# Patient Record
Sex: MALE | Race: BLACK OR AFRICAN AMERICAN | Employment: FULL TIME | URBAN - METROPOLITAN AREA
[De-identification: names, ages, dates, MRNs, and addresses within clinical notes are randomized per-mention and may not be internally consistent; named-entity substitution may affect disease eponyms.]

---

## 2020-02-04 ENCOUNTER — APPOINTMENT (OUTPATIENT)
Dept: CT IMAGING | Age: 50
End: 2020-02-04
Payer: COMMERCIAL

## 2020-02-04 ENCOUNTER — HOSPITAL ENCOUNTER (EMERGENCY)
Age: 50
Discharge: LEFT BEFORE DISCHARGE | End: 2020-02-04
Attending: EMERGENCY MEDICINE
Payer: COMMERCIAL

## 2020-02-04 VITALS
TEMPERATURE: 98.1 F | HEIGHT: 67 IN | RESPIRATION RATE: 16 BRPM | SYSTOLIC BLOOD PRESSURE: 190 MMHG | DIASTOLIC BLOOD PRESSURE: 100 MMHG | WEIGHT: 165 LBS | HEART RATE: 64 BPM | OXYGEN SATURATION: 100 % | BODY MASS INDEX: 25.9 KG/M2

## 2020-02-04 LAB
ALBUMIN SERPL-MCNC: 4.2 G/DL (ref 3.5–5.2)
ALP BLD-CCNC: 70 U/L (ref 40–129)
ALT SERPL-CCNC: 10 U/L (ref 0–40)
ANION GAP SERPL CALCULATED.3IONS-SCNC: 9 MMOL/L (ref 7–16)
AST SERPL-CCNC: 21 U/L (ref 0–39)
BASOPHILS ABSOLUTE: 0.13 E9/L (ref 0–0.2)
BASOPHILS RELATIVE PERCENT: 1.6 % (ref 0–2)
BILIRUB SERPL-MCNC: 0.3 MG/DL (ref 0–1.2)
BUN BLDV-MCNC: 14 MG/DL (ref 6–20)
CALCIUM SERPL-MCNC: 8.9 MG/DL (ref 8.6–10.2)
CHLORIDE BLD-SCNC: 106 MMOL/L (ref 98–107)
CO2: 25 MMOL/L (ref 22–29)
CREAT SERPL-MCNC: 0.8 MG/DL (ref 0.7–1.2)
EOSINOPHILS ABSOLUTE: 0.81 E9/L (ref 0.05–0.5)
EOSINOPHILS RELATIVE PERCENT: 10.1 % (ref 0–6)
GFR AFRICAN AMERICAN: >60
GFR NON-AFRICAN AMERICAN: >60 ML/MIN/1.73
GLUCOSE BLD-MCNC: 98 MG/DL (ref 74–99)
HCT VFR BLD CALC: 44.1 % (ref 37–54)
HEMOGLOBIN: 14.3 G/DL (ref 12.5–16.5)
IMMATURE GRANULOCYTES #: 0.01 E9/L
IMMATURE GRANULOCYTES %: 0.1 % (ref 0–5)
LACTIC ACID: 1.2 MMOL/L (ref 0.5–2.2)
LYMPHOCYTES ABSOLUTE: 2.12 E9/L (ref 1.5–4)
LYMPHOCYTES RELATIVE PERCENT: 26.5 % (ref 20–42)
MCH RBC QN AUTO: 28 PG (ref 26–35)
MCHC RBC AUTO-ENTMCNC: 32.4 % (ref 32–34.5)
MCV RBC AUTO: 86.5 FL (ref 80–99.9)
MONOCYTES ABSOLUTE: 0.64 E9/L (ref 0.1–0.95)
MONOCYTES RELATIVE PERCENT: 8 % (ref 2–12)
NEUTROPHILS ABSOLUTE: 4.28 E9/L (ref 1.8–7.3)
NEUTROPHILS RELATIVE PERCENT: 53.7 % (ref 43–80)
PDW BLD-RTO: 15 FL (ref 11.5–15)
PLATELET # BLD: 185 E9/L (ref 130–450)
PMV BLD AUTO: 11.8 FL (ref 7–12)
POTASSIUM SERPL-SCNC: 5 MMOL/L (ref 3.5–5)
RBC # BLD: 5.1 E12/L (ref 3.8–5.8)
SODIUM BLD-SCNC: 140 MMOL/L (ref 132–146)
TOTAL PROTEIN: 7 G/DL (ref 6.4–8.3)
WBC # BLD: 8 E9/L (ref 4.5–11.5)

## 2020-02-04 PROCEDURE — 85025 COMPLETE CBC W/AUTO DIFF WBC: CPT

## 2020-02-04 PROCEDURE — 70491 CT SOFT TISSUE NECK W/DYE: CPT

## 2020-02-04 PROCEDURE — 70450 CT HEAD/BRAIN W/O DYE: CPT

## 2020-02-04 PROCEDURE — 99284 EMERGENCY DEPT VISIT MOD MDM: CPT

## 2020-02-04 PROCEDURE — 6370000000 HC RX 637 (ALT 250 FOR IP): Performed by: EMERGENCY MEDICINE

## 2020-02-04 PROCEDURE — 83605 ASSAY OF LACTIC ACID: CPT

## 2020-02-04 PROCEDURE — 2580000003 HC RX 258: Performed by: EMERGENCY MEDICINE

## 2020-02-04 PROCEDURE — 6360000002 HC RX W HCPCS: Performed by: EMERGENCY MEDICINE

## 2020-02-04 PROCEDURE — 6360000004 HC RX CONTRAST MEDICATION: Performed by: RADIOLOGY

## 2020-02-04 PROCEDURE — 80053 COMPREHEN METABOLIC PANEL: CPT

## 2020-02-04 PROCEDURE — 96374 THER/PROPH/DIAG INJ IV PUSH: CPT

## 2020-02-04 PROCEDURE — 36415 COLL VENOUS BLD VENIPUNCTURE: CPT

## 2020-02-04 RX ORDER — HYDRALAZINE HYDROCHLORIDE 20 MG/ML
10 INJECTION INTRAMUSCULAR; INTRAVENOUS ONCE
Status: COMPLETED | OUTPATIENT
Start: 2020-02-04 | End: 2020-02-04

## 2020-02-04 RX ORDER — IBUPROFEN 800 MG/1
800 TABLET ORAL EVERY 8 HOURS PRN
Qty: 12 TABLET | Refills: 0 | Status: SHIPPED | OUTPATIENT
Start: 2020-02-04

## 2020-02-04 RX ORDER — AMLODIPINE BESYLATE 5 MG/1
10 TABLET ORAL DAILY
Qty: 30 TABLET | Refills: 3 | Status: SHIPPED | OUTPATIENT
Start: 2020-02-04

## 2020-02-04 RX ORDER — AMOXICILLIN AND CLAVULANATE POTASSIUM 875; 125 MG/1; MG/1
1 TABLET, FILM COATED ORAL 2 TIMES DAILY
Qty: 20 TABLET | Refills: 0 | Status: SHIPPED | OUTPATIENT
Start: 2020-02-04 | End: 2020-02-14

## 2020-02-04 RX ORDER — IBUPROFEN 800 MG/1
800 TABLET ORAL ONCE
Status: COMPLETED | OUTPATIENT
Start: 2020-02-04 | End: 2020-02-04

## 2020-02-04 RX ORDER — AMLODIPINE BESYLATE 5 MG/1
10 TABLET ORAL DAILY
Status: DISCONTINUED | OUTPATIENT
Start: 2020-02-04 | End: 2020-02-04 | Stop reason: HOSPADM

## 2020-02-04 RX ORDER — 0.9 % SODIUM CHLORIDE 0.9 %
1000 INTRAVENOUS SOLUTION INTRAVENOUS ONCE
Status: COMPLETED | OUTPATIENT
Start: 2020-02-04 | End: 2020-02-04

## 2020-02-04 RX ADMIN — SODIUM CHLORIDE 1000 ML: 9 INJECTION, SOLUTION INTRAVENOUS at 11:25

## 2020-02-04 RX ADMIN — HYDRALAZINE HYDROCHLORIDE 10 MG: 20 INJECTION INTRAMUSCULAR; INTRAVENOUS at 13:23

## 2020-02-04 RX ADMIN — IOPAMIDOL 90 ML: 755 INJECTION, SOLUTION INTRAVENOUS at 12:12

## 2020-02-04 RX ADMIN — IBUPROFEN 800 MG: 800 TABLET, FILM COATED ORAL at 14:07

## 2020-02-04 RX ADMIN — AMLODIPINE BESYLATE 10 MG: 5 TABLET ORAL at 14:28

## 2020-02-04 ASSESSMENT — PAIN SCALES - GENERAL
PAINLEVEL_OUTOF10: 6
PAINLEVEL_OUTOF10: 6
PAINLEVEL_OUTOF10: 10

## 2020-02-04 ASSESSMENT — PAIN DESCRIPTION - ORIENTATION: ORIENTATION: LEFT

## 2020-02-04 ASSESSMENT — PAIN DESCRIPTION - DESCRIPTORS: DESCRIPTORS: STABBING;THROBBING

## 2020-02-04 ASSESSMENT — PAIN DESCRIPTION - PAIN TYPE: TYPE: ACUTE PAIN

## 2020-02-04 ASSESSMENT — PAIN DESCRIPTION - LOCATION: LOCATION: THROAT

## 2020-02-04 NOTE — ED PROVIDER NOTES
Cardiovascular:  Normal rate, normal rhythm, no murmurs, no gallops, no rubs. Radial pulses 2+ bilaterally. GI:  Soft, nondistended, normal bowel sounds, nontender, no organomegaly, no mass, no rebound, no guarding   :  No costovertebral angle tenderness   Musculoskeletal:  No edema, no tenderness, no deformities. Back- no tenderness  Integument:  Well hydrated, no rash. Adequate perfusion. Lymphatic: 2 discrete mobile posterior lymph nodes noted 1 behind right ear 1 right occipital lymphadenopathy noted. The occipital node is tender the posterior auricular node is nontender. Neurologic:  Alert & oriented x 3, CN 2-12 normal, normal gait, no focal deficits noted. Psychiatric:  Speech and behavior appropriate       -------------------------------------------------- RESULTS -------------------------------------------------  I have personally reviewed all laboratory and imaging results for this patient. Results are listed below.      LABS:  Results for orders placed or performed during the hospital encounter of 02/04/20   CBC auto differential   Result Value Ref Range    WBC 8.0 4.5 - 11.5 E9/L    RBC 5.10 3.80 - 5.80 E12/L    Hemoglobin 14.3 12.5 - 16.5 g/dL    Hematocrit 44.1 37.0 - 54.0 %    MCV 86.5 80.0 - 99.9 fL    MCH 28.0 26.0 - 35.0 pg    MCHC 32.4 32.0 - 34.5 %    RDW 15.0 11.5 - 15.0 fL    Platelets 429 142 - 072 E9/L    MPV 11.8 7.0 - 12.0 fL    Neutrophils % 53.7 43.0 - 80.0 %    Immature Granulocytes % 0.1 0.0 - 5.0 %    Lymphocytes % 26.5 20.0 - 42.0 %    Monocytes % 8.0 2.0 - 12.0 %    Eosinophils % 10.1 (H) 0.0 - 6.0 %    Basophils % 1.6 0.0 - 2.0 %    Neutrophils Absolute 4.28 1.80 - 7.30 E9/L    Immature Granulocytes # 0.01 E9/L    Lymphocytes Absolute 2.12 1.50 - 4.00 E9/L    Monocytes Absolute 0.64 0.10 - 0.95 E9/L    Eosinophils Absolute 0.81 (H) 0.05 - 0.50 E9/L    Basophils Absolute 0.13 0.00 - 0.20 E9/L   Comprehensive Metabolic Panel   Result Value Ref Range    Sodium 140 132 - chloride bolus (0 mLs Intravenous Stopped 2/4/20 1258)   iopamidol (ISOVUE-370) 76 % injection 90 mL (90 mLs Intravenous Given 2/4/20 1212)   hydrALAZINE (APRESOLINE) injection 10 mg (10 mg Intravenous Given 2/4/20 1323)   ibuprofen (ADVIL;MOTRIN) tablet 800 mg (800 mg Oral Given 2/4/20 1407)           Procedures:  none      Medical Decision Making:    BP up, used to take Norvasc 10mg but hasn't been on for 2 years. HR low (in 60s) but no improvement with Hydralazine. Given Norvasc here and Rx and he doesn't want to wait for number to come down, states \"I feel fine\". Will f/u with PCP. Rx for Augmentin and Motrin for adenopathy of cervical area and thickening of orophanx and upper esophagus. Need ENT referral by PCP. This patient's ED course included: a personal history and physicial examination, IV medications, cardiac monitoring, continuous pulse oximetry and a personal history and physicial eaxmination    This patient has remained hemodynamically stable, remained unchanged and been closely monitored during their ED course. Re-Evaluations:             Time: 2:34 PM  Re-evaluation. Patients symptoms show no change  Repeat physical examination is not changed        Consultations:             none    Critical Care: none        Counseling: The emergency provider has spoken with the patient and spouse/SO and discussed todays results, in addition to providing specific details for the plan of care and counseling regarding the diagnosis and prognosis. Questions are answered at this time and they are agreeable with the plan. This patient has chosen to leave against medical advice. I have personally explained to them that choosing to do so may result in permanent bodily harm or death. I discussed at length that without further evaluation and monitoring there may be unforeseen circumstances and deterioration resulting in permanent bodily harm or death as a result of their choice.     They are alert, oriented, and competent at this time. They state that they are aware of the serious risks as explained, but they continue to wish to leave against medical advice. In light of their decision to leave against medical advice, follow-up has been arranged and they are aware of the importance of following up as instructed. They have been advised that they should return to the ED immediately if they change their mind at any time, or if their condition begins to change or worsen. --------------------------------- IMPRESSION AND DISPOSITION ---------------------------------    IMPRESSION  1. Cervical lymphadenopathy    2.  Essential hypertension        DISPOSITION  Disposition: Other Disposition: Left AMA  Patient condition is stable                 Hodan Sharp DO  02/04/20 1283

## 2023-08-14 ENCOUNTER — TELEPHONE (OUTPATIENT)
Dept: ADMINISTRATIVE | Age: 53
End: 2023-08-14

## 2023-08-14 NOTE — TELEPHONE ENCOUNTER
Ene Bear with [facility] office calling for URGENT NP apt with Cardiology. Patient Appointment Form:      PCP: Dr. Antwon Brown  Referring: Dr. Antwon Brown    Has the Patient:    Seen a Cardiologist? no    Had a heart catheterization? no    Had heart surgery? no    Had a stress test or nuclear stress test? Chest CT - faxing from [facility]    Had an echocardiogram? no    Had a vascular ultrasound? no    Had a 24/48 heart monitor or extended cardiac event monitor? no    Had recent blood work in the last 6 months?  Yes PCP faxing    Had a pacemaker/ICD/ILR implant? no    Seen an Electrophysiologist? no        Will send records via: PCP faxing referral and recs and CT chest to [facility]       Date & time of appointment:  8/15/23 @ 11:00 with \"Dr. Mcnulty\" - ok'd by office

## 2023-08-15 ENCOUNTER — OFFICE VISIT (OUTPATIENT)
Dept: CARDIOLOGY CLINIC | Age: 53
End: 2023-08-15
Payer: COMMERCIAL

## 2023-08-15 VITALS
RESPIRATION RATE: 18 BRPM | WEIGHT: 160.1 LBS | BODY MASS INDEX: 25.13 KG/M2 | SYSTOLIC BLOOD PRESSURE: 192 MMHG | DIASTOLIC BLOOD PRESSURE: 94 MMHG | HEART RATE: 62 BPM | HEIGHT: 67 IN

## 2023-08-15 DIAGNOSIS — M16.0 PRIMARY OSTEOARTHRITIS OF BOTH HIPS: ICD-10-CM

## 2023-08-15 DIAGNOSIS — Z72.0 TOBACCO ABUSE: ICD-10-CM

## 2023-08-15 DIAGNOSIS — R06.09 DOE (DYSPNEA ON EXERTION): Primary | ICD-10-CM

## 2023-08-15 DIAGNOSIS — M15.9 OSTEOARTHRITIS OF MULTIPLE JOINTS, UNSPECIFIED OSTEOARTHRITIS TYPE: ICD-10-CM

## 2023-08-15 DIAGNOSIS — R01.1 HEART MURMUR: ICD-10-CM

## 2023-08-15 DIAGNOSIS — I10 ESSENTIAL HYPERTENSION: ICD-10-CM

## 2023-08-15 PROCEDURE — 93000 ELECTROCARDIOGRAM COMPLETE: CPT | Performed by: INTERNAL MEDICINE

## 2023-08-15 PROCEDURE — 3077F SYST BP >= 140 MM HG: CPT | Performed by: INTERNAL MEDICINE

## 2023-08-15 PROCEDURE — 3080F DIAST BP >= 90 MM HG: CPT | Performed by: INTERNAL MEDICINE

## 2023-08-15 PROCEDURE — 99204 OFFICE O/P NEW MOD 45 MIN: CPT | Performed by: INTERNAL MEDICINE

## 2023-08-15 NOTE — PROGRESS NOTES
Out PATIENT New CARDIOLOGY CONSULT    Name: Candida Rodriguez    Age: 47 y.o. Date of Admission: No admission date for patient encounter. Date of Service: 8/15/2023    Reason for Consultation:   Chief Complaint   Patient presents with    New Patient     Enlarged heart, left atrial enlargement          Referring Physician: No admitting provider for patient encounter. History of Present Illness: 65-year-old male with history of hypertension, degenerative joint disease and tobacco abuse who referred by his primary care physician due to heart murmur. The patient reports generally he is very active working as a  lifting heavy objects and walking long distance without any limitation. He reported occasional/once in a blooming blue shortness of breath but he denies chest pain, fatigue, dizziness, palpitations, orthopnea or paroxysmal nocturnal dyspnea lower extremity edema. On physical examination he does have systolic heart murmur and echocardiogram was arranged. He will monitor for his symptoms closely and if you develop worsening dyspnea on exertion or if you develop fatigue or chest discomfort then he will call our office to arrange for possible ask medical evaluation with stress test.        Past Medical History:  No past medical history on file. Past Surgical History:  No past surgical history on file. Family History:  No family history on file.     Social History:  Social History     Socioeconomic History    Marital status:      Spouse name: Not on file    Number of children: Not on file    Years of education: Not on file    Highest education level: Not on file   Occupational History    Not on file   Tobacco Use    Smoking status: Every Day     Types: Cigars    Smokeless tobacco: Current     Types: Chew    Tobacco comments:     4-5 cigars/day   Vaping Use    Vaping Use: Never used   Substance and Sexual Activity    Alcohol use: Yes     Comment: occasionally

## 2023-09-28 ENCOUNTER — HOSPITAL ENCOUNTER (OUTPATIENT)
Dept: CARDIOLOGY | Age: 53
Discharge: HOME | End: 2023-09-28
Payer: COMMERCIAL

## 2023-09-28 DIAGNOSIS — M15.9 OSTEOARTHRITIS OF MULTIPLE JOINTS, UNSPECIFIED OSTEOARTHRITIS TYPE: ICD-10-CM

## 2023-09-28 DIAGNOSIS — I10 ESSENTIAL HYPERTENSION: ICD-10-CM

## 2023-09-28 DIAGNOSIS — R01.1 HEART MURMUR: ICD-10-CM

## 2023-09-28 DIAGNOSIS — Z72.0 TOBACCO ABUSE: ICD-10-CM

## 2023-09-28 DIAGNOSIS — R06.09 DOE (DYSPNEA ON EXERTION): ICD-10-CM

## 2023-09-28 DIAGNOSIS — M16.0 PRIMARY OSTEOARTHRITIS OF BOTH HIPS: ICD-10-CM

## 2023-09-28 PROCEDURE — 93306 TTE W/DOPPLER COMPLETE: CPT | Performed by: PSYCHIATRY & NEUROLOGY

## 2023-10-24 ENCOUNTER — TELEPHONE (OUTPATIENT)
Dept: CARDIOLOGY CLINIC | Age: 53
End: 2023-10-24

## 2023-10-26 NOTE — TELEPHONE ENCOUNTER
Patient notified of Dr. Sammye Kocher results and recommendations. Follow up scheduled for 10/31/23.  Patient verbalized understanding

## 2023-10-31 ENCOUNTER — OFFICE VISIT (OUTPATIENT)
Dept: CARDIOLOGY CLINIC | Age: 53
End: 2023-10-31
Payer: COMMERCIAL

## 2023-10-31 VITALS
HEART RATE: 64 BPM | RESPIRATION RATE: 12 BRPM | SYSTOLIC BLOOD PRESSURE: 190 MMHG | HEIGHT: 67 IN | WEIGHT: 158 LBS | BODY MASS INDEX: 24.8 KG/M2 | DIASTOLIC BLOOD PRESSURE: 132 MMHG

## 2023-10-31 DIAGNOSIS — I07.1 MODERATE TRICUSPID VALVE REGURGITATION: ICD-10-CM

## 2023-10-31 DIAGNOSIS — R94.31 ABNORMAL EKG: ICD-10-CM

## 2023-10-31 DIAGNOSIS — R06.09 DOE (DYSPNEA ON EXERTION): Primary | ICD-10-CM

## 2023-10-31 DIAGNOSIS — Z72.0 TOBACCO ABUSE: ICD-10-CM

## 2023-10-31 DIAGNOSIS — I38 VALVULAR HEART DISEASE: ICD-10-CM

## 2023-10-31 DIAGNOSIS — I34.0 SEVERE MITRAL VALVE REGURGITATION: ICD-10-CM

## 2023-10-31 DIAGNOSIS — I51.7 LVH (LEFT VENTRICULAR HYPERTROPHY): ICD-10-CM

## 2023-10-31 DIAGNOSIS — I10 ESSENTIAL HYPERTENSION: ICD-10-CM

## 2023-10-31 PROCEDURE — 93000 ELECTROCARDIOGRAM COMPLETE: CPT | Performed by: INTERNAL MEDICINE

## 2023-10-31 PROCEDURE — 3078F DIAST BP <80 MM HG: CPT | Performed by: INTERNAL MEDICINE

## 2023-10-31 PROCEDURE — 99202 OFFICE O/P NEW SF 15 MIN: CPT | Performed by: INTERNAL MEDICINE

## 2023-10-31 PROCEDURE — 3074F SYST BP LT 130 MM HG: CPT | Performed by: INTERNAL MEDICINE

## 2023-10-31 RX ORDER — LISINOPRIL 20 MG/1
20 TABLET ORAL DAILY
COMMUNITY
Start: 2023-10-09

## 2023-10-31 RX ORDER — CARVEDILOL 12.5 MG/1
12.5 TABLET ORAL 2 TIMES DAILY WITH MEALS
Qty: 180 TABLET | Refills: 3 | Status: SHIPPED | OUTPATIENT
Start: 2023-10-31

## 2023-10-31 NOTE — PROGRESS NOTES
Out PATIENT New CARDIOLOGY CONSULT    Name: Paul Gonzalez    Age: 46 y.o. Date of Admission: No admission date for patient encounter. Date of Service: 10/31/2023    Reason for Consultation:   Chief Complaint   Patient presents with    Shortness of Breath    Swelling          Referring Physician: No admitting provider for patient encounter. History of Present Illness: 50-year-old male with history of hypertension, degenerative joint disease and tobacco abuse who for follow-up visit for follow-up visit. Recent echocardiogram revealed: Moderate left ventricular concentric hypertrophy, Severe mitral regurgitation and Moderate tricuspid regurgitation. His blood pressure is not controlled and have added carvedilol for optimal blood pressure control. He supposed to be on both lisinopril and amlodipine but apparently taking only lisinopril but not amlodipine. He will  monitor blood pressure and heart rate at home and bring to nest visit to guide therapy. He report worsening dyspnea exertion and subsequently exercise myocardial perfusion stress test is arranged. He denies orthopnea or paroxysmal dyspnea. After stress test is done if patient has abnormal stress test we will arrange for CAMILLE and cardiac catheterization. If stress test is normal then we will arrange for CAMILLE alone. Echocardiogram reviewed: TTE 8/2023   Summary   Estimated left ventricle ejection fraction 55 to 60 %. Moderate left ventricular concentric hypertrophy noted. The left atrium is severely dilated. Normal right ventricular size and function. Moderate dilated right atrium. Severe mitral regurgitation is present. Moderate tricuspid regurgitation. RVSP is 45 mmHg. Mild pulmonic regurgitation present.     Past Medical History:  Past Medical History:   Diagnosis Date    Hypertension        Past Surgical History:  Past Surgical History:   Procedure Laterality Date    KNEE SURGERY      NOSE SURGERY         Family

## 2024-01-26 ENCOUNTER — TELEPHONE (OUTPATIENT)
Dept: CARDIOLOGY | Age: 54
End: 2024-01-26

## 2024-01-26 NOTE — TELEPHONE ENCOUNTER
Patient called to schedule stress test.  No order found.  Note says stress test every _____?  Does the patient need this done?    If so, order needs placed.      Electronically signed by Mary Pickens on 1/26/2024 at 10:34 AM

## 2024-01-31 ENCOUNTER — TELEPHONE (OUTPATIENT)
Dept: CARDIOLOGY | Age: 54
End: 2024-01-31

## 2024-01-31 DIAGNOSIS — R06.09 DOE (DYSPNEA ON EXERTION): Primary | ICD-10-CM

## 2024-01-31 DIAGNOSIS — R94.31 ABNORMAL EKG: ICD-10-CM

## 2024-01-31 NOTE — TELEPHONE ENCOUNTER
CALLED PATIENT AND LEFT MESSAGE TO SCHEDULE STRESS TEST    Electronically signed by Lynda Mendoza on 1/31/2024 at 12:05 PM

## 2024-02-07 ENCOUNTER — TELEPHONE (OUTPATIENT)
Dept: CARDIOLOGY | Age: 54
End: 2024-02-07

## 2024-02-07 NOTE — TELEPHONE ENCOUNTER
Left message on voice mail to remind patient of nuclear stress test appointment on 2/9/24 at 0930. Instructions for test left on voice mail. Asked patient to call with any questions or if unable to keep appointment.

## 2024-02-09 ENCOUNTER — TELEPHONE (OUTPATIENT)
Dept: CARDIOLOGY | Age: 54
End: 2024-02-09

## 2024-02-27 ENCOUNTER — TELEPHONE (OUTPATIENT)
Dept: CARDIOLOGY | Age: 54
End: 2024-02-27

## 2024-02-27 NOTE — TELEPHONE ENCOUNTER
Left message on voice mail to remind patient of nuclear exercise stress test appointment on February 29, 2024 at 0900. Instructions for test, and COVID-19 preprocedure information left on voice mail. Asked patient to call with any questions or if unable to keep appointment.

## 2024-02-29 ENCOUNTER — HOSPITAL ENCOUNTER (OUTPATIENT)
Dept: CARDIOLOGY | Age: 54
Discharge: HOME | End: 2024-03-02
Attending: INTERNAL MEDICINE
Payer: COMMERCIAL

## 2024-02-29 VITALS
HEART RATE: 50 BPM | SYSTOLIC BLOOD PRESSURE: 150 MMHG | DIASTOLIC BLOOD PRESSURE: 92 MMHG | HEIGHT: 67 IN | BODY MASS INDEX: 22.76 KG/M2 | WEIGHT: 145 LBS | RESPIRATION RATE: 16 BRPM

## 2024-02-29 DIAGNOSIS — R94.31 ABNORMAL EKG: Primary | ICD-10-CM

## 2024-02-29 DIAGNOSIS — R06.09 DOE (DYSPNEA ON EXERTION): ICD-10-CM

## 2024-02-29 LAB
ECHO BSA: 1.76 M2
NUC STRESS EJECTION FRACTION: 60 %
STRESS BASELINE DIAS BP: 92 MMHG
STRESS BASELINE HR: 45 BPM
STRESS BASELINE SYS BP: 150 MMHG
STRESS ESTIMATED WORKLOAD: 1 METS
STRESS PEAK DIAS BP: 90 MMHG
STRESS PEAK SYS BP: 182 MMHG
STRESS PERCENT HR ACHIEVED: 56 %
STRESS POST PEAK HR: 93 BPM
STRESS RATE PRESSURE PRODUCT: NORMAL BPM*MMHG
STRESS TARGET HR: 166 BPM

## 2024-02-29 PROCEDURE — 6360000002 HC RX W HCPCS: Performed by: INTERNAL MEDICINE

## 2024-02-29 PROCEDURE — 2580000003 HC RX 258: Performed by: INTERNAL MEDICINE

## 2024-02-29 PROCEDURE — 78452 HT MUSCLE IMAGE SPECT MULT: CPT

## 2024-02-29 PROCEDURE — 3430000000 HC RX DIAGNOSTIC RADIOPHARMACEUTICAL: Performed by: INTERNAL MEDICINE

## 2024-02-29 PROCEDURE — A9500 TC99M SESTAMIBI: HCPCS | Performed by: INTERNAL MEDICINE

## 2024-02-29 RX ORDER — REGADENOSON 0.08 MG/ML
0.4 INJECTION, SOLUTION INTRAVENOUS
Status: COMPLETED | OUTPATIENT
Start: 2024-02-29 | End: 2024-02-29

## 2024-02-29 RX ORDER — TETRAKIS(2-METHOXYISOBUTYLISOCYANIDE)COPPER(I) TETRAFLUOROBORATE 1 MG/ML
34.2 INJECTION, POWDER, LYOPHILIZED, FOR SOLUTION INTRAVENOUS
Status: COMPLETED | OUTPATIENT
Start: 2024-02-29 | End: 2024-02-29

## 2024-02-29 RX ORDER — SODIUM CHLORIDE 0.9 % (FLUSH) 0.9 %
10 SYRINGE (ML) INJECTION PRN
Status: DISCONTINUED | OUTPATIENT
Start: 2024-02-29 | End: 2024-03-03 | Stop reason: HOSPADM

## 2024-02-29 RX ORDER — TETRAKIS(2-METHOXYISOBUTYLISOCYANIDE)COPPER(I) TETRAFLUOROBORATE 1 MG/ML
11 INJECTION, POWDER, LYOPHILIZED, FOR SOLUTION INTRAVENOUS
Status: COMPLETED | OUTPATIENT
Start: 2024-02-29 | End: 2024-02-29

## 2024-02-29 RX ADMIN — Medication 11 MILLICURIE: at 08:53

## 2024-02-29 RX ADMIN — SODIUM CHLORIDE, PRESERVATIVE FREE 10 ML: 5 INJECTION INTRAVENOUS at 08:53

## 2024-02-29 RX ADMIN — SODIUM CHLORIDE, PRESERVATIVE FREE 10 ML: 5 INJECTION INTRAVENOUS at 10:51

## 2024-02-29 RX ADMIN — SODIUM CHLORIDE, PRESERVATIVE FREE 10 ML: 5 INJECTION INTRAVENOUS at 10:52

## 2024-02-29 RX ADMIN — Medication 34.2 MILLICURIE: at 10:52

## 2024-02-29 RX ADMIN — REGADENOSON 0.4 MG: 0.08 INJECTION, SOLUTION INTRAVENOUS at 10:51

## 2024-03-01 ENCOUNTER — TELEPHONE (OUTPATIENT)
Dept: CARDIOLOGY CLINIC | Age: 54
End: 2024-03-01

## 2024-03-01 NOTE — TELEPHONE ENCOUNTER
----- Message from Palmer Mcnulty MD sent at 2/29/2024  2:16 PM EST -----  Stress test is abnormal.  Please add this patient to my list tomorrow to discuss further plans if this is okay with patient.

## 2024-03-01 NOTE — TELEPHONE ENCOUNTER
Patient notified of Stress results patient unable to come today. Patient does have an appointment for 3/14/24 to follow up.

## 2024-03-02 ENCOUNTER — HOSPITAL ENCOUNTER (OUTPATIENT)
Dept: ULTRASOUND IMAGING | Age: 54
End: 2024-03-02
Attending: INTERNAL MEDICINE
Payer: COMMERCIAL

## 2024-03-02 ENCOUNTER — HOSPITAL ENCOUNTER (OUTPATIENT)
Age: 54
Discharge: HOME | End: 2024-03-02
Attending: INTERNAL MEDICINE
Payer: COMMERCIAL

## 2024-03-02 DIAGNOSIS — I10 ESSENTIAL HYPERTENSION, MALIGNANT: ICD-10-CM

## 2024-03-02 LAB
25(OH)D3 SERPL-MCNC: 16.6 NG/ML (ref 30–100)
ALBUMIN SERPL-MCNC: 3.7 G/DL (ref 3.5–5.2)
ALP SERPL-CCNC: 181 U/L (ref 40–129)
ALT SERPL-CCNC: 22 U/L (ref 0–40)
ANION GAP SERPL CALCULATED.3IONS-SCNC: 12 MMOL/L (ref 7–16)
AST SERPL-CCNC: 20 U/L (ref 0–39)
BILIRUB SERPL-MCNC: 0.4 MG/DL (ref 0–1.2)
BUN SERPL-MCNC: 17 MG/DL (ref 6–20)
CALCIUM SERPL-MCNC: 9.3 MG/DL (ref 8.6–10.2)
CHLORIDE SERPL-SCNC: 100 MMOL/L (ref 98–107)
CO2 SERPL-SCNC: 25 MMOL/L (ref 22–29)
CREAT SERPL-MCNC: 0.9 MG/DL (ref 0.7–1.2)
ERYTHROCYTE [DISTWIDTH] IN BLOOD BY AUTOMATED COUNT: 14.8 % (ref 11.5–15)
GFR SERPL CREATININE-BSD FRML MDRD: >60 ML/MIN/1.73M2
GLUCOSE SERPL-MCNC: 111 MG/DL (ref 74–99)
HCT VFR BLD AUTO: 46.6 % (ref 37–54)
HGB BLD-MCNC: 15.7 G/DL (ref 12.5–16.5)
MCH RBC QN AUTO: 28.8 PG (ref 26–35)
MCHC RBC AUTO-ENTMCNC: 33.7 G/DL (ref 32–34.5)
MCV RBC AUTO: 85.3 FL (ref 80–99.9)
PLATELET # BLD AUTO: 231 K/UL (ref 130–450)
PMV BLD AUTO: 10.2 FL (ref 7–12)
POTASSIUM SERPL-SCNC: 4.1 MMOL/L (ref 3.5–5)
PROT SERPL-MCNC: 7.5 G/DL (ref 6.4–8.3)
RBC # BLD AUTO: 5.46 M/UL (ref 3.8–5.8)
SODIUM SERPL-SCNC: 137 MMOL/L (ref 132–146)
WBC OTHER # BLD: 7.9 K/UL (ref 4.5–11.5)

## 2024-03-02 PROCEDURE — 85027 COMPLETE CBC AUTOMATED: CPT

## 2024-03-02 PROCEDURE — 93975 VASCULAR STUDY: CPT

## 2024-03-02 PROCEDURE — 82088 ASSAY OF ALDOSTERONE: CPT

## 2024-03-02 PROCEDURE — 84244 ASSAY OF RENIN: CPT

## 2024-03-02 PROCEDURE — 36415 COLL VENOUS BLD VENIPUNCTURE: CPT

## 2024-03-02 PROCEDURE — 82306 VITAMIN D 25 HYDROXY: CPT

## 2024-03-02 PROCEDURE — 80053 COMPREHEN METABOLIC PANEL: CPT

## 2024-03-02 PROCEDURE — 76770 US EXAM ABDO BACK WALL COMP: CPT

## 2024-03-06 LAB
ALDOST SERPL-MCNC: 7.5 NG/DL
ALDOSTERONE COMMENT: NORMAL
RENIN COMMENT: NORMAL
RENIN PLAS-CCNC: 1.7 NG/ML/HR

## 2024-03-14 ENCOUNTER — OFFICE VISIT (OUTPATIENT)
Dept: CARDIOLOGY CLINIC | Age: 54
End: 2024-03-14

## 2024-03-14 ENCOUNTER — TELEPHONE (OUTPATIENT)
Dept: CARDIOLOGY CLINIC | Age: 54
End: 2024-03-14

## 2024-03-14 VITALS
RESPIRATION RATE: 18 BRPM | BODY MASS INDEX: 22.73 KG/M2 | WEIGHT: 150 LBS | HEART RATE: 42 BPM | SYSTOLIC BLOOD PRESSURE: 120 MMHG | DIASTOLIC BLOOD PRESSURE: 80 MMHG | HEIGHT: 68 IN

## 2024-03-14 DIAGNOSIS — R94.39 ABNORMAL STRESS ECG: ICD-10-CM

## 2024-03-14 DIAGNOSIS — I07.1 MODERATE TRICUSPID VALVE REGURGITATION: ICD-10-CM

## 2024-03-14 DIAGNOSIS — I34.0 SEVERE MITRAL VALVE REGURGITATION: ICD-10-CM

## 2024-03-14 DIAGNOSIS — R94.39 ABNORMAL STRESS TEST: ICD-10-CM

## 2024-03-14 DIAGNOSIS — R06.09 DOE (DYSPNEA ON EXERTION): Primary | ICD-10-CM

## 2024-03-14 DIAGNOSIS — Z72.0 TOBACCO ABUSE: ICD-10-CM

## 2024-03-14 DIAGNOSIS — R94.31 ABNORMAL EKG: ICD-10-CM

## 2024-03-14 DIAGNOSIS — I51.7 LVH (LEFT VENTRICULAR HYPERTROPHY): ICD-10-CM

## 2024-03-14 DIAGNOSIS — I10 ESSENTIAL HYPERTENSION: ICD-10-CM

## 2024-03-14 RX ORDER — CARVEDILOL 25 MG/1
25 TABLET ORAL 2 TIMES DAILY WITH MEALS
COMMUNITY
End: 2024-03-16 | Stop reason: ALTCHOICE

## 2024-03-14 RX ORDER — CARVEDILOL 12.5 MG/1
12.5 TABLET ORAL 2 TIMES DAILY WITH MEALS
COMMUNITY
End: 2024-03-16 | Stop reason: SDUPTHER

## 2024-03-14 RX ORDER — HYDROCHLOROTHIAZIDE 25 MG/1
25 TABLET ORAL DAILY
COMMUNITY

## 2024-03-14 RX ORDER — CARVEDILOL 25 MG/1
25 TABLET ORAL 2 TIMES DAILY WITH MEALS
COMMUNITY
End: 2024-03-14

## 2024-03-14 NOTE — TELEPHONE ENCOUNTER
3/14/24-Called Jenny.  Auth not required for cath ([id number]) or CAMILLE ([id number]).  Call Ref # [id number].

## 2024-03-14 NOTE — TELEPHONE ENCOUNTER
Patient needs a Left heart cath and a CAMILLE for LARSNO pending prior auth. Thank you     CPT 12435  ICD R06.09, R94.39    [id number]  ICD R06.09, R94.39

## 2024-03-14 NOTE — PROGRESS NOTES
Out PATIENT New CARDIOLOGY CONSULT    Name: Binh Erazo    Age: 53 y.o.    Date of Admission: No admission date for patient encounter.    Date of Service: 3/16/2024    Reason for Consultation:   Chief Complaint   Patient presents with    3 Month Follow-Up    Cardiac Clearance          Referring Physician: No admitting provider for patient encounter.    History of Present Illness: 53-year-old male with history of hypertension, degenerative joint disease and tobacco abuse who presents for follow-up visit.  He has severe mitral regurgitation and moderate fatigability and recent stress test was abnormal.  On today's visit patient reports since November he has been having worsening dyspnea on exertion, fatigue with activities, shortness of breath and low energy level and has had difficulties walking long distance and going up incline due to shortness of breath and fatigue.  CAMILLE and cardiac authorization arranged      Echocardiogram reviewed: TTE 8/2023   Summary   Estimated left ventricle ejection fraction 55 to 60 %.   Moderate left ventricular concentric hypertrophy noted.   The left atrium is severely dilated.   Normal right ventricular size and function.   Moderate dilated right atrium.   Severe mitral regurgitation is present.   Moderate tricuspid regurgitation.   RVSP is 45 mmHg.   Mild pulmonic regurgitation present.    Past Medical History:  Past Medical History:   Diagnosis Date    Hypertension        Past Surgical History:  Past Surgical History:   Procedure Laterality Date    KNEE SURGERY      NOSE SURGERY         Family History:  History reviewed. No pertinent family history.    Social History:  Social History     Socioeconomic History    Marital status:      Spouse name: Not on file    Number of children: Not on file    Years of education: Not on file    Highest education level: Not on file   Occupational History    Not on file   Tobacco Use    Smoking status: Every Day     Types: Cigars

## 2024-03-16 RX ORDER — LISINOPRIL 40 MG/1
40 TABLET ORAL DAILY
Qty: 90 TABLET | Refills: 3 | Status: SHIPPED | OUTPATIENT
Start: 2024-03-16

## 2024-03-16 RX ORDER — CARVEDILOL 3.12 MG/1
3.12 TABLET ORAL 2 TIMES DAILY WITH MEALS
Qty: 60 TABLET | Refills: 2 | Status: SHIPPED | OUTPATIENT
Start: 2024-03-16

## 2024-03-18 ENCOUNTER — TELEPHONE (OUTPATIENT)
Dept: CARDIOLOGY CLINIC | Age: 54
End: 2024-03-18

## 2024-03-18 NOTE — TELEPHONE ENCOUNTER
----- Message from Palmer Mcnulty MD sent at 3/16/2024  8:01 PM EDT -----  Please notify patient because his heart rate is low, I decrease his carvedilol dose to 3.125 mg p.o. twice a day and I have sent this new prescription to his pharmacy.  Also, please help arrange for cardiac cath and CAMILLE for further evaluation with cardiac cath AUC of 9 and 7

## 2024-03-18 NOTE — TELEPHONE ENCOUNTER
Patient notified of the decrease of Coreg to 3.125 mg BID per Dr. Mcnulty recommendations for low heart rate. Patient's chart reflects changes and script was e-scribed Patient scheduled for LHC 4/24/24 10:30 am and CAMILLE on 4/24/24 at 9:30 am and given the intructions for both procedures.

## 2024-03-25 ENCOUNTER — TELEPHONE (OUTPATIENT)
Dept: CARDIOLOGY CLINIC | Age: 54
End: 2024-03-25

## 2024-03-25 NOTE — TELEPHONE ENCOUNTER
Dr. Webster  nephrology [phone] would like to talk to Dr. Mcnulty before patient has heart cath.

## 2024-04-09 RX ORDER — CARVEDILOL 3.12 MG/1
3.12 TABLET ORAL 2 TIMES DAILY WITH MEALS
Qty: 180 TABLET | Refills: 3 | Status: SHIPPED | OUTPATIENT
Start: 2024-04-09

## 2024-04-23 ENCOUNTER — TELEPHONE (OUTPATIENT)
Age: 54
End: 2024-04-23

## 2024-04-23 ENCOUNTER — HOSPITAL ENCOUNTER (OUTPATIENT)
Age: 54
Discharge: HOME | End: 2024-04-23
Payer: COMMERCIAL

## 2024-04-23 DIAGNOSIS — R94.39 ABNORMAL STRESS ECG: ICD-10-CM

## 2024-04-23 DIAGNOSIS — R06.09 DOE (DYSPNEA ON EXERTION): ICD-10-CM

## 2024-04-23 LAB
ABO + RH BLD: NORMAL
ANION GAP SERPL CALCULATED.3IONS-SCNC: 14 MMOL/L (ref 7–16)
ARM BAND NUMBER: NORMAL
BLOOD BANK SAMPLE EXPIRATION: NORMAL
BLOOD GROUP ANTIBODIES SERPL: NEGATIVE
BUN SERPL-MCNC: 22 MG/DL (ref 6–20)
CALCIUM SERPL-MCNC: 9.7 MG/DL (ref 8.6–10.2)
CHLORIDE SERPL-SCNC: 100 MMOL/L (ref 98–107)
CO2 SERPL-SCNC: 26 MMOL/L (ref 22–29)
CREAT SERPL-MCNC: 1 MG/DL (ref 0.7–1.2)
ERYTHROCYTE [DISTWIDTH] IN BLOOD BY AUTOMATED COUNT: 16.3 % (ref 11.5–15)
GFR SERPL CREATININE-BSD FRML MDRD: >90 ML/MIN/1.73M2
GLUCOSE SERPL-MCNC: 92 MG/DL (ref 74–99)
HCT VFR BLD AUTO: 44.5 % (ref 37–54)
HGB BLD-MCNC: 14.6 G/DL (ref 12.5–16.5)
INR PPP: 1
MCH RBC QN AUTO: 28.5 PG (ref 26–35)
MCHC RBC AUTO-ENTMCNC: 32.8 G/DL (ref 32–34.5)
MCV RBC AUTO: 86.7 FL (ref 80–99.9)
PARTIAL THROMBOPLASTIN TIME: 33.2 SEC (ref 24.5–35.1)
PLATELET # BLD AUTO: 226 K/UL (ref 130–450)
PMV BLD AUTO: 11 FL (ref 7–12)
POTASSIUM SERPL-SCNC: 4.2 MMOL/L (ref 3.5–5)
PROTHROMBIN TIME: 10.8 SEC (ref 9.3–12.4)
RBC # BLD AUTO: 5.13 M/UL (ref 3.8–5.8)
SODIUM SERPL-SCNC: 140 MMOL/L (ref 132–146)
WBC OTHER # BLD: 8.2 K/UL (ref 4.5–11.5)

## 2024-04-23 PROCEDURE — 36415 COLL VENOUS BLD VENIPUNCTURE: CPT

## 2024-04-23 PROCEDURE — 80048 BASIC METABOLIC PNL TOTAL CA: CPT

## 2024-04-23 PROCEDURE — 86901 BLOOD TYPING SEROLOGIC RH(D): CPT

## 2024-04-23 PROCEDURE — 85730 THROMBOPLASTIN TIME PARTIAL: CPT

## 2024-04-23 PROCEDURE — 85610 PROTHROMBIN TIME: CPT

## 2024-04-23 PROCEDURE — 86850 RBC ANTIBODY SCREEN: CPT

## 2024-04-23 PROCEDURE — 86900 BLOOD TYPING SEROLOGIC ABO: CPT

## 2024-04-23 PROCEDURE — 85027 COMPLETE CBC AUTOMATED: CPT

## 2024-04-24 ENCOUNTER — APPOINTMENT (OUTPATIENT)
Age: 54
End: 2024-04-24
Attending: INTERNAL MEDICINE
Payer: COMMERCIAL

## 2024-04-24 ENCOUNTER — ANESTHESIA (OUTPATIENT)
Age: 54
End: 2024-04-24
Payer: COMMERCIAL

## 2024-04-24 ENCOUNTER — ANESTHESIA EVENT (OUTPATIENT)
Age: 54
End: 2024-04-24
Payer: COMMERCIAL

## 2024-04-24 ENCOUNTER — HOSPITAL ENCOUNTER (OUTPATIENT)
Age: 54
Setting detail: OUTPATIENT SURGERY
Discharge: HOME | End: 2024-04-24
Admitting: INTERNAL MEDICINE
Payer: COMMERCIAL

## 2024-04-24 ENCOUNTER — HOSPITAL ENCOUNTER (OUTPATIENT)
Age: 54
End: 2024-04-24
Attending: INTERNAL MEDICINE | Admitting: INTERNAL MEDICINE
Payer: COMMERCIAL

## 2024-04-24 VITALS
BODY MASS INDEX: 23.15 KG/M2 | WEIGHT: 150 LBS | HEART RATE: 48 BPM | RESPIRATION RATE: 20 BRPM | SYSTOLIC BLOOD PRESSURE: 110 MMHG | DIASTOLIC BLOOD PRESSURE: 69 MMHG | TEMPERATURE: 97 F | OXYGEN SATURATION: 97 %

## 2024-04-24 DIAGNOSIS — R94.39 ABNORMAL STRESS TEST: ICD-10-CM

## 2024-04-24 DIAGNOSIS — R06.09 DOE (DYSPNEA ON EXERTION): ICD-10-CM

## 2024-04-24 DIAGNOSIS — R94.31 ABNORMAL EKG: ICD-10-CM

## 2024-04-24 DIAGNOSIS — I10 ESSENTIAL HYPERTENSION: ICD-10-CM

## 2024-04-24 DIAGNOSIS — I34.0 SEVERE MITRAL VALVE REGURGITATION: ICD-10-CM

## 2024-04-24 DIAGNOSIS — Z72.0 TOBACCO ABUSE: ICD-10-CM

## 2024-04-24 DIAGNOSIS — I51.7 LVH (LEFT VENTRICULAR HYPERTROPHY): ICD-10-CM

## 2024-04-24 DIAGNOSIS — I07.1 MODERATE TRICUSPID VALVE REGURGITATION: ICD-10-CM

## 2024-04-24 PROCEDURE — 2580000003 HC RX 258: Performed by: NURSE ANESTHETIST, CERTIFIED REGISTERED

## 2024-04-24 PROCEDURE — 7100000010 HC PHASE II RECOVERY - FIRST 15 MIN: Performed by: INTERNAL MEDICINE

## 2024-04-24 PROCEDURE — 6360000002 HC RX W HCPCS: Performed by: NURSE ANESTHETIST, CERTIFIED REGISTERED

## 2024-04-24 PROCEDURE — 93458 L HRT ARTERY/VENTRICLE ANGIO: CPT | Performed by: INTERNAL MEDICINE

## 2024-04-24 PROCEDURE — C1769 GUIDE WIRE: HCPCS | Performed by: INTERNAL MEDICINE

## 2024-04-24 PROCEDURE — 6360000002 HC RX W HCPCS: Performed by: INTERNAL MEDICINE

## 2024-04-24 PROCEDURE — 7100000011 HC PHASE II RECOVERY - ADDTL 15 MIN: Performed by: INTERNAL MEDICINE

## 2024-04-24 PROCEDURE — 3700000001 HC ADD 15 MINUTES (ANESTHESIA): Performed by: INTERNAL MEDICINE

## 2024-04-24 PROCEDURE — 2709999900 HC NON-CHARGEABLE SUPPLY: Performed by: INTERNAL MEDICINE

## 2024-04-24 PROCEDURE — 3700000000 HC ANESTHESIA ATTENDED CARE: Performed by: INTERNAL MEDICINE

## 2024-04-24 PROCEDURE — 6360000004 HC RX CONTRAST MEDICATION: Performed by: INTERNAL MEDICINE

## 2024-04-24 PROCEDURE — 93312 ECHO TRANSESOPHAGEAL: CPT

## 2024-04-24 PROCEDURE — 2500000003 HC RX 250 WO HCPCS: Performed by: INTERNAL MEDICINE

## 2024-04-24 PROCEDURE — C1894 INTRO/SHEATH, NON-LASER: HCPCS | Performed by: INTERNAL MEDICINE

## 2024-04-24 RX ORDER — FENTANYL CITRATE 50 UG/ML
INJECTION, SOLUTION INTRAMUSCULAR; INTRAVENOUS PRN
Status: DISCONTINUED | OUTPATIENT
Start: 2024-04-24 | End: 2024-04-24 | Stop reason: HOSPADM

## 2024-04-24 RX ORDER — PROPOFOL 10 MG/ML
INJECTION, EMULSION INTRAVENOUS PRN
Status: DISCONTINUED | OUTPATIENT
Start: 2024-04-24 | End: 2024-04-24 | Stop reason: SDUPTHER

## 2024-04-24 RX ORDER — SODIUM CHLORIDE 9 MG/ML
INJECTION, SOLUTION INTRAVENOUS CONTINUOUS PRN
Status: DISCONTINUED | OUTPATIENT
Start: 2024-04-24 | End: 2024-04-24 | Stop reason: SDUPTHER

## 2024-04-24 RX ORDER — VERAPAMIL HYDROCHLORIDE 2.5 MG/ML
INJECTION, SOLUTION INTRAVENOUS PRN
Status: DISCONTINUED | OUTPATIENT
Start: 2024-04-24 | End: 2024-04-24 | Stop reason: HOSPADM

## 2024-04-24 RX ORDER — NITROGLYCERIN 20 MG/100ML
INJECTION INTRAVENOUS PRN
Status: DISCONTINUED | OUTPATIENT
Start: 2024-04-24 | End: 2024-04-24 | Stop reason: HOSPADM

## 2024-04-24 RX ORDER — MIDAZOLAM HYDROCHLORIDE 1 MG/ML
INJECTION INTRAMUSCULAR; INTRAVENOUS PRN
Status: DISCONTINUED | OUTPATIENT
Start: 2024-04-24 | End: 2024-04-24 | Stop reason: HOSPADM

## 2024-04-24 RX ADMIN — SODIUM CHLORIDE: 9 INJECTION, SOLUTION INTRAVENOUS at 09:41

## 2024-04-24 RX ADMIN — PROPOFOL 330 MG: 10 INJECTION, EMULSION INTRAVENOUS at 10:02

## 2024-04-24 ASSESSMENT — LIFESTYLE VARIABLES: SMOKING_STATUS: 1

## 2024-04-24 ASSESSMENT — ENCOUNTER SYMPTOMS: SHORTNESS OF BREATH: 1

## 2024-04-24 NOTE — ANESTHESIA PRE PROCEDURE
• Alcohol use: Yes     Comment: occasionally 2-3drinks/week                                Ready to quit: Not Answered  Counseling given: Not Answered  Tobacco comments: 2 cigars/day      Vital Signs (Current):   Vitals:    04/24/24 0854   BP: (!) 168/90   Pulse: 58   Resp: 18   Temp: 36.1 °C (97 °F)   Weight: 68 kg (150 lb)                                              BP Readings from Last 3 Encounters:   04/24/24 (!) 168/90   03/14/24 120/80   02/29/24 (!) 150/92       NPO Status: Time of last liquid consumption:  (2000)                        Time of last solid consumption:  (2000)                        Date of last liquid consumption: 04/23/24                        Date of last solid food consumption: 04/23/24    BMI:   Wt Readings from Last 3 Encounters:   04/24/24 68 kg (150 lb)   03/14/24 68 kg (150 lb)   02/29/24 65.8 kg (145 lb)     Body mass index is 23.15 kg/m².    CBC:   Lab Results   Component Value Date/Time    WBC 8.2 04/23/2024 09:24 AM    RBC 5.13 04/23/2024 09:24 AM    HGB 14.6 04/23/2024 09:24 AM    HCT 44.5 04/23/2024 09:24 AM    MCV 86.7 04/23/2024 09:24 AM    RDW 16.3 04/23/2024 09:24 AM     04/23/2024 09:24 AM       CMP:   Lab Results   Component Value Date/Time     04/23/2024 09:24 AM    K 4.2 04/23/2024 09:24 AM     04/23/2024 09:24 AM    CO2 26 04/23/2024 09:24 AM    BUN 22 04/23/2024 09:24 AM    CREATININE 1.0 04/23/2024 09:24 AM    GFRAA >60 02/04/2020 11:13 AM    LABGLOM >90 04/23/2024 09:24 AM    GLUCOSE 92 04/23/2024 09:24 AM    PROT 7.5 03/02/2024 08:38 AM    CALCIUM 9.7 04/23/2024 09:24 AM    BILITOT 0.4 03/02/2024 08:38 AM    ALKPHOS 181 03/02/2024 08:38 AM    AST 20 03/02/2024 08:38 AM    ALT 22 03/02/2024 08:38 AM       POC Tests: No results for input(s): \"POCGLU\", \"POCNA\", \"POCK\", \"POCCL\", \"POCBUN\", \"POCHEMO\", \"POCHCT\" in the last 72 hours.    Coags:   Lab Results   Component Value Date/Time    PROTIME 10.8 04/23/2024 09:24 AM    INR 1.0 04/23/2024 09:24 AM

## 2024-04-24 NOTE — PROGRESS NOTES
Patient to CVL recovery area, post heart catheterization. Patient A&O x 3. VSS. Right radial site soft without oozing or hematoma. Vasc band intact. Patient taking PO well.

## 2024-04-24 NOTE — PROGRESS NOTES
Vasc band removed area cleansed, arm board and ivana applied. IV's removed. Discharge instructions given, patient verbalizes understanding. Patient discharged to home.

## 2024-04-24 NOTE — ANESTHESIA POSTPROCEDURE EVALUATION
Department of Anesthesiology  Postprocedure Note    Patient: Binh Erazo  MRN: [id number]  YOB: 1970  Date of evaluation: 4/24/2024    Procedure Summary       Date: 04/24/24 Room / Location: [facility] Cardiac Cath Lab; [facility] Noninvasive Cardiology    Anesthesia Start: 0941 Anesthesia Stop: 1024    Procedure: CAMILLE (PRN CONTRAST/BUBBLE/3D) Diagnosis:       Essential hypertension      LVH (left ventricular hypertrophy)      Moderate tricuspid valve regurgitation      Severe mitral valve regurgitation      Tobacco abuse      Abnormal EKG      LARSON (dyspnea on exertion)      Abnormal stress test    Scheduled Providers: Janet Haas MD Responsible Provider: Jaent Haas MD    Anesthesia Type: MAC ASA Status: 3            Anesthesia Type: No value filed.    Cristofer Phase I:      Cristofer Phase II:      Anesthesia Post Evaluation    Patient location during evaluation: PACU  Patient participation: complete - patient participated  Level of consciousness: awake  Pain score: 0  Airway patency: patent  Nausea & Vomiting: no nausea  Cardiovascular status: hemodynamically stable  Respiratory status: acceptable  Hydration status: stable    No notable events documented.

## 2024-04-24 NOTE — PROGRESS NOTES
Patient to CVL recovery area post CAMILLE, waiting for heart catheterization. Patient remains NPO. Patient up to BR, voided. Jones well.

## 2024-04-24 NOTE — H&P
History & Physical     CHIEF COMPLAINT: for CAMILLE/LHC for further assessment of severe MR on recent TTE 8/2023 and an abnormal stress test 2/2024    ADMITTING PHYSICIAN:  Dr Molina    DATE OF SERVICE: 4/24/2024     HISTORY OF PRESENT ILLNESS:  52 yo AAM known to Dr Mcnulty last seen in office 3/14/2024.     Present's today 4/24/2024  for CAMILLE/LHC for further assessment of his  severe MR     Denies any CP, SOB, palpitations or dizziness at present              Past Medical History:  Severe MR  8/2023 TTE: EF 55-60%. Moderate CLVH. Severely dilated LA. Moderate dilated RA. Severe MR,. Moderate TR. RVSP 45 mmHg. Mild PI.   2/26/2024 Lexiscan MPS: EF 60%. moderate severity left ventricular stress perfusion defect that is small in size present in the lateral segment(s) that is reversible. This defect was visualized during the stress phase of imaging.   HTN/LVH  Sinus bradycardia with recent decrease in Coreg dose  Cigars 1-2 daily  Marijuana use      Past Surgical History: L knee arthroscopy, nasal polyps removed.        Home Medications:    Prior to Admission medications    Medication Sig Start Date End Date Taking? Authorizing Provider   carvedilol (COREG) 3.125 MG tablet TAKE 1 TABLET BY MOUTH TWICE A DAY WITH MEALS 4/9/24   Palmer Mcnulty MD   lisinopril (PRINIVIL;ZESTRIL) 40 MG tablet Take 1 tablet by mouth daily 3/16/24   Palmer Mcnulty MD   hydroCHLOROthiazide (HYDRODIURIL) 25 MG tablet Take 1 tablet by mouth daily    Provider, MD Saida   amLODIPine (NORVASC) 5 MG tablet Take 2 tablets by mouth daily 2/4/20   Polly Flores DO   ibuprofen (ADVIL;MOTRIN) 800 MG tablet Take 1 tablet by mouth every 8 hours as needed for Pain 2/4/20   Polly Flores DO       Hospital Medications:  No current facility-administered medications for this encounter.    Allergies:  Cat hair extract and Grass extracts [gramineae pollens]    Social History:    1-2 Black and Mild's daily  Marijuana used last evening  ETOH

## 2024-04-28 LAB — ECHO LV EF PHYSICIAN: 60 %

## 2024-04-29 ENCOUNTER — OFFICE VISIT (OUTPATIENT)
Dept: CARDIOLOGY CLINIC | Age: 54
End: 2024-04-29
Payer: COMMERCIAL

## 2024-04-29 VITALS
SYSTOLIC BLOOD PRESSURE: 130 MMHG | HEIGHT: 68 IN | WEIGHT: 154 LBS | DIASTOLIC BLOOD PRESSURE: 82 MMHG | BODY MASS INDEX: 23.34 KG/M2 | RESPIRATION RATE: 16 BRPM | HEART RATE: 52 BPM

## 2024-04-29 DIAGNOSIS — Z72.0 TOBACCO ABUSE: ICD-10-CM

## 2024-04-29 DIAGNOSIS — R94.39 ABNORMAL NUCLEAR STRESS TEST: ICD-10-CM

## 2024-04-29 DIAGNOSIS — I51.7 LVH (LEFT VENTRICULAR HYPERTROPHY): ICD-10-CM

## 2024-04-29 DIAGNOSIS — R94.31 ABNORMAL EKG: ICD-10-CM

## 2024-04-29 DIAGNOSIS — R06.09 DOE (DYSPNEA ON EXERTION): ICD-10-CM

## 2024-04-29 DIAGNOSIS — I10 ESSENTIAL HYPERTENSION: ICD-10-CM

## 2024-04-29 DIAGNOSIS — I34.0 SEVERE MITRAL VALVE REGURGITATION: Primary | ICD-10-CM

## 2024-04-29 DIAGNOSIS — I07.1 MODERATE TRICUSPID VALVE REGURGITATION: ICD-10-CM

## 2024-04-29 DIAGNOSIS — I34.0 SEVERE MITRAL REGURGITATION: ICD-10-CM

## 2024-04-29 PROCEDURE — 99214 OFFICE O/P EST MOD 30 MIN: CPT | Performed by: INTERNAL MEDICINE

## 2024-04-29 PROCEDURE — 3075F SYST BP GE 130 - 139MM HG: CPT | Performed by: INTERNAL MEDICINE

## 2024-04-29 PROCEDURE — 93000 ELECTROCARDIOGRAM COMPLETE: CPT | Performed by: INTERNAL MEDICINE

## 2024-04-29 PROCEDURE — 3079F DIAST BP 80-89 MM HG: CPT | Performed by: INTERNAL MEDICINE

## 2024-04-29 RX ORDER — ERGOCALCIFEROL 1.25 MG/1
CAPSULE ORAL
COMMUNITY
Start: 2024-03-26

## 2024-04-29 RX ORDER — IBUPROFEN 200 MG
200 TABLET ORAL EVERY 6 HOURS PRN
COMMUNITY

## 2024-04-29 NOTE — PROGRESS NOTES
Out PATIENT New CARDIOLOGY CONSULT    Name: Binh Erazo    Age: 53 y.o.    Date of Admission: No admission date for patient encounter.    Date of Service: 5/1/2024    Reason for Consultation:   Chief Complaint   Patient presents with    Shortness of Breath          Referring Physician: No admitting provider for patient encounter.    History of Present Illness: 53-year-old male with history of hypertension, degenerative joint disease and tobacco abuse who presents for follow-up visit.  He underwent cardiac catheterization on April 24, 2024 and found to have multivessel disease and severe mitral valve regurgitation now awaiting CABG and mitral valve replacement. patient is advised to go to the emergency room if he develops symptoms prior to CABG.  He reported pain at the site of the right wrist where cardiac catheterization was performed but the patient declined ultrasound of the wrist to rule out AV fistula        Echocardiogram reviewed: TTE 8/2023   Summary   Estimated left ventricle ejection fraction 55 to 60 %.   Moderate left ventricular concentric hypertrophy noted.   The left atrium is severely dilated.   Normal right ventricular size and function.   Moderate dilated right atrium.   Severe mitral regurgitation is present.   Moderate tricuspid regurgitation.   RVSP is 45 mmHg.   Mild pulmonic regurgitation present.    Past Medical History:  Past Medical History:   Diagnosis Date    Hypertension        Past Surgical History:  Past Surgical History:   Procedure Laterality Date    CARDIAC PROCEDURE N/A 4/24/2024    Left heart cath / coronary angiography performed by Ceasar Molina [place] [facility]    KNEE SURGERY      NOSE SURGERY         Family History:  History reviewed. No pertinent family history.    Social History:  Social History     Socioeconomic History    Marital status:      Spouse name: Not on file    Number of children: Not on file    Years of education: Not on file

## 2024-05-01 ENCOUNTER — TELEPHONE (OUTPATIENT)
Dept: CARDIOLOGY CLINIC | Age: 54
End: 2024-05-01

## 2024-05-01 RX ORDER — ATORVASTATIN CALCIUM 40 MG/1
40 TABLET, FILM COATED ORAL DAILY
Qty: 90 TABLET | Refills: 3 | Status: SHIPPED | OUTPATIENT
Start: 2024-05-01

## 2024-05-01 RX ORDER — ASPIRIN 81 MG/1
81 TABLET ORAL DAILY
Qty: 90 TABLET | Refills: 0 | Status: CANCELLED | OUTPATIENT
Start: 2024-05-01

## 2024-05-01 RX ORDER — ASPIRIN 81 MG/1
81 TABLET ORAL DAILY
Qty: 90 TABLET | Refills: 0 | Status: SHIPPED | OUTPATIENT
Start: 2024-05-01

## 2024-05-01 NOTE — TELEPHONE ENCOUNTER
----- Message from Palmer Mcnulty MD sent at 5/1/2024  8:19 AM EDT -----  Please notify patient that I have added aspirin 81 mg a day and Lipitor 40 mg a day due to his blockages

## 2024-05-02 NOTE — TELEPHONE ENCOUNTER
Patient notified of new medications and has already started taking the Aspirin and Lipitor per Dr. Mcnulty's  recommendations.

## 2024-05-09 ENCOUNTER — OFFICE VISIT (OUTPATIENT)
Dept: CARDIOTHORACIC SURGERY | Age: 54
End: 2024-05-09
Payer: COMMERCIAL

## 2024-05-09 VITALS
HEIGHT: 67 IN | HEART RATE: 61 BPM | SYSTOLIC BLOOD PRESSURE: 146 MMHG | WEIGHT: 150 LBS | BODY MASS INDEX: 23.54 KG/M2 | RESPIRATION RATE: 20 BRPM | DIASTOLIC BLOOD PRESSURE: 84 MMHG

## 2024-05-09 DIAGNOSIS — I34.0 SEVERE MITRAL REGURGITATION: Primary | ICD-10-CM

## 2024-05-09 PROCEDURE — 3077F SYST BP >= 140 MM HG: CPT | Performed by: THORACIC SURGERY (CARDIOTHORACIC VASCULAR SURGERY)

## 2024-05-09 PROCEDURE — 3079F DIAST BP 80-89 MM HG: CPT | Performed by: THORACIC SURGERY (CARDIOTHORACIC VASCULAR SURGERY)

## 2024-05-09 PROCEDURE — 99204 OFFICE O/P NEW MOD 45 MIN: CPT | Performed by: THORACIC SURGERY (CARDIOTHORACIC VASCULAR SURGERY)

## 2024-05-09 NOTE — PROGRESS NOTES
[facility]  Visit Note      Patient name: Binh Erazo    Reason for consult: CAD, MR    Referring Physician: Dr. Molina    Primary Care Physician: Spenser Gonsalez DO    Date of service: 5/9/2024    Chief Complaint: CAD, MR    HPI:  54 yo AAM who presents for evaluation of multivessel CAD and mitral regurgitation. PMH significant for HTN, tobacco abuse and arthritis.     He had follow up with cardiology in March where he complained of fatigue ad LARSON. He underwent outpatient LHC and CAMILLE after previous echo showed severe MR. These revealed significant CAD and severe MR (full reports below).    CAMILLE 4/28/24:    Left Ventricle: Normal left ventricular systolic function. EF by visual approximation is 60%. Left ventricle size is normal.    Right Ventricle: Right ventricle size is normal. Normal systolic function.    Mitral Valve: Posterior leaflet prolapse noted. Severe regurgitation with an anterior directed jet.    Left Atrium: Left atrium is dilated. No left atrial appendage thrombus noted.    Interatrial Septum: No evidence of right to left interatrial shunting on bubble study.      [facility] 4/24/24:  1.  Coronary artery disease  Left main: No significant angiographic  LAD: Focal 70% stenosis just after the first diagonal and before the first septal  branch.  70% apical LAD disease after it wraparound apex 70% ostial stenosis of a moderate-sized first diagonal branch  Intermediate ramus/high first obtuse marginal: No significant angiographic disease  LCx: 70% stenosis before the first obtuse marginal branch 70% ostial stenosis of the obtuse marginal branch long irregular up to 70% mid left circumflex stenosis before the terminal lateral branch.  RCA: Dominant vessel which is excessively tortuous with 50% proximal vessel stenosis and 40% mid vessel disease  2.  Left ventricular end-diastolic pressure = 2 mmHg    The patient currently denies CP, SOB at rest, new LE edema, N/V, F/C, rigors,

## 2024-06-14 ENCOUNTER — TELEPHONE (OUTPATIENT)
Dept: CARDIOTHORACIC SURGERY | Age: 54
End: 2024-06-14

## 2024-06-18 ENCOUNTER — PREP FOR PROCEDURE (OUTPATIENT)
Dept: CARDIOTHORACIC SURGERY | Age: 54
End: 2024-06-18

## 2024-06-18 DIAGNOSIS — I34.0 SEVERE MITRAL VALVE REGURGITATION: ICD-10-CM

## 2024-06-18 DIAGNOSIS — I73.9 PVD (PERIPHERAL VASCULAR DISEASE) (HCC): ICD-10-CM

## 2024-06-18 DIAGNOSIS — I07.1 MODERATE TRICUSPID VALVE REGURGITATION: ICD-10-CM

## 2024-06-18 DIAGNOSIS — Z01.818 PRE-OP TESTING: Primary | ICD-10-CM

## 2024-06-18 DIAGNOSIS — I25.10 CAD IN NATIVE ARTERY: ICD-10-CM

## 2024-06-18 DIAGNOSIS — I34.0 SEVERE MITRAL REGURGITATION: ICD-10-CM

## 2024-06-18 DIAGNOSIS — R09.89 BRUIT: ICD-10-CM

## 2024-06-18 RX ORDER — SODIUM CHLORIDE 0.9 % (FLUSH) 0.9 %
5-40 SYRINGE (ML) INJECTION PRN
Status: CANCELLED | OUTPATIENT
Start: 2024-06-18

## 2024-06-18 RX ORDER — SODIUM CHLORIDE 9 MG/ML
INJECTION, SOLUTION INTRAVENOUS PRN
Status: CANCELLED | OUTPATIENT
Start: 2024-06-18

## 2024-06-18 RX ORDER — SODIUM CHLORIDE 0.9 % (FLUSH) 0.9 %
5-40 SYRINGE (ML) INJECTION EVERY 12 HOURS SCHEDULED
Status: CANCELLED | OUTPATIENT
Start: 2024-06-18

## 2024-06-18 RX ORDER — SODIUM CHLORIDE 9 MG/ML
INJECTION, SOLUTION INTRAVENOUS CONTINUOUS
Status: CANCELLED | OUTPATIENT
Start: 2024-06-18

## 2024-06-25 ENCOUNTER — HOSPITAL ENCOUNTER (OUTPATIENT)
Dept: PULMONOLOGY | Age: 54
Discharge: HOME | End: 2024-06-25
Payer: COMMERCIAL

## 2024-06-25 DIAGNOSIS — I07.1 MODERATE TRICUSPID VALVE REGURGITATION: ICD-10-CM

## 2024-06-25 DIAGNOSIS — I34.0 SEVERE MITRAL VALVE REGURGITATION: ICD-10-CM

## 2024-06-25 DIAGNOSIS — Z01.818 PRE-OP TESTING: ICD-10-CM

## 2024-06-25 DIAGNOSIS — I25.10 CAD IN NATIVE ARTERY: ICD-10-CM

## 2024-06-25 PROCEDURE — 94375 RESPIRATORY FLOW VOLUME LOOP: CPT

## 2024-06-25 PROCEDURE — 94729 DIFFUSING CAPACITY: CPT

## 2024-06-26 ENCOUNTER — TELEPHONE (OUTPATIENT)
Dept: CARDIOTHORACIC SURGERY | Age: 54
End: 2024-06-26

## 2024-06-26 PROBLEM — Z01.818 PRE-OP TESTING: Status: ACTIVE | Noted: 2024-06-26

## 2024-06-26 NOTE — TELEPHONE ENCOUNTER
Pt denial for out of network for the facility. Please advise if patient will be canceled. Emailed forwarded to all with denial notification

## 2024-06-26 NOTE — PROCEDURES
[facility]              [address] [place] [address]                           PULMONARY FUNCTION      PATIENT NAME: SHAUN SIMMONS             : 1970  MED REC NO: [id number]                        ROOM:   ACCOUNT NO: [id number]                       ADMIT DATE: 2024  PROVIDER: Juan Sanabria MD      DATE OF PROCEDURE:  2024    INTERPRETING PHYSICIAN:  Juan Sanabria MD.    PROCEDURE:  Spirometry without bronchodilator.    CLINICAL INDICATION:  Preoperative evaluation.    PURPOSE:  Diagnostic.    MEDICATIONS:  None.    LUNG MECHANICS:  There appears to be no obstructive or restrictive component of lung disease according to graphic and numeric representation of patient's lung volumes.  Pattern of flow volume loop supports this.  FEV1/FVC 0.74.  FEV1 3.22 L, 108% of predicted with a Z-score of 0.55.  FVC 4.33 L, 115% of predicted with Z-score of 1.03.    MVV 73% of predicted.    Inspiratory phase of flow volume loop shows adequate upper airway laryngeal and pharyngeal function.  Expiratory phase of flow volume loop shows adequate chest wall strength.    LUNG VOLUMES:  Not completed.    LUNG DIFFUSION:  DLCO corrected for alveolar volume is at 61% of predicted.  This may be consistent with a pulmonary veno-occlusive defect or a pulmonary vascular disease.    IMPRESSION:  Clinically most compatible with a normal spirometry aside from the isolated low DLCO, which may reflect a pulmonary vascular disease or an underlying pulmonary veno-occlusive defect.  Clinical correlation is recommended.    Thank you very much for allowing me to participate in this patient's care.          JUAN SANABRIA MD      D:  2024 16:34:38     T:  2024 03:48:04     AMB/AQS  Job #:  [id number]     Doc#:  [id number]

## 2024-06-27 PROBLEM — I34.0 SEVERE MITRAL REGURGITATION: Status: ACTIVE | Noted: 2024-06-18

## 2024-06-27 NOTE — TELEPHONE ENCOUNTER
I spoke to pt he is going to call his insurance to see if they would cover part or any of this or if there is a way to appeal this. He doesn't want to start all over again.

## 2024-06-27 NOTE — TELEPHONE ENCOUNTER
Pt called back. He spoke to insurance they stated since he was so far along in process that they would cover the surgery & we would know what needed to be done? It can be resubmitted. I sent message to auth team unsure if anything needs to be done on our end such as an appeal letter. I will await response from auth team

## 2024-06-28 NOTE — TELEPHONE ENCOUNTER
Pt notified of new surgery date 7/19. Informed that insurance auth team is working on new auth.  Instructed to go forward with testing on 7/3 arrive @ 8:30 am

## 2024-07-02 NOTE — TELEPHONE ENCOUNTER
Lm notifying pt he needs to see his PCP . His insurance is requesting a current note from them prior to doing the reconsideration.

## 2024-07-02 NOTE — TELEPHONE ENCOUNTER
In regards to the patient Binh Erazo, : 1970, I came into office this morning to a voice message from his insurance company [facility]. Per Flakita at [facility], patient must see a PCP prior to this procedure being considered for authorization. They do not show a pcp on file so patient will need to contact them right away with a pcp name and appointment.   FLORA

## 2024-07-03 ENCOUNTER — HOSPITAL ENCOUNTER (OUTPATIENT)
Dept: INTERVENTIONAL RADIOLOGY/VASCULAR | Age: 54
Discharge: HOME | End: 2024-07-05
Payer: COMMERCIAL

## 2024-07-03 ENCOUNTER — HOSPITAL ENCOUNTER (OUTPATIENT)
Dept: PREADMISSION TESTING | Age: 54
Discharge: HOME | End: 2024-07-03

## 2024-07-03 ENCOUNTER — HOSPITAL ENCOUNTER (OUTPATIENT)
Dept: ULTRASOUND IMAGING | Age: 54
Discharge: HOME | End: 2024-07-05
Payer: COMMERCIAL

## 2024-07-03 ENCOUNTER — HOSPITAL ENCOUNTER (OUTPATIENT)
Dept: CT IMAGING | Age: 54
Discharge: HOME | End: 2024-07-05
Payer: COMMERCIAL

## 2024-07-03 DIAGNOSIS — I73.9 PVD (PERIPHERAL VASCULAR DISEASE) (HCC): ICD-10-CM

## 2024-07-03 DIAGNOSIS — Z01.818 PRE-OP TESTING: ICD-10-CM

## 2024-07-03 DIAGNOSIS — R09.89 BRUIT: ICD-10-CM

## 2024-07-03 DIAGNOSIS — I07.1 MODERATE TRICUSPID VALVE REGURGITATION: ICD-10-CM

## 2024-07-03 DIAGNOSIS — I34.0 SEVERE MITRAL VALVE REGURGITATION: ICD-10-CM

## 2024-07-03 DIAGNOSIS — I25.10 CAD IN NATIVE ARTERY: ICD-10-CM

## 2024-07-03 PROCEDURE — 93923 UPR/LXTR ART STDY 3+ LVLS: CPT

## 2024-07-03 PROCEDURE — 93880 EXTRACRANIAL BILAT STUDY: CPT

## 2024-07-03 PROCEDURE — 93922 UPR/L XTREMITY ART 2 LEVELS: CPT

## 2024-07-03 PROCEDURE — 71250 CT THORAX DX C-: CPT

## 2024-07-03 PROCEDURE — 93970 EXTREMITY STUDY: CPT

## 2024-07-03 NOTE — PROGRESS NOTES
[facility]   PRE-ADMISSION TESTING GENERAL INSTRUCTIONS  PAT Phone Number: [phone]      GENERAL INSTRUCTIONS:    [x] Hibiclens shower the night before AND the morning of surgery.   -Do not use Hibiclens on your face or head.  [x] Do not wear makeup, lotions, powders, deodorant the morning of surgery.  [x] Nothing to eat or drink after midnight. This includes no gum, candy, mints or water.  [x] You may brush your teeth, gargle, but do NOT swallow water.   [x] No tobacco products, illegal drugs, or alcohol within 24 hours of your surgery.  [x] Jewelry or valuables should not be brought to the hospital. All body and/or tongue piercing's must be removed prior to arriving to hospital. No contact lens or hair pins.   [x] Bring insurance card and photo ID.  [x] Transfusion (Green) Bracelet: Please bring with you to hospital, day of surgery.     PARKING INSTRUCTIONS:     [x] ARRIVAL DATE & TIME: 7/19 at 5am  [x] Times are subject to change. We will contact you the business day before surgery if that were to occur.  [x] Enter into the [facility] Entrance. Two people may accompany you. Masks are not required.  [x] Parking Lot \"I\" is where you will park. It is located on the corner of [address]. The entrance is on [address].   Only one vehicle - per patient, is permitted in parking lot.   Upon entering the parking lot, a voucher ticket will print.    EDUCATION INSTRUCTIONS:            [x] Pre-admission Testing educational folder given  [x] Incentive Spirometry,coughing & deep breathing exercises reviewed.   [x] Fluoroscopy-Xray used in surgery reviewed with patient. Educational pamphlet placed in chart.  [x] Pain: Post-op pain is normal and to be expected. You will be asked to rate your pain from 0-10.  [x] Follow instruction sheet for Ensure/ Protein drinks - provided to you during PAT apt.    MEDICATION INSTRUCTIONS:    [x] Bring a complete list of your

## 2024-07-08 NOTE — TELEPHONE ENCOUNTER
Spoke to pt for update on PCP. He is going tomorrow 7/9. Informed him to have ov note faxed to our office. Fax# provided. Insurance can not restart process of reconsideration until note has been submitted.

## 2024-07-09 NOTE — TELEPHONE ENCOUNTER
Pt had note sent over from PCP. Sent note to insurance team. Joy now stated PCP office has to call [facility] and place a referral to our office. Pt has to call [facility] &add Sunshine as his PCP. Prior to resubmitting

## 2024-07-10 NOTE — TELEPHONE ENCOUNTER
[facility]ernandez office notified 7/9 that they need to place a referral to Magdalena through [facility]. Requirement of [facility] prior to doing reconsideration for surgery. Pt notified to contact [facility] & have Sunshine added as his PCP.  Pt called back upon completion.

## 2024-07-12 ENCOUNTER — HOSPITAL ENCOUNTER (OUTPATIENT)
Dept: PREADMISSION TESTING | Age: 54
Discharge: HOME | End: 2024-07-12
Payer: COMMERCIAL

## 2024-07-12 VITALS
WEIGHT: 154 LBS | SYSTOLIC BLOOD PRESSURE: 168 MMHG | BODY MASS INDEX: 24.17 KG/M2 | RESPIRATION RATE: 16 BRPM | DIASTOLIC BLOOD PRESSURE: 84 MMHG | TEMPERATURE: 98 F | HEIGHT: 67 IN | HEART RATE: 53 BPM | OXYGEN SATURATION: 98 %

## 2024-07-12 DIAGNOSIS — I07.1 MODERATE TRICUSPID VALVE REGURGITATION: ICD-10-CM

## 2024-07-12 DIAGNOSIS — Z01.818 PRE-OP TESTING: ICD-10-CM

## 2024-07-12 DIAGNOSIS — I34.0 SEVERE MITRAL VALVE REGURGITATION: ICD-10-CM

## 2024-07-12 DIAGNOSIS — I25.10 CAD IN NATIVE ARTERY: ICD-10-CM

## 2024-07-12 LAB
ABO + RH BLD: NORMAL
ALBUMIN SERPL-MCNC: 4.3 G/DL (ref 3.5–5.2)
ALP SERPL-CCNC: 134 U/L (ref 40–129)
ALT SERPL-CCNC: 19 U/L (ref 0–40)
ANION GAP SERPL CALCULATED.3IONS-SCNC: 12 MMOL/L (ref 7–16)
ARM BAND NUMBER: NORMAL
AST SERPL-CCNC: 19 U/L (ref 0–39)
BILIRUB SERPL-MCNC: 0.5 MG/DL (ref 0–1.2)
BILIRUB UR QL STRIP: NEGATIVE
BLOOD BANK SAMPLE EXPIRATION: NORMAL
BLOOD GROUP ANTIBODIES SERPL: NEGATIVE
BUN SERPL-MCNC: 19 MG/DL (ref 6–20)
CALCIUM SERPL-MCNC: 10 MG/DL (ref 8.6–10.2)
CHLORIDE SERPL-SCNC: 101 MMOL/L (ref 98–107)
CLARITY UR: CLEAR
CO2 SERPL-SCNC: 25 MMOL/L (ref 22–29)
COLOR UR: YELLOW
COMMENT: NORMAL
CREAT SERPL-MCNC: 1.1 MG/DL (ref 0.7–1.2)
EKG ATRIAL RATE: 52 BPM
EKG P AXIS: 26 DEGREES
EKG P-R INTERVAL: 184 MS
EKG Q-T INTERVAL: 454 MS
EKG QRS DURATION: 90 MS
EKG QTC CALCULATION (BAZETT): 422 MS
EKG R AXIS: 13 DEGREES
EKG T AXIS: 31 DEGREES
EKG VENTRICULAR RATE: 52 BPM
ERYTHROCYTE [DISTWIDTH] IN BLOOD BY AUTOMATED COUNT: 14.1 % (ref 11.5–15)
GFR, ESTIMATED: 82 ML/MIN/1.73M2
GLUCOSE SERPL-MCNC: 100 MG/DL (ref 74–99)
GLUCOSE UR STRIP-MCNC: NEGATIVE MG/DL
HBA1C MFR BLD: 5.7 % (ref 4–5.6)
HCT VFR BLD AUTO: 40.2 % (ref 37–54)
HGB BLD-MCNC: 13.2 G/DL (ref 12.5–16.5)
HGB UR QL STRIP.AUTO: NEGATIVE
INR PPP: 1
KETONES UR STRIP-MCNC: NEGATIVE MG/DL
LEUKOCYTE ESTERASE UR QL STRIP: NEGATIVE
MCH RBC QN AUTO: 29.7 PG (ref 26–35)
MCHC RBC AUTO-ENTMCNC: 32.8 G/DL (ref 32–34.5)
MCV RBC AUTO: 90.3 FL (ref 80–99.9)
NITRITE UR QL STRIP: NEGATIVE
PARTIAL THROMBOPLASTIN TIME: 33.4 SEC (ref 24.5–35.1)
PH UR STRIP: 7 [PH] (ref 5–9)
PLATELET # BLD AUTO: 206 K/UL (ref 130–450)
PMV BLD AUTO: 10.8 FL (ref 7–12)
POTASSIUM SERPL-SCNC: 4.2 MMOL/L (ref 3.5–5)
PROT SERPL-MCNC: 8 G/DL (ref 6.4–8.3)
PROT UR STRIP-MCNC: NEGATIVE MG/DL
PROTHROMBIN TIME: 11 SEC (ref 9.3–12.4)
RBC # BLD AUTO: 4.45 M/UL (ref 3.8–5.8)
SODIUM SERPL-SCNC: 138 MMOL/L (ref 132–146)
SP GR UR STRIP: 1.02 (ref 1–1.03)
UROBILINOGEN UR STRIP-ACNC: 0.2 EU/DL (ref 0–1)
WBC OTHER # BLD: 8.3 K/UL (ref 4.5–11.5)

## 2024-07-12 PROCEDURE — 85027 COMPLETE CBC AUTOMATED: CPT

## 2024-07-12 PROCEDURE — 86850 RBC ANTIBODY SCREEN: CPT

## 2024-07-12 PROCEDURE — 85610 PROTHROMBIN TIME: CPT

## 2024-07-12 PROCEDURE — 83036 HEMOGLOBIN GLYCOSYLATED A1C: CPT

## 2024-07-12 PROCEDURE — 86900 BLOOD TYPING SEROLOGIC ABO: CPT

## 2024-07-12 PROCEDURE — 86901 BLOOD TYPING SEROLOGIC RH(D): CPT

## 2024-07-12 PROCEDURE — 36415 COLL VENOUS BLD VENIPUNCTURE: CPT

## 2024-07-12 PROCEDURE — 80053 COMPREHEN METABOLIC PANEL: CPT

## 2024-07-12 PROCEDURE — 87086 URINE CULTURE/COLONY COUNT: CPT

## 2024-07-12 PROCEDURE — 85730 THROMBOPLASTIN TIME PARTIAL: CPT

## 2024-07-12 PROCEDURE — 87081 CULTURE SCREEN ONLY: CPT

## 2024-07-12 PROCEDURE — 81003 URINALYSIS AUTO W/O SCOPE: CPT

## 2024-07-13 LAB
MICROORGANISM SPEC CULT: ABNORMAL
MICROORGANISM SPEC CULT: NORMAL
SERVICE CMNT-IMP: ABNORMAL
SPECIMEN DESCRIPTION: ABNORMAL
SPECIMEN DESCRIPTION: NORMAL

## 2024-07-16 ENCOUNTER — TELEPHONE (OUTPATIENT)
Dept: CARDIOTHORACIC SURGERY | Age: 54
End: 2024-07-16

## 2024-07-17 NOTE — TELEPHONE ENCOUNTER
Correction- it looks like he came for repeat labs before he was reapproved on the 12th so those will be adequate. No labs needed today then

## 2024-07-17 NOTE — H&P
HPI: Mr. Erazo is a 54 yo male who presented for evaluation of multivessel CAD and mitral regurgitation. PMH significant for HTN, tobacco abuse and arthritis.      He had follow up with cardiology in March where he complained of fatigue ad LARSON. He underwent outpatient LHC and CAMILLE after previous echo showed severe MR. These revealed significant CAD and severe MR (full reports below). The patient currently denies CP, SOB at rest, new LE edema, N/V, F/C, rigors, chills, orthopnea, PND and syncope.     CAMILLE 4/28/24:    Left Ventricle: Normal left ventricular systolic function. EF by visual approximation is 60%. Left ventricle size is normal.    Right Ventricle: Right ventricle size is normal. Normal systolic function.    Mitral Valve: Posterior leaflet prolapse noted. Severe regurgitation with an anterior directed jet.    Left Atrium: Left atrium is dilated. No left atrial appendage thrombus noted.    Interatrial Septum: No evidence of right to left interatrial shunting on bubble study.        LHC 4/24/24:  1.  Coronary artery disease  Left main: No significant angiographic  LAD: Focal 70% stenosis just after the first diagonal and before the first septal  branch.  70% apical LAD disease after it wraparound apex 70% ostial stenosis of a moderate-sized first diagonal branch  Intermediate ramus/high first obtuse marginal: No significant angiographic disease  LCx: 70% stenosis before the first obtuse marginal branch 70% ostial stenosis of the obtuse marginal branch long irregular up to 70% mid left circumflex stenosis before the terminal lateral branch.  RCA: Dominant vessel which is excessively tortuous with 50% proximal vessel stenosis and 40% mid vessel disease  2.  Left ventricular end-diastolic pressure = 2 mmHg              Past Medical History        Past Medical History:   Diagnosis Date    Hypertension           Past Surgical History         Past Surgical History:   Procedure Laterality Date    CARDIAC

## 2024-07-18 ENCOUNTER — ANESTHESIA EVENT (OUTPATIENT)
Dept: OPERATING ROOM | Age: 54
End: 2024-07-18
Payer: COMMERCIAL

## 2024-07-18 RX ORDER — SODIUM CHLORIDE 9 MG/ML
INJECTION, SOLUTION INTRAVENOUS CONTINUOUS
Status: CANCELLED | OUTPATIENT
Start: 2024-07-18

## 2024-07-18 RX ORDER — SODIUM CHLORIDE 0.9 % (FLUSH) 0.9 %
5-40 SYRINGE (ML) INJECTION PRN
Status: CANCELLED | OUTPATIENT
Start: 2024-07-18

## 2024-07-18 RX ORDER — SODIUM CHLORIDE 0.9 % (FLUSH) 0.9 %
5-40 SYRINGE (ML) INJECTION EVERY 12 HOURS SCHEDULED
Status: CANCELLED | OUTPATIENT
Start: 2024-07-18

## 2024-07-18 RX ORDER — SODIUM CHLORIDE 9 MG/ML
INJECTION, SOLUTION INTRAVENOUS PRN
Status: CANCELLED | OUTPATIENT
Start: 2024-07-18

## 2024-07-18 NOTE — PROGRESS NOTES
PERTINENT CARDIAC HISTORY: HTN, VHD    Estimated body mass index is 24.12 kg/m² as calculated from the following:    Height as of 7/12/24: 1.702 m (5' 7\").    Weight as of 7/12/24: 69.9 kg (154 lb).      Pre-operative testing review:   --carotids with no significant stenosis  --ramesh with good flow bilaterally  --upper extremity studies with good flow   --ct chest reviewed  --TTE with no significant LV dysfunction, severe MR  --pft with FEV1 108 percent of predicted and DLCO 62 percent of predicted  --lower extremity vein mapping completed and read  --hgA1c 5.7  --Right arm BP: 158/87 (111)       Left arm BP: 168/84 (112)   --Does the patient have a history of atrial fibrillation/flutter?NO  --dental clearance obtained and in media  --H&P on chart from date 7/17/2024, to be updated DOS      Recent Labs     07/12/24  0920   HGB 13.2   HCT 40.2        Recent Labs     07/12/24  0920   BUN 19   CREATININE 1.1       Results       Procedure Component Value Units Date/Time    Culture, Urine [[id number]]  (Abnormal) Collected: 07/12/24 0920    Order Status: Completed Specimen: Urine, clean catch Updated: 07/13/24 0715     Specimen Description .CLEAN CATCH URINE     Special Requests Site: Urine     Culture MIXED  Gram Positive Organism  1,000 TO 10,000 CFU/ML      Culture, MRSA, Screening - All markets except [facility] [[id number]] Collected: 07/12/24 0857    Order Status: Completed Specimen: Nares Updated: 07/13/24 1412     Specimen Description .NARES     Culture NEGATIVE FOR: METHICILLIN RESISTANT STAPHYLOCOCCUS AUREUS                 Creatinine   Date/Time Value Ref Range Status   07/12/2024 09:20 AM 1.1 0.70 - 1.20 mg/dL Final   04/23/2024 09:24 AM 1.0 0.70 - 1.20 mg/dL Final   03/02/2024 08:38 AM 0.9 0.70 - 1.20 mg/dL Final           [id number] Score for Atrial Fibrillation Stroke Risk   Risk   Factors  Component Value   C CHF  0   H HTN  1   A2 Age >= 75  0   D DM  1   S2 Prior Stroke/TIA  0   V Vascular Disease

## 2024-07-19 ENCOUNTER — ANESTHESIA (OUTPATIENT)
Dept: OPERATING ROOM | Age: 54
End: 2024-07-19
Payer: COMMERCIAL

## 2024-07-19 ENCOUNTER — APPOINTMENT (OUTPATIENT)
Dept: GENERAL RADIOLOGY | Age: 54
End: 2024-07-19
Attending: THORACIC SURGERY (CARDIOTHORACIC VASCULAR SURGERY)
Payer: COMMERCIAL

## 2024-07-19 ENCOUNTER — HOSPITAL ENCOUNTER (INPATIENT)
Age: 54
LOS: 6 days | Discharge: HOME | End: 2024-07-25
Attending: THORACIC SURGERY (CARDIOTHORACIC VASCULAR SURGERY) | Admitting: THORACIC SURGERY (CARDIOTHORACIC VASCULAR SURGERY)
Payer: COMMERCIAL

## 2024-07-19 DIAGNOSIS — I34.0 SEVERE MITRAL REGURGITATION: Primary | ICD-10-CM

## 2024-07-19 DIAGNOSIS — Z95.1 S/P CABG (CORONARY ARTERY BYPASS GRAFT): ICD-10-CM

## 2024-07-19 LAB
AADO2: 124.6 MMHG
AADO2: 165.2 MMHG
ABO/RH: NORMAL
ACTIVATED CLOTTING TIME, HIGH RANGE: 121 SEC
ACTIVATED CLOTTING TIME, HIGH RANGE: 494 SEC
ACTIVATED CLOTTING TIME, HIGH RANGE: 515 SEC
ACTIVATED CLOTTING TIME, HIGH RANGE: 518 SEC
ACTIVATED CLOTTING TIME, HIGH RANGE: 720 SEC
ACTIVATED CLOTTING TIME, HIGH RANGE: 82 SEC
ACTIVATED CLOTTING TIME, HIGH RANGE: 822 SEC
ACTIVATED CLOTTING TIME, HIGH RANGE: 843 SEC
ANION GAP SERPL CALCULATED.3IONS-SCNC: 10 MMOL/L (ref 7–16)
ANTIBODY SCREEN: NEGATIVE
ARM BAND NUMBER: NORMAL
B.E.: -2.6 MMOL/L (ref -3–3)
B.E.: -3.7 MMOL/L (ref -3–3)
B.E.: -4.9 MMOL/L (ref -3–3)
BLOOD BANK DISPENSE STATUS: NORMAL
BLOOD BANK SAMPLE EXPIRATION: NORMAL
BPU ID: NORMAL
BUN BLD-MCNC: 18 MG/DL (ref 6–20)
BUN BLD-MCNC: 19 MG/DL (ref 6–20)
BUN BLD-MCNC: 20 MG/DL (ref 6–20)
BUN BLD-MCNC: 20 MG/DL (ref 6–20)
BUN SERPL-MCNC: 18 MG/DL (ref 6–20)
CA-I BLD-SCNC: 0.95 MMOL/L (ref 1.15–1.33)
CA-I BLD-SCNC: 0.97 MMOL/L (ref 1.15–1.33)
CA-I BLD-SCNC: 1.01 MMOL/L (ref 1.15–1.33)
CA-I BLD-SCNC: 1.2 MMOL/L (ref 1.15–1.33)
CA-I BLD-SCNC: 1.41 MMOL/L (ref 1.15–1.33)
CALCIUM SERPL-MCNC: 8.2 MG/DL (ref 8.6–10.2)
CHLORIDE BLD-SCNC: 101 MMOL/L (ref 100–108)
CHLORIDE BLD-SCNC: 102 MMOL/L (ref 100–108)
CHLORIDE BLD-SCNC: 104 MMOL/L (ref 100–108)
CHLORIDE BLD-SCNC: 96 MMOL/L (ref 100–108)
CHLORIDE SERPL-SCNC: 106 MMOL/L (ref 98–107)
CO2 BLD CALC-SCNC: 23 MMOL/L (ref 22–29)
CO2 BLD CALC-SCNC: 24 MMOL/L (ref 22–29)
CO2 BLD CALC-SCNC: 25 MMOL/L (ref 22–29)
CO2 BLD CALC-SCNC: 25 MMOL/L (ref 22–29)
CO2 SERPL-SCNC: 21 MMOL/L (ref 22–29)
COHB: 0.9 % (ref 0–1.5)
COHB: 0.9 % (ref 0–1.5)
COHB: 1.5 % (ref 0–1.5)
COMPONENT: NORMAL
CREAT BLD-MCNC: 0.7 MG/DL (ref 0.7–1.2)
CREAT BLD-MCNC: 0.9 MG/DL (ref 0.7–1.2)
CREAT BLD-MCNC: 1.1 MG/DL (ref 0.7–1.2)
CREAT BLD-MCNC: 1.1 MG/DL (ref 0.7–1.2)
CREAT SERPL-MCNC: 1 MG/DL (ref 0.7–1.2)
CRITICAL: ABNORMAL
CROSSMATCH RESULT: NORMAL
DATE ANALYZED: ABNORMAL
DATE OF COLLECTION: ABNORMAL
EGFR, POC: 80 ML/MIN/1.73M2
EGFR, POC: 80 ML/MIN/1.73M2
EGFR, POC: >90 ML/MIN/1.73M2
EGFR, POC: >90 ML/MIN/1.73M2
ERYTHROCYTE [DISTWIDTH] IN BLOOD BY AUTOMATED COUNT: 13.8 % (ref 11.5–15)
FIO2: 50 %
FIO2: 50 %
GFR, ESTIMATED: >90 ML/MIN/1.73M2
GLUCOSE BLD-MCNC: 106 MG/DL (ref 74–99)
GLUCOSE BLD-MCNC: 115 MG/DL (ref 74–99)
GLUCOSE BLD-MCNC: 130 MG/DL (ref 74–99)
GLUCOSE BLD-MCNC: 141 MG/DL (ref 74–99)
GLUCOSE BLD-MCNC: 142 MG/DL (ref 74–99)
GLUCOSE BLD-MCNC: 157 MG/DL (ref 74–99)
GLUCOSE BLD-MCNC: 177 MG/DL (ref 74–99)
GLUCOSE BLD-MCNC: 190 MG/DL (ref 74–99)
GLUCOSE BLD-MCNC: 191 MG/DL (ref 74–99)
GLUCOSE SERPL-MCNC: 140 MG/DL (ref 74–99)
HCO3: 20.4 MMOL/L (ref 22–26)
HCO3: 22 MMOL/L (ref 22–26)
HCO3: 22.2 MMOL/L (ref 22–26)
HCT VFR BLD AUTO: 22 % (ref 37–54)
HCT VFR BLD AUTO: 23 % (ref 37–54)
HCT VFR BLD AUTO: 24 % (ref 37–54)
HCT VFR BLD AUTO: 25 % (ref 37–54)
HCT VFR BLD AUTO: 30.3 % (ref 37–54)
HGB BLD-MCNC: 10 G/DL (ref 12.5–16.5)
HHB: 0.7 % (ref 0–5)
HHB: 0.8 % (ref 0–5)
HHB: 1.1 % (ref 0–5)
INR PPP: 1.3
LAB: ABNORMAL
Lab: 1126
Lab: 1505
Lab: 1715
MAGNESIUM SERPL-MCNC: 3.3 MG/DL (ref 1.6–2.6)
MCH RBC QN AUTO: 29.2 PG (ref 26–35)
MCHC RBC AUTO-ENTMCNC: 33 G/DL (ref 32–34.5)
MCV RBC AUTO: 88.3 FL (ref 80–99.9)
METHB: 0.3 % (ref 0–1.5)
METHB: 0.3 % (ref 0–1.5)
METHB: 0.6 % (ref 0–1.5)
MODE: ABNORMAL
MODE: ABNORMAL
MODE: AC
NEGATIVE BASE EXCESS, ART: 0.4 MMOL/L
NEGATIVE BASE EXCESS, ART: 0.8 MMOL/L
NEGATIVE BASE EXCESS, ART: 1.3 MMOL/L
O2 SATURATION: 98.9 % (ref 92–98.5)
O2 SATURATION: 99.2 % (ref 92–98.5)
O2 SATURATION: 99.3 % (ref 92–98.5)
O2HB: 97.1 % (ref 94–97)
O2HB: 97.7 % (ref 94–97)
O2HB: 98.1 % (ref 94–97)
OPERATOR ID: 1210
OPERATOR ID: 5100
OPERATOR ID: 5123
PARTIAL THROMBOPLASTIN TIME: 33.7 SEC (ref 24.5–35.1)
PATIENT TEMP: 37 C
PCO2: 37.3 MMHG (ref 35–45)
PCO2: 38.6 MMHG (ref 35–45)
PCO2: 43.3 MMHG (ref 35–45)
PEEP/CPAP: 8 CMH2O
PEEP/CPAP: 8 CMH2O
PFO2: 2.99 MMHG/%
PFO2: 3.66 MMHG/%
PH BLOOD GAS: 7.33 (ref 7.35–7.45)
PH BLOOD GAS: 7.34 (ref 7.35–7.45)
PH BLOOD GAS: 7.39 (ref 7.35–7.45)
PLATELET # BLD AUTO: 117 K/UL (ref 130–450)
PMV BLD AUTO: 11.3 FL (ref 7–12)
PO2: 145.3 MMHG (ref 75–100)
PO2: 149.3 MMHG (ref 75–100)
PO2: 183.2 MMHG (ref 75–100)
POC ANION GAP: 10 MMOL/L (ref 7–16)
POC ANION GAP: 7 MMOL/L (ref 7–16)
POC ANION GAP: 7 MMOL/L (ref 7–16)
POC ANION GAP: 8 MMOL/L (ref 7–16)
POC HCO3: 23.4 MMOL/L (ref 22–26)
POC HCO3: 23.8 MMOL/L (ref 22–26)
POC HCO3: 25 MMOL/L (ref 22–26)
POC HCO3: 25.4 MMOL/L (ref 22–26)
POC HEMOGLOBIN (CALC): 7.6 G/DL (ref 12.5–15.5)
POC HEMOGLOBIN (CALC): 7.8 G/DL (ref 12.5–15.5)
POC HEMOGLOBIN (CALC): 8.2 G/DL (ref 12.5–15.5)
POC HEMOGLOBIN (CALC): 8.6 G/DL (ref 12.5–15.5)
POC LACTIC ACID: 0.7 MMOL/L (ref 0.5–2.2)
POC LACTIC ACID: 1.5 MMOL/L (ref 0.5–2.2)
POC LACTIC ACID: 1.7 MMOL/L (ref 0.5–2.2)
POC LACTIC ACID: <0.3 MMOL/L (ref 0.5–2.2)
POC O2 SATURATION: 100 % (ref 92–98.5)
POC O2 SATURATION: 99.6 % (ref 92–98.5)
POC O2 SATURATION: ABNORMAL % (ref 92–98.5)
POC O2 SATURATION: ABNORMAL % (ref 92–98.5)
POC PCO2: 35.7 MM HG (ref 35–45)
POC PCO2: 37.7 MM HG (ref 35–45)
POC PCO2: 37.8 MM HG (ref 35–45)
POC PCO2: 43 MM HG (ref 35–45)
POC PH: 7.37 (ref 7.35–7.45)
POC PH: 7.4 (ref 7.35–7.45)
POC PH: 7.41 (ref 7.35–7.45)
POC PH: 7.46 (ref 7.35–7.45)
POC PO2: 183.9 MM HG (ref 80–100)
POC PO2: 487 MM HG (ref 80–100)
POC PO2: >500 MM HG (ref 80–100)
POC PO2: >500 MM HG (ref 80–100)
POSITIVE BASE EXCESS, ART: 1.6 MMOL/L (ref 0–3)
POTASSIUM BLD-SCNC: 4.6 MMOL/L (ref 3.5–5)
POTASSIUM BLD-SCNC: 4.6 MMOL/L (ref 3.5–5)
POTASSIUM BLD-SCNC: 5.1 MMOL/L (ref 3.5–5)
POTASSIUM BLD-SCNC: 5.5 MMOL/L (ref 3.5–5)
POTASSIUM SERPL-SCNC: 4.07 MMOL/L (ref 3.5–5)
POTASSIUM SERPL-SCNC: 4.4 MMOL/L (ref 3.5–5)
POTASSIUM SERPL-SCNC: 4.7 MMOL/L (ref 3.5–5)
PROTHROMBIN TIME: 14.5 SEC (ref 9.3–12.4)
PS: 8 CMH20
RBC # BLD AUTO: 3.43 M/UL (ref 3.8–5.8)
RI(T): 0.68
RI(T): 1.11
RR MECHANICAL: 14 B/MIN
SODIUM BLD-SCNC: 129 MMOL/L (ref 132–146)
SODIUM BLD-SCNC: 134 MMOL/L (ref 132–146)
SODIUM BLD-SCNC: 134 MMOL/L (ref 132–146)
SODIUM BLD-SCNC: 135 MMOL/L (ref 132–146)
SODIUM SERPL-SCNC: 137 MMOL/L (ref 132–146)
SOURCE, BLOOD GAS: ABNORMAL
THB: 11.2 G/DL (ref 11.5–16.5)
THB: 11.5 G/DL (ref 11.5–16.5)
THB: 11.7 G/DL (ref 11.5–16.5)
TIME ANALYZED: 1132
TIME ANALYZED: 1515
TIME ANALYZED: 1721
TRANSFUSION STATUS: NORMAL
UNIT DIVISION: 0
VT MECHANICAL: 500 ML
WBC OTHER # BLD: 20 K/UL (ref 4.5–11.5)

## 2024-07-19 PROCEDURE — 6360000002 HC RX W HCPCS: Performed by: NURSE PRACTITIONER

## 2024-07-19 PROCEDURE — 2709999900 HC NON-CHARGEABLE SUPPLY: Performed by: THORACIC SURGERY (CARDIOTHORACIC VASCULAR SURGERY)

## 2024-07-19 PROCEDURE — 33518 CABG ARTERY-VEIN TWO: CPT | Performed by: STUDENT IN AN ORGANIZED HEALTH CARE EDUCATION/TRAINING PROGRAM

## 2024-07-19 PROCEDURE — 33427 REPAIR OF MITRAL VALVE: CPT | Performed by: THORACIC SURGERY (CARDIOTHORACIC VASCULAR SURGERY)

## 2024-07-19 PROCEDURE — 3600000018 HC SURGERY OHS ADDTL 15MIN: Performed by: THORACIC SURGERY (CARDIOTHORACIC VASCULAR SURGERY)

## 2024-07-19 PROCEDURE — P9041 ALBUMIN (HUMAN),5%, 50ML: HCPCS | Performed by: NURSE ANESTHETIST, CERTIFIED REGISTERED

## 2024-07-19 PROCEDURE — 2500000003 HC RX 250 WO HCPCS

## 2024-07-19 PROCEDURE — 94669 MECHANICAL CHEST WALL OSCILL: CPT

## 2024-07-19 PROCEDURE — 06BP4ZZ EXCISION OF RIGHT SAPHENOUS VEIN, PERCUTANEOUS ENDOSCOPIC APPROACH: ICD-10-PCS | Performed by: THORACIC SURGERY (CARDIOTHORACIC VASCULAR SURGERY)

## 2024-07-19 PROCEDURE — 80048 BASIC METABOLIC PNL TOTAL CA: CPT

## 2024-07-19 PROCEDURE — 33464 VALVULOPLASTY TRICUSPID: CPT | Performed by: THORACIC SURGERY (CARDIOTHORACIC VASCULAR SURGERY)

## 2024-07-19 PROCEDURE — 85347 COAGULATION TIME ACTIVATED: CPT

## 2024-07-19 PROCEDURE — 0PH000Z INSERTION OF RIGID PLATE INTERNAL FIXATION DEVICE INTO STERNUM, OPEN APPROACH: ICD-10-PCS | Performed by: THORACIC SURGERY (CARDIOTHORACIC VASCULAR SURGERY)

## 2024-07-19 PROCEDURE — 2720000010 HC SURG SUPPLY STERILE: Performed by: THORACIC SURGERY (CARDIOTHORACIC VASCULAR SURGERY)

## 2024-07-19 PROCEDURE — 33508 ENDOSCOPIC VEIN HARVEST: CPT | Performed by: THORACIC SURGERY (CARDIOTHORACIC VASCULAR SURGERY)

## 2024-07-19 PROCEDURE — 6360000002 HC RX W HCPCS: Performed by: PHYSICIAN ASSISTANT

## 2024-07-19 PROCEDURE — 33518 CABG ARTERY-VEIN TWO: CPT | Performed by: THORACIC SURGERY (CARDIOTHORACIC VASCULAR SURGERY)

## 2024-07-19 PROCEDURE — 2000000000 HC ICU R&B

## 2024-07-19 PROCEDURE — 94150 VITAL CAPACITY TEST: CPT

## 2024-07-19 PROCEDURE — 02L70CK OCCLUSION OF LEFT ATRIAL APPENDAGE WITH EXTRALUMINAL DEVICE, OPEN APPROACH: ICD-10-PCS | Performed by: THORACIC SURGERY (CARDIOTHORACIC VASCULAR SURGERY)

## 2024-07-19 PROCEDURE — 30233N1 TRANSFUSION OF NONAUTOLOGOUS RED BLOOD CELLS INTO PERIPHERAL VEIN, PERCUTANEOUS APPROACH: ICD-10-PCS | Performed by: THORACIC SURGERY (CARDIOTHORACIC VASCULAR SURGERY)

## 2024-07-19 PROCEDURE — C1713 ANCHOR/SCREW BN/BN,TIS/BN: HCPCS | Performed by: THORACIC SURGERY (CARDIOTHORACIC VASCULAR SURGERY)

## 2024-07-19 PROCEDURE — 02VG0ZZ RESTRICTION OF MITRAL VALVE, OPEN APPROACH: ICD-10-PCS | Performed by: THORACIC SURGERY (CARDIOTHORACIC VASCULAR SURGERY)

## 2024-07-19 PROCEDURE — 83735 ASSAY OF MAGNESIUM: CPT

## 2024-07-19 PROCEDURE — C1729 CATH, DRAINAGE: HCPCS | Performed by: THORACIC SURGERY (CARDIOTHORACIC VASCULAR SURGERY)

## 2024-07-19 PROCEDURE — 6370000000 HC RX 637 (ALT 250 FOR IP): Performed by: PHYSICIAN ASSISTANT

## 2024-07-19 PROCEDURE — 71045 X-RAY EXAM CHEST 1 VIEW: CPT

## 2024-07-19 PROCEDURE — 99233 SBSQ HOSP IP/OBS HIGH 50: CPT | Performed by: INTERNAL MEDICINE

## 2024-07-19 PROCEDURE — 84132 ASSAY OF SERUM POTASSIUM: CPT

## 2024-07-19 PROCEDURE — 02UG0JZ SUPPLEMENT MITRAL VALVE WITH SYNTHETIC SUBSTITUTE, OPEN APPROACH: ICD-10-PCS | Performed by: THORACIC SURGERY (CARDIOTHORACIC VASCULAR SURGERY)

## 2024-07-19 PROCEDURE — 2580000003 HC RX 258: Performed by: PHYSICIAN ASSISTANT

## 2024-07-19 PROCEDURE — 33427 REPAIR OF MITRAL VALVE: CPT | Performed by: STUDENT IN AN ORGANIZED HEALTH CARE EDUCATION/TRAINING PROGRAM

## 2024-07-19 PROCEDURE — 3600000008 HC SURGERY OHS BASE: Performed by: THORACIC SURGERY (CARDIOTHORACIC VASCULAR SURGERY)

## 2024-07-19 PROCEDURE — 33533 CABG ARTERIAL SINGLE: CPT | Performed by: THORACIC SURGERY (CARDIOTHORACIC VASCULAR SURGERY)

## 2024-07-19 PROCEDURE — 02UJ0JZ SUPPLEMENT TRICUSPID VALVE WITH SYNTHETIC SUBSTITUTE, OPEN APPROACH: ICD-10-PCS | Performed by: THORACIC SURGERY (CARDIOTHORACIC VASCULAR SURGERY)

## 2024-07-19 PROCEDURE — 3700000001 HC ADD 15 MINUTES (ANESTHESIA): Performed by: THORACIC SURGERY (CARDIOTHORACIC VASCULAR SURGERY)

## 2024-07-19 PROCEDURE — 2580000003 HC RX 258: Performed by: THORACIC SURGERY (CARDIOTHORACIC VASCULAR SURGERY)

## 2024-07-19 PROCEDURE — APPSS60 APP SPLIT SHARED TIME 46-60 MINUTES: Performed by: NURSE PRACTITIONER

## 2024-07-19 PROCEDURE — 82803 BLOOD GASES ANY COMBINATION: CPT

## 2024-07-19 PROCEDURE — 94002 VENT MGMT INPAT INIT DAY: CPT

## 2024-07-19 PROCEDURE — 82805 BLOOD GASES W/O2 SATURATION: CPT

## 2024-07-19 PROCEDURE — 6360000002 HC RX W HCPCS: Performed by: NURSE ANESTHETIST, CERTIFIED REGISTERED

## 2024-07-19 PROCEDURE — 85730 THROMBOPLASTIN TIME PARTIAL: CPT

## 2024-07-19 PROCEDURE — A4217 STERILE WATER/SALINE, 500 ML: HCPCS | Performed by: THORACIC SURGERY (CARDIOTHORACIC VASCULAR SURGERY)

## 2024-07-19 PROCEDURE — 85610 PROTHROMBIN TIME: CPT

## 2024-07-19 PROCEDURE — 6360000002 HC RX W HCPCS

## 2024-07-19 PROCEDURE — 83605 ASSAY OF LACTIC ACID: CPT

## 2024-07-19 PROCEDURE — 82330 ASSAY OF CALCIUM: CPT

## 2024-07-19 PROCEDURE — 85027 COMPLETE CBC AUTOMATED: CPT

## 2024-07-19 PROCEDURE — 6360000002 HC RX W HCPCS: Performed by: THORACIC SURGERY (CARDIOTHORACIC VASCULAR SURGERY)

## 2024-07-19 PROCEDURE — 33464 VALVULOPLASTY TRICUSPID: CPT | Performed by: STUDENT IN AN ORGANIZED HEALTH CARE EDUCATION/TRAINING PROGRAM

## 2024-07-19 PROCEDURE — 3700000000 HC ANESTHESIA ATTENDED CARE: Performed by: THORACIC SURGERY (CARDIOTHORACIC VASCULAR SURGERY)

## 2024-07-19 PROCEDURE — 33533 CABG ARTERIAL SINGLE: CPT | Performed by: STUDENT IN AN ORGANIZED HEALTH CARE EDUCATION/TRAINING PROGRAM

## 2024-07-19 PROCEDURE — 02100Z9 BYPASS CORONARY ARTERY, ONE ARTERY FROM LEFT INTERNAL MAMMARY, OPEN APPROACH: ICD-10-PCS | Performed by: THORACIC SURGERY (CARDIOTHORACIC VASCULAR SURGERY)

## 2024-07-19 PROCEDURE — 2500000003 HC RX 250 WO HCPCS: Performed by: PHYSICIAN ASSISTANT

## 2024-07-19 PROCEDURE — 2580000003 HC RX 258

## 2024-07-19 PROCEDURE — 82962 GLUCOSE BLOOD TEST: CPT

## 2024-07-19 PROCEDURE — 85014 HEMATOCRIT: CPT

## 2024-07-19 PROCEDURE — 7100000000 HC PACU RECOVERY - FIRST 15 MIN

## 2024-07-19 PROCEDURE — B24BZZ4 ULTRASONOGRAPHY OF HEART WITH AORTA, TRANSESOPHAGEAL: ICD-10-PCS | Performed by: THORACIC SURGERY (CARDIOTHORACIC VASCULAR SURGERY)

## 2024-07-19 PROCEDURE — 80047 BASIC METABLC PNL IONIZED CA: CPT

## 2024-07-19 PROCEDURE — 021109W BYPASS CORONARY ARTERY, TWO ARTERIES FROM AORTA WITH AUTOLOGOUS VENOUS TISSUE, OPEN APPROACH: ICD-10-PCS | Performed by: THORACIC SURGERY (CARDIOTHORACIC VASCULAR SURGERY)

## 2024-07-19 PROCEDURE — 94640 AIRWAY INHALATION TREATMENT: CPT

## 2024-07-19 PROCEDURE — 2500000003 HC RX 250 WO HCPCS: Performed by: NURSE ANESTHETIST, CERTIFIED REGISTERED

## 2024-07-19 PROCEDURE — 2700000000 HC OXYGEN THERAPY PER DAY

## 2024-07-19 PROCEDURE — 5A1221Z PERFORMANCE OF CARDIAC OUTPUT, CONTINUOUS: ICD-10-PCS | Performed by: THORACIC SURGERY (CARDIOTHORACIC VASCULAR SURGERY)

## 2024-07-19 PROCEDURE — 37799 UNLISTED PX VASCULAR SURGERY: CPT

## 2024-07-19 PROCEDURE — C1889 IMPLANT/INSERT DEVICE, NOC: HCPCS | Performed by: THORACIC SURGERY (CARDIOTHORACIC VASCULAR SURGERY)

## 2024-07-19 PROCEDURE — A4648 IMPLANTABLE TISSUE MARKER: HCPCS | Performed by: THORACIC SURGERY (CARDIOTHORACIC VASCULAR SURGERY)

## 2024-07-19 PROCEDURE — 7100000001 HC PACU RECOVERY - ADDTL 15 MIN

## 2024-07-19 DEVICE — BAND 638BL30 CG FUTURE ANULO 29L
Type: IMPLANTABLE DEVICE | Site: MITRAL VALVE | Status: FUNCTIONAL
Brand: CG FUTURE™

## 2024-07-19 DEVICE — DEVICE OCCL CLP L45MM PLUNG GRP FLX SHFT FOR GILLINOV: Type: IMPLANTABLE DEVICE | Site: HEART | Status: FUNCTIONAL

## 2024-07-19 DEVICE — BAND 900SFC228 TRI-AD2.0 ADAMS TRICUSPID
Type: IMPLANTABLE DEVICE | Site: TRIANSCUSPID VALVE | Status: FUNCTIONAL
Brand: TRI-AD™

## 2024-07-19 DEVICE — PLATE LCK STRNL 8-H LAD T 1.8MM: Type: IMPLANTABLE DEVICE | Site: STERNUM | Status: FUNCTIONAL

## 2024-07-19 DEVICE — SCREW BNE L13MM DIA2.3MM THOR STRNL TI LOK DRL FREE LEV 1: Type: IMPLANTABLE DEVICE | Site: STERNUM | Status: FUNCTIONAL

## 2024-07-19 DEVICE — PLATE BONE 1.8MM THICKNESS STRNL LCK 6 H CP TI: Type: IMPLANTABLE DEVICE | Site: STERNUM | Status: FUNCTIONAL

## 2024-07-19 DEVICE — SCREW BNE L15MM DIA2.3MM THOR STRNL TI LOK DRL FREE LEV 1: Type: IMPLANTABLE DEVICE | Site: STERNUM | Status: FUNCTIONAL

## 2024-07-19 RX ORDER — IBUPROFEN 400 MG/1
400 TABLET ORAL EVERY 8 HOURS
Status: DISCONTINUED | OUTPATIENT
Start: 2024-07-21 | End: 2024-07-20

## 2024-07-19 RX ORDER — ACETAMINOPHEN 650 MG/1
650 SUPPOSITORY RECTAL EVERY 4 HOURS PRN
Status: DISCONTINUED | OUTPATIENT
Start: 2024-07-19 | End: 2024-07-25 | Stop reason: HOSPADM

## 2024-07-19 RX ORDER — SODIUM CHLORIDE, SODIUM LACTATE, POTASSIUM CHLORIDE, CALCIUM CHLORIDE 600; 310; 30; 20 MG/100ML; MG/100ML; MG/100ML; MG/100ML
INJECTION, SOLUTION INTRAVENOUS CONTINUOUS PRN
Status: DISCONTINUED | OUTPATIENT
Start: 2024-07-19 | End: 2024-07-19 | Stop reason: SDUPTHER

## 2024-07-19 RX ORDER — SODIUM CHLORIDE 9 MG/ML
INJECTION, SOLUTION INTRAVENOUS PRN
Status: DISCONTINUED | OUTPATIENT
Start: 2024-07-19 | End: 2024-07-19 | Stop reason: HOSPADM

## 2024-07-19 RX ORDER — HEPARIN SODIUM 10000 [USP'U]/ML
INJECTION, SOLUTION INTRAVENOUS; SUBCUTANEOUS PRN
Status: DISCONTINUED | OUTPATIENT
Start: 2024-07-19 | End: 2024-07-19 | Stop reason: SDUPTHER

## 2024-07-19 RX ORDER — NOREPINEPHRINE BITARTRATE 0.06 MG/ML
1-100 INJECTION, SOLUTION INTRAVENOUS CONTINUOUS PRN
Status: DISCONTINUED | OUTPATIENT
Start: 2024-07-19 | End: 2024-07-20

## 2024-07-19 RX ORDER — INSULIN GLARGINE 100 [IU]/ML
0.15 INJECTION, SOLUTION SUBCUTANEOUS NIGHTLY
Status: DISCONTINUED | OUTPATIENT
Start: 2024-07-20 | End: 2024-07-20

## 2024-07-19 RX ORDER — CEFAZOLIN SODIUM 1 G/3ML
INJECTION, POWDER, FOR SOLUTION INTRAMUSCULAR; INTRAVENOUS PRN
Status: DISCONTINUED | OUTPATIENT
Start: 2024-07-19 | End: 2024-07-19 | Stop reason: SDUPTHER

## 2024-07-19 RX ORDER — PROPOFOL 10 MG/ML
INJECTION, EMULSION INTRAVENOUS
Status: DISPENSED
Start: 2024-07-19 | End: 2024-07-19

## 2024-07-19 RX ORDER — MEPERIDINE HYDROCHLORIDE 25 MG/ML
25 INJECTION INTRAMUSCULAR; INTRAVENOUS; SUBCUTANEOUS
Status: DISCONTINUED | OUTPATIENT
Start: 2024-07-19 | End: 2024-07-20

## 2024-07-19 RX ORDER — SENNA AND DOCUSATE SODIUM 50; 8.6 MG/1; MG/1
1 TABLET, FILM COATED ORAL 2 TIMES DAILY
Status: DISCONTINUED | OUTPATIENT
Start: 2024-07-19 | End: 2024-07-20

## 2024-07-19 RX ORDER — GLUCAGON 1 MG/ML
1 KIT INJECTION PRN
Status: DISCONTINUED | OUTPATIENT
Start: 2024-07-19 | End: 2024-07-20

## 2024-07-19 RX ORDER — SODIUM CHLORIDE 0.9 % (FLUSH) 0.9 %
5-40 SYRINGE (ML) INJECTION EVERY 12 HOURS SCHEDULED
Status: DISCONTINUED | OUTPATIENT
Start: 2024-07-19 | End: 2024-07-19 | Stop reason: HOSPADM

## 2024-07-19 RX ORDER — ASPIRIN 81 MG/1
81 TABLET, CHEWABLE ORAL ONCE
Status: COMPLETED | OUTPATIENT
Start: 2024-07-19 | End: 2024-07-19

## 2024-07-19 RX ORDER — ACETAMINOPHEN 325 MG/1
650 TABLET ORAL EVERY 4 HOURS PRN
Status: DISCONTINUED | OUTPATIENT
Start: 2024-07-22 | End: 2024-07-25 | Stop reason: HOSPADM

## 2024-07-19 RX ORDER — SODIUM CHLORIDE 0.9 % (FLUSH) 0.9 %
5-40 SYRINGE (ML) INJECTION PRN
Status: DISCONTINUED | OUTPATIENT
Start: 2024-07-19 | End: 2024-07-19 | Stop reason: HOSPADM

## 2024-07-19 RX ORDER — DEXTROSE MONOHYDRATE 100 MG/ML
INJECTION, SOLUTION INTRAVENOUS CONTINUOUS PRN
Status: DISCONTINUED | OUTPATIENT
Start: 2024-07-19 | End: 2024-07-20

## 2024-07-19 RX ORDER — LIDOCAINE 4 G/G
1 PATCH TOPICAL DAILY
Status: DISCONTINUED | OUTPATIENT
Start: 2024-07-19 | End: 2024-07-25 | Stop reason: HOSPADM

## 2024-07-19 RX ORDER — ASPIRIN 81 MG/1
81 TABLET ORAL DAILY
Status: DISCONTINUED | OUTPATIENT
Start: 2024-07-20 | End: 2024-07-20

## 2024-07-19 RX ORDER — OXYCODONE HYDROCHLORIDE 10 MG/1
10 TABLET ORAL EVERY 4 HOURS PRN
Status: DISCONTINUED | OUTPATIENT
Start: 2024-07-19 | End: 2024-07-20

## 2024-07-19 RX ORDER — LIDOCAINE HYDROCHLORIDE 20 MG/ML
INJECTION, SOLUTION INTRAVENOUS PRN
Status: DISCONTINUED | OUTPATIENT
Start: 2024-07-19 | End: 2024-07-19 | Stop reason: SDUPTHER

## 2024-07-19 RX ORDER — MIDAZOLAM HYDROCHLORIDE 1 MG/ML
INJECTION INTRAMUSCULAR; INTRAVENOUS PRN
Status: DISCONTINUED | OUTPATIENT
Start: 2024-07-19 | End: 2024-07-19 | Stop reason: SDUPTHER

## 2024-07-19 RX ORDER — ONDANSETRON 2 MG/ML
4 INJECTION INTRAMUSCULAR; INTRAVENOUS EVERY 6 HOURS PRN
Status: DISCONTINUED | OUTPATIENT
Start: 2024-07-19 | End: 2024-07-20

## 2024-07-19 RX ORDER — PANTOPRAZOLE SODIUM 40 MG/1
40 TABLET, DELAYED RELEASE ORAL
Status: DISCONTINUED | OUTPATIENT
Start: 2024-07-20 | End: 2024-07-25 | Stop reason: HOSPADM

## 2024-07-19 RX ORDER — LANOLIN ALCOHOL/MO/W.PET/CERES
400 CREAM (GRAM) TOPICAL 2 TIMES DAILY
Status: DISCONTINUED | OUTPATIENT
Start: 2024-07-20 | End: 2024-07-25 | Stop reason: HOSPADM

## 2024-07-19 RX ORDER — GLYCOPYRROLATE 0.2 MG/ML
INJECTION INTRAMUSCULAR; INTRAVENOUS PRN
Status: DISCONTINUED | OUTPATIENT
Start: 2024-07-19 | End: 2024-07-19 | Stop reason: SDUPTHER

## 2024-07-19 RX ORDER — NITROGLYCERIN 20 MG/100ML
5-200 INJECTION INTRAVENOUS CONTINUOUS
Status: DISCONTINUED | OUTPATIENT
Start: 2024-07-19 | End: 2024-07-20

## 2024-07-19 RX ORDER — ALBUMIN, HUMAN INJ 5% 5 %
SOLUTION INTRAVENOUS
Status: COMPLETED
Start: 2024-07-19 | End: 2024-07-20

## 2024-07-19 RX ORDER — IPRATROPIUM BROMIDE AND ALBUTEROL SULFATE 2.5; .5 MG/3ML; MG/3ML
1 SOLUTION RESPIRATORY (INHALATION)
Status: DISCONTINUED | OUTPATIENT
Start: 2024-07-19 | End: 2024-07-25 | Stop reason: HOSPADM

## 2024-07-19 RX ORDER — KETOROLAC TROMETHAMINE 30 MG/ML
15 INJECTION, SOLUTION INTRAMUSCULAR; INTRAVENOUS EVERY 6 HOURS
Status: DISCONTINUED | OUTPATIENT
Start: 2024-07-19 | End: 2024-07-20

## 2024-07-19 RX ORDER — ALBUMIN, HUMAN INJ 5% 5 %
SOLUTION INTRAVENOUS PRN
Status: DISCONTINUED | OUTPATIENT
Start: 2024-07-19 | End: 2024-07-19 | Stop reason: SDUPTHER

## 2024-07-19 RX ORDER — AMINOCAPROIC ACID 250 MG/ML
INJECTION, SOLUTION INTRAVENOUS PRN
Status: DISCONTINUED | OUTPATIENT
Start: 2024-07-19 | End: 2024-07-19 | Stop reason: SDUPTHER

## 2024-07-19 RX ORDER — BISACODYL 10 MG
10 SUPPOSITORY, RECTAL RECTAL DAILY PRN
Status: DISCONTINUED | OUTPATIENT
Start: 2024-07-20 | End: 2024-07-20

## 2024-07-19 RX ORDER — NITROGLYCERIN 5 MG/ML
INJECTION, SOLUTION INTRAVENOUS PRN
Status: DISCONTINUED | OUTPATIENT
Start: 2024-07-19 | End: 2024-07-19 | Stop reason: SDUPTHER

## 2024-07-19 RX ORDER — SODIUM CHLORIDE 9 MG/ML
INJECTION, SOLUTION INTRAVENOUS PRN
Status: DISCONTINUED | OUTPATIENT
Start: 2024-07-19 | End: 2024-07-20

## 2024-07-19 RX ORDER — PROPOFOL 10 MG/ML
INJECTION, EMULSION INTRAVENOUS PRN
Status: DISCONTINUED | OUTPATIENT
Start: 2024-07-19 | End: 2024-07-19 | Stop reason: SDUPTHER

## 2024-07-19 RX ORDER — SODIUM CHLORIDE 9 MG/ML
INJECTION, SOLUTION INTRAVENOUS CONTINUOUS PRN
Status: DISCONTINUED | OUTPATIENT
Start: 2024-07-19 | End: 2024-07-19 | Stop reason: SDUPTHER

## 2024-07-19 RX ORDER — SODIUM CHLORIDE 0.9 % (FLUSH) 0.9 %
5-40 SYRINGE (ML) INJECTION PRN
Status: DISCONTINUED | OUTPATIENT
Start: 2024-07-19 | End: 2024-07-20

## 2024-07-19 RX ORDER — CALCIUM CHLORIDE 100 MG/ML
INJECTION INTRAVENOUS; INTRAVENTRICULAR PRN
Status: DISCONTINUED | OUTPATIENT
Start: 2024-07-19 | End: 2024-07-19 | Stop reason: SDUPTHER

## 2024-07-19 RX ORDER — SODIUM CHLORIDE 9 MG/ML
INJECTION, SOLUTION INTRAVENOUS CONTINUOUS
Status: DISCONTINUED | OUTPATIENT
Start: 2024-07-19 | End: 2024-07-20

## 2024-07-19 RX ORDER — TAMSULOSIN HYDROCHLORIDE 0.4 MG/1
0.4 CAPSULE ORAL DAILY
Status: DISCONTINUED | OUTPATIENT
Start: 2024-07-20 | End: 2024-07-22

## 2024-07-19 RX ORDER — INSULIN LISPRO 100 [IU]/ML
0-16 INJECTION, SOLUTION INTRAVENOUS; SUBCUTANEOUS EVERY 4 HOURS
Status: DISCONTINUED | OUTPATIENT
Start: 2024-07-19 | End: 2024-07-20

## 2024-07-19 RX ORDER — POTASSIUM CHLORIDE 29.8 MG/ML
20 INJECTION INTRAVENOUS PRN
Status: DISCONTINUED | OUTPATIENT
Start: 2024-07-19 | End: 2024-07-20

## 2024-07-19 RX ORDER — POLYETHYLENE GLYCOL 3350 17 G/17G
17 POWDER, FOR SOLUTION ORAL DAILY
Status: DISCONTINUED | OUTPATIENT
Start: 2024-07-20 | End: 2024-07-20

## 2024-07-19 RX ORDER — EPHEDRINE SULFATE/0.9% NACL/PF 25 MG/5 ML
SYRINGE (ML) INTRAVENOUS PRN
Status: DISCONTINUED | OUTPATIENT
Start: 2024-07-19 | End: 2024-07-19 | Stop reason: SDUPTHER

## 2024-07-19 RX ORDER — FENTANYL CITRATE 0.05 MG/ML
INJECTION, SOLUTION INTRAMUSCULAR; INTRAVENOUS PRN
Status: DISCONTINUED | OUTPATIENT
Start: 2024-07-19 | End: 2024-07-19 | Stop reason: SDUPTHER

## 2024-07-19 RX ORDER — PROPOFOL 10 MG/ML
10 INJECTION, EMULSION INTRAVENOUS CONTINUOUS PRN
Status: DISCONTINUED | OUTPATIENT
Start: 2024-07-19 | End: 2024-07-20

## 2024-07-19 RX ORDER — MAGNESIUM SULFATE IN WATER 40 MG/ML
2000 INJECTION, SOLUTION INTRAVENOUS PRN
Status: DISCONTINUED | OUTPATIENT
Start: 2024-07-19 | End: 2024-07-20

## 2024-07-19 RX ORDER — NITROGLYCERIN 20 MG/100ML
INJECTION INTRAVENOUS
Status: DISPENSED
Start: 2024-07-19 | End: 2024-07-19

## 2024-07-19 RX ORDER — VECURONIUM BROMIDE 1 MG/ML
INJECTION, POWDER, LYOPHILIZED, FOR SOLUTION INTRAVENOUS PRN
Status: DISCONTINUED | OUTPATIENT
Start: 2024-07-19 | End: 2024-07-19 | Stop reason: SDUPTHER

## 2024-07-19 RX ORDER — ALBUMIN, HUMAN INJ 5% 5 %
25 SOLUTION INTRAVENOUS PRN
Status: DISCONTINUED | OUTPATIENT
Start: 2024-07-19 | End: 2024-07-20

## 2024-07-19 RX ORDER — CHLORHEXIDINE GLUCONATE ORAL RINSE 1.2 MG/ML
15 SOLUTION DENTAL 2 TIMES DAILY
Status: DISCONTINUED | OUTPATIENT
Start: 2024-07-19 | End: 2024-07-20

## 2024-07-19 RX ORDER — SODIUM CHLORIDE 0.9 % (FLUSH) 0.9 %
5-40 SYRINGE (ML) INJECTION EVERY 12 HOURS SCHEDULED
Status: DISCONTINUED | OUTPATIENT
Start: 2024-07-19 | End: 2024-07-20

## 2024-07-19 RX ORDER — PROTAMINE SULFATE 10 MG/ML
INJECTION, SOLUTION INTRAVENOUS PRN
Status: DISCONTINUED | OUTPATIENT
Start: 2024-07-19 | End: 2024-07-19 | Stop reason: SDUPTHER

## 2024-07-19 RX ORDER — SODIUM CHLORIDE 9 MG/ML
INJECTION, SOLUTION INTRAVENOUS CONTINUOUS
Status: DISCONTINUED | OUTPATIENT
Start: 2024-07-19 | End: 2024-07-19 | Stop reason: HOSPADM

## 2024-07-19 RX ORDER — NEOSTIGMINE METHYLSULFATE 1 MG/ML
INJECTION, SOLUTION INTRAVENOUS PRN
Status: DISCONTINUED | OUTPATIENT
Start: 2024-07-19 | End: 2024-07-19 | Stop reason: SDUPTHER

## 2024-07-19 RX ORDER — ATORVASTATIN CALCIUM 40 MG/1
40 TABLET, FILM COATED ORAL DAILY
Status: DISCONTINUED | OUTPATIENT
Start: 2024-07-19 | End: 2024-07-25 | Stop reason: HOSPADM

## 2024-07-19 RX ORDER — 0.9 % SODIUM CHLORIDE 0.9 %
250 INTRAVENOUS SOLUTION INTRAVENOUS CONTINUOUS PRN
Status: DISCONTINUED | OUTPATIENT
Start: 2024-07-19 | End: 2024-07-20

## 2024-07-19 RX ORDER — OXYCODONE HYDROCHLORIDE 5 MG/1
5 TABLET ORAL EVERY 4 HOURS PRN
Status: DISCONTINUED | OUTPATIENT
Start: 2024-07-19 | End: 2024-07-20

## 2024-07-19 RX ORDER — BISACODYL 10 MG
10 SUPPOSITORY, RECTAL RECTAL DAILY
Status: DISCONTINUED | OUTPATIENT
Start: 2024-07-22 | End: 2024-07-20

## 2024-07-19 RX ORDER — IBUPROFEN 400 MG/1
400 TABLET ORAL EVERY 6 HOURS PRN
Status: DISCONTINUED | OUTPATIENT
Start: 2024-07-22 | End: 2024-07-20

## 2024-07-19 RX ORDER — AMIODARONE HYDROCHLORIDE 200 MG/1
400 TABLET ORAL PRN
Status: DISCONTINUED | OUTPATIENT
Start: 2024-07-19 | End: 2024-07-20

## 2024-07-19 RX ORDER — ONDANSETRON 4 MG/1
4 TABLET, ORALLY DISINTEGRATING ORAL EVERY 8 HOURS PRN
Status: DISCONTINUED | OUTPATIENT
Start: 2024-07-19 | End: 2024-07-20

## 2024-07-19 RX ORDER — LACTULOSE 10 G/15ML
20 SOLUTION ORAL 3 TIMES DAILY
Status: DISCONTINUED | OUTPATIENT
Start: 2024-07-22 | End: 2024-07-20

## 2024-07-19 RX ORDER — ACETAMINOPHEN 500 MG
1000 TABLET ORAL EVERY 6 HOURS
Status: DISCONTINUED | OUTPATIENT
Start: 2024-07-19 | End: 2024-07-21

## 2024-07-19 RX ADMIN — ASPIRIN 81 MG 81 MG: 81 TABLET ORAL at 12:14

## 2024-07-19 RX ADMIN — LIDOCAINE HYDROCHLORIDE 100 MG: 20 INJECTION, SOLUTION INTRAVENOUS at 07:02

## 2024-07-19 RX ADMIN — VECURONIUM BROMIDE 5 MG: 1 INJECTION, POWDER, LYOPHILIZED, FOR SOLUTION INTRAVENOUS at 08:33

## 2024-07-19 RX ADMIN — ACETAMINOPHEN 1000 MG: 500 TABLET ORAL at 12:14

## 2024-07-19 RX ADMIN — Medication 3 MG: at 15:44

## 2024-07-19 RX ADMIN — FENTANYL CITRATE 200 MCG: 50 INJECTION, SOLUTION INTRAMUSCULAR; INTRAVENOUS at 07:32

## 2024-07-19 RX ADMIN — IPRATROPIUM BROMIDE AND ALBUTEROL SULFATE 1 DOSE: .5; 2.5 SOLUTION RESPIRATORY (INHALATION) at 20:19

## 2024-07-19 RX ADMIN — AMINOCAPROIC ACID 5000 MG: 250 INJECTION, SOLUTION INTRAVENOUS at 07:06

## 2024-07-19 RX ADMIN — NITROGLYCERIN 25 MCG: 5 INJECTION, SOLUTION INTRAVENOUS at 08:17

## 2024-07-19 RX ADMIN — MIDAZOLAM 2 MG: 1 INJECTION INTRAMUSCULAR; INTRAVENOUS at 06:45

## 2024-07-19 RX ADMIN — PHENYLEPHRINE HYDROCHLORIDE 100 MCG: 10 INJECTION INTRAVENOUS at 08:26

## 2024-07-19 RX ADMIN — FENTANYL CITRATE 500 MCG: 50 INJECTION, SOLUTION INTRAMUSCULAR; INTRAVENOUS at 07:02

## 2024-07-19 RX ADMIN — CEFAZOLIN 2000 MG: 2 INJECTION, POWDER, FOR SOLUTION INTRAMUSCULAR; INTRAVENOUS at 07:12

## 2024-07-19 RX ADMIN — SODIUM CHLORIDE: 9 INJECTION, SOLUTION INTRAVENOUS at 06:41

## 2024-07-19 RX ADMIN — EPHEDRINE SULFATE 5 MG: 5 INJECTION INTRAVENOUS at 07:17

## 2024-07-19 RX ADMIN — GLYCOPYRROLATE 0.2 MG: 0.2 INJECTION INTRAMUSCULAR; INTRAVENOUS at 07:37

## 2024-07-19 RX ADMIN — FENTANYL CITRATE 100 MCG: 50 INJECTION, SOLUTION INTRAMUSCULAR; INTRAVENOUS at 11:12

## 2024-07-19 RX ADMIN — MIDAZOLAM 2 MG: 1 INJECTION INTRAMUSCULAR; INTRAVENOUS at 06:41

## 2024-07-19 RX ADMIN — PROPOFOL 10 MCG/KG/MIN: 10 INJECTION, EMULSION INTRAVENOUS at 12:13

## 2024-07-19 RX ADMIN — SODIUM CHLORIDE: 9 INJECTION, SOLUTION INTRAVENOUS at 11:57

## 2024-07-19 RX ADMIN — NITROGLYCERIN 25 MCG: 5 INJECTION, SOLUTION INTRAVENOUS at 08:19

## 2024-07-19 RX ADMIN — PHENYLEPHRINE HYDROCHLORIDE 100 MCG: 10 INJECTION INTRAVENOUS at 10:42

## 2024-07-19 RX ADMIN — VECURONIUM BROMIDE 10 MG: 1 INJECTION, POWDER, LYOPHILIZED, FOR SOLUTION INTRAVENOUS at 07:02

## 2024-07-19 RX ADMIN — MIDAZOLAM 2 MG: 1 INJECTION INTRAMUSCULAR; INTRAVENOUS at 06:53

## 2024-07-19 RX ADMIN — FENTANYL CITRATE 300 MCG: 50 INJECTION, SOLUTION INTRAMUSCULAR; INTRAVENOUS at 07:40

## 2024-07-19 RX ADMIN — AMINOCAPROIC ACID 1 G/HR: 250 INJECTION, SOLUTION INTRAVENOUS at 07:11

## 2024-07-19 RX ADMIN — INSULIN LISPRO 4 UNITS: 100 INJECTION, SOLUTION INTRAVENOUS; SUBCUTANEOUS at 20:39

## 2024-07-19 RX ADMIN — DEXMEDETOMIDINE HYDROCHLORIDE 0.1 MCG/KG/HR: 100 INJECTION, SOLUTION, CONCENTRATE INTRAVENOUS at 13:11

## 2024-07-19 RX ADMIN — ALBUMIN (HUMAN) 25 G: 12.5 INJECTION, SOLUTION INTRAVENOUS at 10:46

## 2024-07-19 RX ADMIN — SODIUM CHLORIDE, POTASSIUM CHLORIDE, SODIUM LACTATE AND CALCIUM CHLORIDE: 600; 310; 30; 20 INJECTION, SOLUTION INTRAVENOUS at 07:06

## 2024-07-19 RX ADMIN — PHENYLEPHRINE HYDROCHLORIDE 100 MCG: 10 INJECTION INTRAVENOUS at 10:28

## 2024-07-19 RX ADMIN — VECURONIUM BROMIDE 5 MG: 1 INJECTION, POWDER, LYOPHILIZED, FOR SOLUTION INTRAVENOUS at 07:43

## 2024-07-19 RX ADMIN — PHENYLEPHRINE HYDROCHLORIDE 100 MCG: 10 INJECTION INTRAVENOUS at 07:13

## 2024-07-19 RX ADMIN — PROPOFOL 50 MG: 10 INJECTION, EMULSION INTRAVENOUS at 07:44

## 2024-07-19 RX ADMIN — EPHEDRINE SULFATE 5 MG: 5 INJECTION INTRAVENOUS at 07:20

## 2024-07-19 RX ADMIN — SODIUM CHLORIDE, SODIUM LACTATE, POTASSIUM CHLORIDE, CALCIUM CHLORIDE: 600; 310; 30; 20 INJECTION, SOLUTION INTRAVENOUS at 07:00

## 2024-07-19 RX ADMIN — ONDANSETRON 4 MG: 2 INJECTION INTRAMUSCULAR; INTRAVENOUS at 16:22

## 2024-07-19 RX ADMIN — MIDAZOLAM 2 MG: 1 INJECTION INTRAMUSCULAR; INTRAVENOUS at 11:21

## 2024-07-19 RX ADMIN — KETOROLAC TROMETHAMINE 15 MG: 30 INJECTION, SOLUTION INTRAMUSCULAR at 12:42

## 2024-07-19 RX ADMIN — FENTANYL CITRATE 250 MCG: 50 INJECTION, SOLUTION INTRAMUSCULAR; INTRAVENOUS at 08:19

## 2024-07-19 RX ADMIN — PHENYLEPHRINE HYDROCHLORIDE 300 MCG: 10 INJECTION INTRAVENOUS at 08:27

## 2024-07-19 RX ADMIN — IPRATROPIUM BROMIDE AND ALBUTEROL SULFATE 1 DOSE: .5; 2.5 SOLUTION RESPIRATORY (INHALATION) at 13:06

## 2024-07-19 RX ADMIN — CALCIUM CHLORIDE INJECTION 0.5 G: 100 INJECTION, SOLUTION INTRAVENOUS at 10:39

## 2024-07-19 RX ADMIN — HYDROMORPHONE HYDROCHLORIDE 0.25 MG: 1 INJECTION, SOLUTION INTRAMUSCULAR; INTRAVENOUS; SUBCUTANEOUS at 20:28

## 2024-07-19 RX ADMIN — SODIUM CHLORIDE, PRESERVATIVE FREE 10 ML: 5 INJECTION INTRAVENOUS at 20:40

## 2024-07-19 RX ADMIN — PANTOPRAZOLE SODIUM 40 MG: 40 INJECTION, POWDER, FOR SOLUTION INTRAVENOUS at 11:57

## 2024-07-19 RX ADMIN — PROPOFOL 100 MG: 10 INJECTION, EMULSION INTRAVENOUS at 07:02

## 2024-07-19 RX ADMIN — SODIUM CHLORIDE: 9 INJECTION, SOLUTION INTRAVENOUS at 05:35

## 2024-07-19 RX ADMIN — CALCIUM CHLORIDE INJECTION 0.5 G: 100 INJECTION, SOLUTION INTRAVENOUS at 10:42

## 2024-07-19 RX ADMIN — IPRATROPIUM BROMIDE AND ALBUTEROL SULFATE 1 DOSE: .5; 2.5 SOLUTION RESPIRATORY (INHALATION) at 15:07

## 2024-07-19 RX ADMIN — MUPIROCIN: 20 OINTMENT TOPICAL at 20:39

## 2024-07-19 RX ADMIN — KETOROLAC TROMETHAMINE 15 MG: 30 INJECTION, SOLUTION INTRAMUSCULAR at 19:05

## 2024-07-19 RX ADMIN — PROTAMINE SULFATE 250 MG: 10 INJECTION, SOLUTION INTRAVENOUS at 10:28

## 2024-07-19 RX ADMIN — 0.12% CHLORHEXIDINE GLUCONATE 15 ML: 1.2 RINSE ORAL at 20:39

## 2024-07-19 RX ADMIN — GLYCOPYRROLATE 0.6 MG: 0.2 INJECTION INTRAMUSCULAR; INTRAVENOUS at 15:44

## 2024-07-19 RX ADMIN — NITROGLYCERIN 5 MCG/MIN: 20 INJECTION INTRAVENOUS at 11:46

## 2024-07-19 RX ADMIN — HEPARIN SODIUM 30000 UNITS: 10000 INJECTION INTRAVENOUS; SUBCUTANEOUS at 08:10

## 2024-07-19 RX ADMIN — PROPOFOL 50 MG: 10 INJECTION, EMULSION INTRAVENOUS at 07:39

## 2024-07-19 RX ADMIN — WATER 2000 MG: 1 INJECTION INTRAMUSCULAR; INTRAVENOUS; SUBCUTANEOUS at 19:06

## 2024-07-19 RX ADMIN — SODIUM CHLORIDE, SODIUM LACTATE, POTASSIUM CHLORIDE, CALCIUM CHLORIDE: 600; 310; 30; 20 INJECTION, SOLUTION INTRAVENOUS at 08:34

## 2024-07-19 RX ADMIN — CEFAZOLIN 2 G: 1 INJECTION, POWDER, FOR SOLUTION INTRAMUSCULAR; INTRAVENOUS at 10:41

## 2024-07-19 RX ADMIN — FENTANYL CITRATE 150 MCG: 50 INJECTION, SOLUTION INTRAMUSCULAR; INTRAVENOUS at 10:54

## 2024-07-19 RX ADMIN — ONDANSETRON 4 MG: 2 INJECTION INTRAMUSCULAR; INTRAVENOUS at 22:20

## 2024-07-19 ASSESSMENT — PULMONARY FUNCTION TESTS
PIF_VALUE: 23
PIF_VALUE: 24
PIF_VALUE: 26
PIF_VALUE: 16
PIF_VALUE: 23
PIF_VALUE: 26
PIF_VALUE: 24
PIF_VALUE: 17
PIF_VALUE: 24
PIF_VALUE: 23
PIF_VALUE: 25
PIF_VALUE: 24
PIF_VALUE: 26
PIF_VALUE: 25
PIF_VALUE: 24
PIF_VALUE: 16

## 2024-07-19 ASSESSMENT — PAIN SCALES - GENERAL
PAINLEVEL_OUTOF10: 6
PAINLEVEL_OUTOF10: 10
PAINLEVEL_OUTOF10: 7
PAINLEVEL_OUTOF10: 6
PAINLEVEL_OUTOF10: 10
PAINLEVEL_OUTOF10: 6

## 2024-07-19 ASSESSMENT — PAIN DESCRIPTION - DESCRIPTORS
DESCRIPTORS: ACHING;DULL;DISCOMFORT
DESCRIPTORS: ACHING;SORE

## 2024-07-19 ASSESSMENT — PAIN DESCRIPTION - LOCATION: LOCATION: STERNUM

## 2024-07-19 ASSESSMENT — PAIN - FUNCTIONAL ASSESSMENT
PAIN_FUNCTIONAL_ASSESSMENT: 0-10
PAIN_FUNCTIONAL_ASSESSMENT: PREVENTS OR INTERFERES SOME ACTIVE ACTIVITIES AND ADLS

## 2024-07-19 ASSESSMENT — LIFESTYLE VARIABLES: SMOKING_STATUS: 1

## 2024-07-19 ASSESSMENT — ENCOUNTER SYMPTOMS: SHORTNESS OF BREATH: 1

## 2024-07-19 ASSESSMENT — PAIN DESCRIPTION - ORIENTATION: ORIENTATION: RIGHT;LEFT

## 2024-07-19 ASSESSMENT — PAIN DESCRIPTION - FREQUENCY: FREQUENCY: CONTINUOUS

## 2024-07-19 ASSESSMENT — PAIN DESCRIPTION - PAIN TYPE: TYPE: ACUTE PAIN;SURGICAL PAIN

## 2024-07-19 ASSESSMENT — PAIN DESCRIPTION - ONSET: ONSET: ON-GOING

## 2024-07-19 NOTE — PROGRESS NOTES
Pt signed out from anesthesia. Chest tubes with minimal output. Costa with adequate drainage. Pt flipped to pressure support in hopes to extubate.    Lauren Frommelt, RN

## 2024-07-19 NOTE — CONSULTS
Inpatient Cardiology Progress note     PATIENT IS BEING FOLLOWED FOR: Cardiology follow-up post CABG, MVrp and TVrp    Binh Erazo is a 53 y.o. AAM known to Dr Mcnulty.    2024 presented today for scheduled CABG (LIMA-LAD, SVG-Diagonal and SVG-RCA) and MVrp, TVrp      SUBJECTIVE: Sedated unresponsive  OBJECTIVE: Intubated, sedated in no apparent distress     ROS: Sedated on ventilator    PHYSICAL EXAM:   /69   Pulse 70   Temp 98.2 °F (36.8 °C) (Bladder)   Resp 14   Ht 1.702 m (5' 7\")   Wt 69.9 kg (154 lb)   SpO2 100%   BMI 24.12 kg/m²    B/P Range last 24 hours: Systolic (24hrs), Av , Min:114 , Max:151    Diastolic (24hrs), Av, Min:69, Max:87    CONST: Well developed, well nourished AAM  who appears stated age. Sedated and unresponsive orally intubate don ventilator. No apparent distress  HEENT:   Head- Normocephalic, atraumatic   Eyes- Conjunctivae pink, anicteric  Throat- Oral mucosa pink and moist. OGT. Orally intubated   Neck-  No stridor, trachea midline, no jugular venous distention. YUNIEL for carotid bruit. RIJ introducer.  CHEST: Chest symmetrical and no grimacing to palpation. No accessory muscle use or intercostal retractions. MSI with MIRTHA dressing D&I. CT to suction. PAcer wires secured to box.   RESPIRATORY:  Lung sounds - clear anteriorly  CARDIOVASCULAR:     Heart Ausculation- Regular rate and rhythm, no murmur heard.   PV: No lower extremity edema. No varicosities. Pedal pulses palpable, no clubbing or cyanosis. R brachial A-line  ABDOMEN: Soft, no grimacing to light palpation. Bowel sounds present. No palpable masses; no abdominal bruit  MS: Good muscle strength and tone. No atrophy or abnormal movements.   : Costa clear yellow urine  SKIN: Warm and dry no statis dermatitis or ulcers. ACE bandage to LLE (SVG site)   NEURO / PSYCH: Sedated, unresponsive on ventilator.        Intake/Output Summary (Last 24 hours) at 2024 1149  Last data filed at 2024    Cardiovascular: Normal rate, regular rhythm, normal heart sounds and intact distal pulses.  Exam reveals no gallop and no friction rub.  No murmur heard.  Pulmonary/Chest: Effort normal and breath sounds normal. No respiratory distress. No wheezes. No rales. No tenderness.   Abdominal: Soft. Bowel sounds are normal. No distension and no mass. No tenderness. No rebound and no guarding.   Musculoskeletal: Normal range of motion. No edema and no tenderness.   Lymphadenopathy:   No cervical adenopathy.   Neurological: Alert and oriented to person, place, and time.   Skin: Skin is warm and dry. No rash noted. Not diaphoretic. No erythema.   Psychiatric: Normal mood and affect. Behavior is normal.     See accompanying documentation for full consult.    ASSESSMENT AND PLAN:  Patient Active Problem List   Diagnosis    Tobacco abuse    Essential hypertension    Abnormal EKG    LARSON (dyspnea on exertion)    LVH (left ventricular hypertrophy)    Moderate tricuspid valve regurgitation    Severe mitral valve regurgitation    Abnormal nuclear stress test    Coronary artery disease involving native coronary artery of native heart without angina pectoris    Mitral regurgitation    Tricuspid regurgitation    Pre-op testing    Severe mitral regurgitation     1. CAD Post CABG x 3 (LIMA-LAD, SVG-Diag, SVG-RCA) 7/19/2024    Chart/labs/EKG/monitor reviewed.     2. VHD: S/p Complex MV repair by medial commissuroplasty and 30mm Medtronic Future band/TV repair with 28mm Medtronic TriAd band/SATHISH exclusion with 45mm AtriClip  7/19/2024.    Per CTS.     3. Acute respiratory failure: Per primary service.     4. HTN: Observe.     5. Anemia: Follow labs.     6. Tobacco use    7. Marijuana use    Available external charts reviewed.   Available imaging and evaluations independently reviewed.   Interviewed and discussed patient with available family.  Discussed case with referring service and non-cardiology consultants.     Greater than 50 minutes

## 2024-07-19 NOTE — PROGRESS NOTES
Pt extubated to 4L. No stridor heard. Voice is clear. Bilateral lung sounds clear/diminished. SPO2 100%.    Lauren Frommelt, RN      Otolaryngologist Procedure Text (A): After obtaining clear surgical margins the patient was sent to otolaryngology for surgical repair.  The patient understands they will receive post-surgical care and follow-up from the referring physician's office.

## 2024-07-19 NOTE — ANESTHESIA PROCEDURE NOTES
Arterial Line:    An arterial line was placed using ultrasound guidance and surface landmarks, in the OR for the following indication(s): continuous blood pressure monitoring and blood sampling needed.    A 20 gauge (size), 1 and 3/4 inch (length), Arrow (type) catheter was placed, Seldinger technique used, into the right radial artery, secured by tape.  Anesthesia type: Local  Local infiltration: Injection    Events:  patient tolerated procedure well with no complications.    Additional notes:  Well tolerated.  Anesthesiologist: Carmina Abdi MD  Other anesthesia staff: John Ang RN  Performed: Other anesthesia staff   Preanesthetic Checklist  Completed: patient identified, IV checked, site marked, risks and benefits discussed, surgical/procedural consents, equipment checked, pre-op evaluation, timeout performed, anesthesia consent given, oxygen available, monitors applied/VS acknowledged, fire risk safety assessment completed and verbalized and blood product R/B/A discussed and consented

## 2024-07-19 NOTE — BRIEF OP NOTE
Brief Postoperative Note      Binh Erazo  YOB: 1970  [id number]    Pre-operative Diagnosis: CAD/MR/TR    Post-operative Diagnosis: Same    Operation: Sternotomy/CABG x 3 (LIMA-LAD, SVG-Diag, SVG-RCA)/Complex mitral valve repair by medial commissuroplasty and 30mm Medtronic Future band/Tricuspid valve repair with 28mm Medtronic TriAd band/SATHISH exclusion with 45mm AtriClip/EVH RLE/Rigid internal fixation of the sternum with KLS plates x 2    Anesthesia: General    Surgeon: Magdalena    Assistant: Markus  Physician Assistant: Love Carter, APRN - CNP; Nora Hatch PA    Anesthesia: General    Estimated Blood Loss (mL): 1000    Complications: None    Specimens:   * No specimens in log *    Implants:  Implant Name Type Inv. Item Serial No.  Lot No. LRB No. Used Action   DEVICE OCCL CLP L45MM PLUNG GRP FLX SHFT FOR GILLINOV - [id number]  DEVICE OCCL CLP L45MM PLUNG GRP FLX SHFT FOR GILLINOV  [employer]-WD [id number] N/A 1 Implanted   BAND ANNULPLSTY [id number] 18.59X19.38U00C08.9MM ORIFICE AREA - [id number] Annuloplasty rings BAND ANNULPLSTY [id number] 18.59X19.62N43E51.9MM ORIFICE AREA [id number] [employer] INC-WD  N/A 1 Implanted   RING ANNULPLSTY 28MM MI TRIAD - [id number] Annuloplasty rings RING ANNULPLSTY 28MM MI TRIAD [id number] [employer] INC-WD [id number] N/A 1 Implanted   PLATE BONE 1.8MM THICKNESS STRNL LCK 6 H CP TI - [id number]  PLATE BONE 1.8MM THICKNESS STRNL LCK 6 H CP TI  K[employer] LP-WD  N/A 1 Implanted   PLATE LCK STRNL 8-H LAD T 1.8MM - [id number]  PLATE LCK STRNL 8-H LAD T 1.8MM  K[employer] LP-WD  N/A 1 Implanted   SCREW BNE L13MM DIA2.3MM THOR STRNL TI YURI DRL FREE LEV 1 - [id number]  SCREW BNE L13MM DIA2.3MM THOR STRNL TI YURI DRL FREE LEV 1  K[employer] LP-WD  N/A 8 Implanted   SCREW BNE L15MM DIA2.3MM THOR STRNL TI YURI DRL FREE LEV 1 - [id number]  SCREW BNE L15MM DIA2.3MM THOR STRNL TI YURI DRL FREE LEV 1  KLS MEGHNA LP-WD  N/A 6 Implanted         Drains:   Chest Tube Anterior  Mediastinal 1 (Active)   Chest Tube Airleak No 07/19/24 1017   Status Continuous Suction 07/19/24 1017   Suction -20 cm H2O 07/19/24 1017   Y Connector Used Yes 07/19/24 1017   Dressing Status New dressing applied;Clean, dry & intact 07/19/24 1017   Chest Tube Dressing Other (Comment) 07/19/24 1017       Chest Tube Anterior Mediastinal 2 (Active)   Chest Tube Airleak No 07/19/24 1016   Status Continuous Suction 07/19/24 1016   Suction -20 cm H2O 07/19/24 1016   Y Connector Used Yes 07/19/24 1016   Dressing Status New dressing applied;Clean, dry & intact 07/19/24 1016   Chest Tube Dressing Other (Comment) 07/19/24 1016       Chest Tube Pleural 3 (Active)   Chest Tube Airleak No 07/19/24 1017   Status Continuous Suction 07/19/24 1017   Suction -20 cm H2O 07/19/24 1017   Y Connector Used Yes 07/19/24 1017   Dressing Status New dressing applied;Clean, dry & intact 07/19/24 1017   Chest Tube Dressing Other (Comment) 07/19/24 1017       Chest Tube Pleural 4 (Active)   Chest Tube Airleak No 07/19/24 1016   Status Continuous Suction 07/19/24 1016   Suction -20 cm H2O 07/19/24 1016   Y Connector Used Yes 07/19/24 1016   Dressing Status New dressing applied;Clean, dry & intact 07/19/24 1016   Chest Tube Dressing Other (Comment) 07/19/24 1016       Urinary Catheter 07/19/24 Cosat-Temperature (Active)       Findings:  Infection Present At Time Of Surgery (PATOS) (choose all levels that have infection present):  No infection present  Other Findings: see dictation    Electronically signed by Cm Nichols MD on 7/19/2024 at 10:59 AM      #[id number]

## 2024-07-19 NOTE — PROGRESS NOTES
4 Eyes Skin Assessment     NAME:  Binh Erazo  YOB: 1970  MEDICAL RECORD NUMBER:  [id number]    The patient is being assessed for  Post-Op Surgical    I agree that at least one RN has performed a thorough Head to Toe Skin Assessment on the patient. ALL assessment sites listed below have been assessed.      Areas assessed by both nurses:    Head, Face, Ears, Shoulders, Back, Chest, Arms, Elbows, Hands, Sacrum. Buttock, Coccyx, Ischium, and Legs. Feet and Heels        Does the Patient have a Wound? No noted wound(s)       Armani Prevention initiated by RN: Yes  Wound Care Orders initiated by RN: No    Pressure Injury (Stage 3,4, Unstageable, DTI, NWPT, and Complex wounds) if present, place Wound referral order by RN under : No    New Ostomies, if present place, Ostomy referral order under : No     Nurse 1 eSignature: Electronically signed by Evelina Lowery RN on 7/19/24 at 7:19 PM EDT    **SHARE this note so that the co-signing nurse can place an eSignature**    Nurse 2 eSignature: {Esignature:[id number]}

## 2024-07-19 NOTE — ANESTHESIA PROCEDURE NOTES
Central Venous Line:    A central venous line was placed using ultrasound guidance and surface landmarks, in the OR for the following indication(s): central venous access and CVP monitoring.    Sterility preparation included the following: provider used sterile gloves, gown, hat and mask, hand hygiene performed prior to central venous catheter insertion, sterile gel and probe cover used in ultrasound-guided central venous catheter insertion and maximum sterile barriers used during central venous catheter insertion.    The patient was placed in Trendelenburg position.The right internal jugular vein was prepped.    The site was prepped with Chloraprep.  A 9 Fr (size), 10 (length), introducer double lumen was placed.    During the procedure, the following specific steps were taken: target vein identified, needle advanced into vein and blood aspirated and guidewire advanced into vein.    Intravenous verification was obtained by ultrasound, venous blood return and manometry.    Post insertion care included: all ports aspirated, all ports flushed easily, guidewire removed intact, Biopatch applied, line sutured in place and dressing applied.    During the procedure the patient experienced: patient tolerated procedure well with no complications.      Outcomes: uncomplicated and patient tolerated procedure well  Real-time US image taken/store: Yes  Anesthesia type: local..No  Staffing  Performed: Anesthesiologist   Anesthesiologist: Carmina Abdi MD  Performed by: Carmina Abdi MD  Authorized by: Carmina Abdi MD    Preanesthetic Checklist  Completed: patient identified, IV checked, site marked, risks and benefits discussed, surgical/procedural consents, equipment checked, pre-op evaluation, timeout performed, anesthesia consent given, oxygen available, monitors applied/VS acknowledged, fire risk safety assessment completed and verbalized and blood product R/B/A discussed and consented

## 2024-07-19 NOTE — ANESTHESIA PRE PROCEDURE
Department of Anesthesiology  Preprocedure Note       Name:  Binh Erazo   Age:  53 y.o.  :  1970                                          MRN:  [id number]         Date:  2024      Surgeon: Dr. Nichols    Procedure: MITRAL VALVE REPAIR, coronary artery bypass grafting, tricuspid valve repair    Medications prior to admission:   Prior to Admission medications    Medication Sig Start Date End Date Taking? Authorizing Provider   aspirin 81 MG EC tablet Take 1 tablet by mouth daily 24   Palmer Mcnulty MD   atorvastatin (LIPITOR) 40 MG tablet Take 1 tablet by mouth daily 24   Palmer Mcnulty MD   ibuprofen (ADVIL;MOTRIN) 200 MG tablet Take 1 tablet by mouth every 6 hours as needed for Pain    ProviderSaida MD   carvedilol (COREG) 3.125 MG tablet TAKE 1 TABLET BY MOUTH TWICE A DAY WITH MEALS 24   Palmer Mcnulty MD   lisinopril (PRINIVIL;ZESTRIL) 40 MG tablet Take 1 tablet by mouth daily 3/16/24   Palmer Mcnulty MD   hydroCHLOROthiazide (HYDRODIURIL) 25 MG tablet Take 1 tablet by mouth daily    Provider, MD Saida   amLODIPine (NORVASC) 5 MG tablet Take 2 tablets by mouth daily  Patient taking differently: Take 1 tablet by mouth nightly 20   Polly Flores DO       Current medications:    Current Facility-Administered Medications   Medication Dose Route Frequency Provider Last Rate Last Admin    0.9 % sodium chloride infusion   IntraVENous Continuous Sophy Marte PA        sodium chloride flush 0.9 % injection 5-40 mL  5-40 mL IntraVENous 2 times per day Sophy Marte PA        sodium chloride flush 0.9 % injection 5-40 mL  5-40 mL IntraVENous PRN Sophy Marte PA        0.9 % sodium chloride infusion   IntraVENous PRN Sophy Marte PA        ceFAZolin (ANCEF) 2,000 mg in sterile water 20 mL IV syringe  2,000 mg IntraVENous On Call to OR Sophy Marte PA           Allergies:    Allergies   Allergen Reactions    Cat Hair Extract Other (See

## 2024-07-19 NOTE — INTERVAL H&P NOTE
The patient's History and Physical was reviewed with the patient and I examined the patient. There was no change. The surgical site was confirmed by the patient and me. Plan: The risks, benefits, expected outcome, and alternative to the recommended procedure have been discussed with the patient. Patient understands and wants to proceed with the procedure.    Cm Nichols

## 2024-07-19 NOTE — PROGRESS NOTES
Pt arrived to [facility] at 1130. Pt connected to monitor and ventilator. Chest tubes hooked up to suction. Vital signs stable.    Lauren Frommelt, RN

## 2024-07-19 NOTE — ANESTHESIA PROCEDURE NOTES
Procedure Performed: CAMILLE       Start Time:        End Time:      Preanesthesia Checklist:  Patient identified, IV assessed, risks and benefits discussed, monitors and equipment assessed, procedure being performed at surgeon's request and anesthesia consent obtained.    General Procedure Information  Diagnostic Indications for Echo:  assessment of ascending aorta, assessment of surgical repair, defect repair evaluation, hemodynamic monitoring and assessment of valve function  Location performed:  OR  Intubated  Bite block placed  Heart visualized  Probe Insertion:  Easy  Probe Type:  3D and mulitplane  Modalities:  3D, color flow mapping, pulse wave Doppler and continuous wave Doppler    Echocardiographic and Doppler Measurements    Ventricles    Right Ventricle:  Cavity size normal.  Hypertrophy not present.  Thrombus not present.  Global function normal.    Left Ventricle:  Cavity size normal.  Hypertrophy not present.  Thrombus present.  Global Function normal.  Ejection Fraction 60%.      Ventricular Regional Function:  1- Basal Anteroseptal:  normal  2- Basal Anterior:  normal  3- Basal Anterolateral:  normal  4- Basal Inferolateral:  normal  5- Basal Inferior:  normal  6- Basal Inferoseptal:  normal  7- Mid Anteroseptal:  normal  8- Mid Anterior:  normal  9- Mid Anterolateral:  normal  10- Mid Inferolateral:  normal  11- Mid Inferior:  normal  12- Mid Inferoseptal:  normal  13- Apical Anterior:  normal  14- Apical Lateral:  normal  15- Apical Inferior:  normal  16- Apical Septal:  normal  17- [place]:  normal    Wall Motion Comments:       No WMA    Valves    Aortic Valve:  Annulus normal.  Stenosis not present.  Regurgitation none.  Leaflets normal.  Leaflet motions normal.      Mitral Valve:  Annulus normal.  Stenosis not present.  Regurgitation severe.  Leaflets normal.  Leaflet motions normal, prolapsed and flail.  Specific leaflet segments with abnormal motions are described in the following comments:

## 2024-07-19 NOTE — PROGRESS NOTES
CVICU Admission Note    Name: Binh Erazo  MRN: [id number]    CC: Postoperative Critical Care Management     Indication for Surgery/Procedure: CAD/MR/TR   LVEF:  60%    RVF:  NML    Important/Relevant PMH/PSH: HTN, HLD, tobacco abuse, arthritis     Procedure/Surgeries: 7/19/2024 Sternotomy/CABG x 3 (LIMA-LAD, SVG-Diag, SVG-RCA)/Complex mitral valve repair by medial commissuroplasty and 30mm Medtronic Future band/Tricuspid valve repair with 28mm Medtronic TriAd band/SATHISH exclusion with 45mm AtriClip/EVH RLE/Rigid internal fixation of the sternum with KLS plates x 2     Pacing wires: Atrial and Ventricular       Physical Exam:    /69   Pulse 70   Temp 98.2 °F (36.8 °C) (Bladder)   Resp 14   Ht 1.702 m (5' 7\")   Wt 69.9 kg (154 lb)   SpO2 100%   BMI 24.12 kg/m²     No results for input(s): \"WBC\", \"RBC\", \"HGB\", \"HCT\", \"MCV\", \"MCH\", \"MCHC\", \"RDW\", \"PLT\", \"MPV\" in the last 72 hours.  Recent Labs     07/19/24  1047 07/19/24  1126   K  --  4.07   CREATININE 0.9  --          Post operative CXR:            Atelectasis, No pneumothorax noted, Mildly increased vascular markings bilateral, No significant pleural effusion. ETT/lines/drains appear to be in proper position. Final Radiology report pending.       General Appearance: Arrived to ICU intubated placed on ventilator, no gtts   Eyes: PERRL  Pulmonary: Diminished bibasilar.  No wheezes, no accessory muscle use noted   Ventilator: Mode: AC/VC, 50 FiO2, 8 PEEP, 500 Vt   Cardiovascular: RRR, no heaves or thrills palpated   Telemetry: came out paced now SB 50s  Abdomen: Soft, OG to LIWS  Extremities: Palpable pulses all extremities, no edema   Neurologic/Psych: sedated  Skin: Warm and dry    Incision: MSI with ana lilia dressing intact      Assessment/Plan: Day of Surgery     1. CAD/MR/TR  S/p Sternotomy/CABG x 3 (LIMA-LAD, SVG-Diag, SVG-RCA)/Complex mitral valve repair by medial commissuroplasty and 30mm Medtronic Future band/Tricuspid valve repair with 28mm

## 2024-07-19 NOTE — PROGRESS NOTES
Patient arrived to the Surgical ICU from OR with jacobs catheter intact and patent. Securing device applied. Jacobs bag is hanging below the level of the bladder, safety clip attached to the bed sheet, tamper seal is intact, drainage bag is not on the floor, lack of dependent loop in tubing, and the drainage bag is less than half full.

## 2024-07-19 NOTE — PROGRESS NOTES
Patient was extubated to 4 liters/min via nasal cannula. Breath Sounds post extubation were clear throughout all lung fields anterior and posteriorly. Stridor was not present post extubation. SPO2 was 97%.      Performed by  Estela olivier RCP

## 2024-07-19 NOTE — ANESTHESIA POSTPROCEDURE EVALUATION
Department of Anesthesiology  Postprocedure Note    Patient: Binh Erazo  MRN: [id number]  YOB: 1970  Date of evaluation: 7/19/2024    Procedure Summary       Date: 07/19/24 Room / Location: [address]    Anesthesia Start: 0641 Anesthesia Stop: 1125    Procedure: CABG, EVH, MVRp, TVRp, CAMILLE Diagnosis:       Severe mitral regurgitation      (Severe mitral regurgitation [I34.0])    Surgeons: Cm Nichols MD Responsible Provider: Carmina Abdi MD    Anesthesia Type: General ASA Status: 4            Anesthesia Type: General    Cristofer Phase I: Cristofer Score: 10    Cristofer Phase II:      Anesthesia Post Evaluation    Patient location during evaluation: ICU  Patient participation: complete - patient participated  Level of consciousness: awake and alert  Airway patency: patent  Nausea & Vomiting: no nausea and no vomiting  Cardiovascular status: blood pressure returned to baseline and hemodynamically stable  Respiratory status: acceptable and spontaneous ventilation  Hydration status: euvolemic  Multimodal analgesia pain management approach  Pain management: adequate    No notable events documented.

## 2024-07-19 NOTE — PLAN OF CARE
Problem: Pain  Goal: Verbalizes/displays adequate comfort level or baseline comfort level  Outcome: Progressing     Problem: Neurosensory - Adult  Goal: Achieves maximal functionality and self care  Outcome: Progressing     Problem: Respiratory - Adult  Goal: Achieves optimal ventilation and oxygenation  Outcome: Progressing     Problem: Cardiovascular - Adult  Goal: Maintains optimal cardiac output and hemodynamic stability  Outcome: Progressing     Problem: Cardiovascular - Adult  Goal: Absence of cardiac dysrhythmias or at baseline  Outcome: Progressing     Problem: Musculoskeletal - Adult  Goal: Return mobility to safest level of function  Outcome: Progressing     Problem: Gastrointestinal - Adult  Goal: Maintains or returns to baseline bowel function  Outcome: Progressing     Problem: Genitourinary - Adult  Goal: Urinary catheter remains patent  Outcome: Progressing

## 2024-07-20 ENCOUNTER — APPOINTMENT (OUTPATIENT)
Dept: GENERAL RADIOLOGY | Age: 54
End: 2024-07-20
Attending: THORACIC SURGERY (CARDIOTHORACIC VASCULAR SURGERY)
Payer: COMMERCIAL

## 2024-07-20 LAB
ALBUMIN SERPL-MCNC: 3.8 G/DL (ref 3.5–5.2)
ALP SERPL-CCNC: 76 U/L (ref 40–129)
ALT SERPL-CCNC: 11 U/L (ref 0–40)
ANION GAP SERPL CALCULATED.3IONS-SCNC: 13 MMOL/L (ref 7–16)
ANION GAP SERPL CALCULATED.3IONS-SCNC: 14 MMOL/L (ref 7–16)
AST SERPL-CCNC: 44 U/L (ref 0–39)
BILIRUB SERPL-MCNC: 0.4 MG/DL (ref 0–1.2)
BUN SERPL-MCNC: 27 MG/DL (ref 6–20)
BUN SERPL-MCNC: 32 MG/DL (ref 6–20)
CA-I BLD-SCNC: 1.13 MMOL/L (ref 1.15–1.33)
CALCIUM SERPL-MCNC: 8.2 MG/DL (ref 8.6–10.2)
CALCIUM SERPL-MCNC: 8.3 MG/DL (ref 8.6–10.2)
CHLORIDE SERPL-SCNC: 104 MMOL/L (ref 98–107)
CHLORIDE SERPL-SCNC: 105 MMOL/L (ref 98–107)
CO2 SERPL-SCNC: 20 MMOL/L (ref 22–29)
CO2 SERPL-SCNC: 21 MMOL/L (ref 22–29)
CREAT SERPL-MCNC: 1.2 MG/DL (ref 0.7–1.2)
CREAT SERPL-MCNC: 1.5 MG/DL (ref 0.7–1.2)
ERYTHROCYTE [DISTWIDTH] IN BLOOD BY AUTOMATED COUNT: 14 % (ref 11.5–15)
GFR, ESTIMATED: 54 ML/MIN/1.73M2
GFR, ESTIMATED: 71 ML/MIN/1.73M2
GLUCOSE BLD-MCNC: 140 MG/DL (ref 74–99)
GLUCOSE BLD-MCNC: 143 MG/DL (ref 74–99)
GLUCOSE BLD-MCNC: 144 MG/DL (ref 74–99)
GLUCOSE BLD-MCNC: 149 MG/DL (ref 74–99)
GLUCOSE BLD-MCNC: 150 MG/DL (ref 74–99)
GLUCOSE BLD-MCNC: 165 MG/DL (ref 74–99)
GLUCOSE BLD-MCNC: 168 MG/DL (ref 74–99)
GLUCOSE BLD-MCNC: 205 MG/DL (ref 74–99)
GLUCOSE BLD-MCNC: 92 MG/DL (ref 74–99)
GLUCOSE SERPL-MCNC: 133 MG/DL (ref 74–99)
GLUCOSE SERPL-MCNC: 145 MG/DL (ref 74–99)
HCT VFR BLD AUTO: 22.4 % (ref 37–54)
HCT VFR BLD AUTO: 26 % (ref 37–54)
HGB BLD-MCNC: 7.3 G/DL (ref 12.5–16.5)
HGB BLD-MCNC: 8.4 G/DL (ref 12.5–16.5)
INR PPP: 1.2
MAGNESIUM SERPL-MCNC: 2.4 MG/DL (ref 1.6–2.6)
MCH RBC QN AUTO: 29.1 PG (ref 26–35)
MCHC RBC AUTO-ENTMCNC: 32.3 G/DL (ref 32–34.5)
MCV RBC AUTO: 90 FL (ref 80–99.9)
PLATELET # BLD AUTO: 132 K/UL (ref 130–450)
PMV BLD AUTO: 12.2 FL (ref 7–12)
POTASSIUM SERPL-SCNC: 3.8 MMOL/L (ref 3.5–5)
POTASSIUM SERPL-SCNC: 3.9 MMOL/L (ref 3.5–5)
POTASSIUM SERPL-SCNC: 4 MMOL/L (ref 3.5–5)
PROT SERPL-MCNC: 5.8 G/DL (ref 6.4–8.3)
PROTHROMBIN TIME: 13.6 SEC (ref 9.3–12.4)
RBC # BLD AUTO: 2.89 M/UL (ref 3.8–5.8)
SODIUM SERPL-SCNC: 138 MMOL/L (ref 132–146)
SODIUM SERPL-SCNC: 139 MMOL/L (ref 132–146)
WBC OTHER # BLD: 15.6 K/UL (ref 4.5–11.5)

## 2024-07-20 PROCEDURE — 2700000000 HC OXYGEN THERAPY PER DAY

## 2024-07-20 PROCEDURE — 6360000002 HC RX W HCPCS

## 2024-07-20 PROCEDURE — 6360000002 HC RX W HCPCS: Performed by: PHYSICIAN ASSISTANT

## 2024-07-20 PROCEDURE — 85027 COMPLETE CBC AUTOMATED: CPT

## 2024-07-20 PROCEDURE — 99233 SBSQ HOSP IP/OBS HIGH 50: CPT | Performed by: INTERNAL MEDICINE

## 2024-07-20 PROCEDURE — 85610 PROTHROMBIN TIME: CPT

## 2024-07-20 PROCEDURE — 85018 HEMOGLOBIN: CPT

## 2024-07-20 PROCEDURE — 80053 COMPREHEN METABOLIC PANEL: CPT

## 2024-07-20 PROCEDURE — 83735 ASSAY OF MAGNESIUM: CPT

## 2024-07-20 PROCEDURE — 80048 BASIC METABOLIC PNL TOTAL CA: CPT

## 2024-07-20 PROCEDURE — 6370000000 HC RX 637 (ALT 250 FOR IP): Performed by: PHYSICIAN ASSISTANT

## 2024-07-20 PROCEDURE — P9045 ALBUMIN (HUMAN), 5%, 250 ML: HCPCS

## 2024-07-20 PROCEDURE — 6360000002 HC RX W HCPCS: Performed by: THORACIC SURGERY (CARDIOTHORACIC VASCULAR SURGERY)

## 2024-07-20 PROCEDURE — P9047 ALBUMIN (HUMAN), 25%, 50ML: HCPCS | Performed by: THORACIC SURGERY (CARDIOTHORACIC VASCULAR SURGERY)

## 2024-07-20 PROCEDURE — 6370000000 HC RX 637 (ALT 250 FOR IP)

## 2024-07-20 PROCEDURE — 82962 GLUCOSE BLOOD TEST: CPT

## 2024-07-20 PROCEDURE — 2580000003 HC RX 258: Performed by: PHYSICIAN ASSISTANT

## 2024-07-20 PROCEDURE — 94669 MECHANICAL CHEST WALL OSCILL: CPT

## 2024-07-20 PROCEDURE — 71045 X-RAY EXAM CHEST 1 VIEW: CPT

## 2024-07-20 PROCEDURE — 36415 COLL VENOUS BLD VENIPUNCTURE: CPT

## 2024-07-20 PROCEDURE — 85014 HEMATOCRIT: CPT

## 2024-07-20 PROCEDURE — 37799 UNLISTED PX VASCULAR SURGERY: CPT

## 2024-07-20 PROCEDURE — 82330 ASSAY OF CALCIUM: CPT

## 2024-07-20 PROCEDURE — 2580000003 HC RX 258

## 2024-07-20 PROCEDURE — 2140000000 HC CCU INTERMEDIATE R&B

## 2024-07-20 PROCEDURE — 6370000000 HC RX 637 (ALT 250 FOR IP): Performed by: THORACIC SURGERY (CARDIOTHORACIC VASCULAR SURGERY)

## 2024-07-20 PROCEDURE — 84132 ASSAY OF SERUM POTASSIUM: CPT

## 2024-07-20 PROCEDURE — 94640 AIRWAY INHALATION TREATMENT: CPT

## 2024-07-20 RX ORDER — FOLIC ACID 1 MG/1
1 TABLET ORAL DAILY
Status: DISCONTINUED | OUTPATIENT
Start: 2024-07-20 | End: 2024-07-25 | Stop reason: HOSPADM

## 2024-07-20 RX ORDER — FERROUS SULFATE 325(65) MG
325 TABLET ORAL 2 TIMES DAILY WITH MEALS
Status: DISCONTINUED | OUTPATIENT
Start: 2024-07-20 | End: 2024-07-25 | Stop reason: HOSPADM

## 2024-07-20 RX ORDER — INSULIN LISPRO 100 [IU]/ML
0-8 INJECTION, SOLUTION INTRAVENOUS; SUBCUTANEOUS NIGHTLY
Status: DISCONTINUED | OUTPATIENT
Start: 2024-07-20 | End: 2024-07-25 | Stop reason: HOSPADM

## 2024-07-20 RX ORDER — LACTULOSE 10 G/15ML
20 SOLUTION ORAL 3 TIMES DAILY
Status: ACTIVE | OUTPATIENT
Start: 2024-07-21 | End: 2024-07-21

## 2024-07-20 RX ORDER — ALBUMIN (HUMAN) 12.5 G/50ML
25 SOLUTION INTRAVENOUS ONCE
Status: COMPLETED | OUTPATIENT
Start: 2024-07-20 | End: 2024-07-20

## 2024-07-20 RX ORDER — HYDRALAZINE HYDROCHLORIDE 20 MG/ML
10 INJECTION INTRAMUSCULAR; INTRAVENOUS EVERY 4 HOURS PRN
Status: DISCONTINUED | OUTPATIENT
Start: 2024-07-20 | End: 2024-07-25 | Stop reason: HOSPADM

## 2024-07-20 RX ORDER — HYDRALAZINE HYDROCHLORIDE 25 MG/1
25 TABLET, FILM COATED ORAL EVERY 8 HOURS SCHEDULED
Status: DISCONTINUED | OUTPATIENT
Start: 2024-07-20 | End: 2024-07-25 | Stop reason: HOSPADM

## 2024-07-20 RX ORDER — ASCORBIC ACID 500 MG
500 TABLET ORAL 2 TIMES DAILY
Status: DISCONTINUED | OUTPATIENT
Start: 2024-07-20 | End: 2024-07-25 | Stop reason: HOSPADM

## 2024-07-20 RX ORDER — ONDANSETRON 2 MG/ML
4 INJECTION INTRAMUSCULAR; INTRAVENOUS EVERY 8 HOURS PRN
Status: DISCONTINUED | OUTPATIENT
Start: 2024-07-20 | End: 2024-07-25 | Stop reason: HOSPADM

## 2024-07-20 RX ORDER — AMLODIPINE BESYLATE 5 MG/1
5 TABLET ORAL DAILY
Status: DISCONTINUED | OUTPATIENT
Start: 2024-07-20 | End: 2024-07-25 | Stop reason: HOSPADM

## 2024-07-20 RX ORDER — POLYETHYLENE GLYCOL 3350 17 G/17G
17 POWDER, FOR SOLUTION ORAL DAILY
Status: DISCONTINUED | OUTPATIENT
Start: 2024-07-20 | End: 2024-07-25 | Stop reason: HOSPADM

## 2024-07-20 RX ORDER — DIPHENHYDRAMINE HCL 25 MG
25 TABLET ORAL EVERY 8 HOURS PRN
Status: DISCONTINUED | OUTPATIENT
Start: 2024-07-20 | End: 2024-07-25 | Stop reason: HOSPADM

## 2024-07-20 RX ORDER — AMIODARONE HYDROCHLORIDE 200 MG/1
400 TABLET ORAL PRN
Status: DISCONTINUED | OUTPATIENT
Start: 2024-07-20 | End: 2024-07-25 | Stop reason: HOSPADM

## 2024-07-20 RX ORDER — INSULIN LISPRO 100 [IU]/ML
0-12 INJECTION, SOLUTION INTRAVENOUS; SUBCUTANEOUS
Status: DISCONTINUED | OUTPATIENT
Start: 2024-07-20 | End: 2024-07-25 | Stop reason: HOSPADM

## 2024-07-20 RX ORDER — GLUCAGON 1 MG/ML
1 KIT INJECTION PRN
Status: DISCONTINUED | OUTPATIENT
Start: 2024-07-20 | End: 2024-07-25 | Stop reason: HOSPADM

## 2024-07-20 RX ORDER — DEXTROSE MONOHYDRATE 100 MG/ML
INJECTION, SOLUTION INTRAVENOUS CONTINUOUS PRN
Status: DISCONTINUED | OUTPATIENT
Start: 2024-07-20 | End: 2024-07-25 | Stop reason: HOSPADM

## 2024-07-20 RX ORDER — MAGNESIUM SULFATE IN WATER 40 MG/ML
2000 INJECTION, SOLUTION INTRAVENOUS PRN
Status: DISCONTINUED | OUTPATIENT
Start: 2024-07-20 | End: 2024-07-25 | Stop reason: HOSPADM

## 2024-07-20 RX ORDER — HYDRALAZINE HYDROCHLORIDE 20 MG/ML
10 INJECTION INTRAMUSCULAR; INTRAVENOUS
Status: DISCONTINUED | OUTPATIENT
Start: 2024-07-20 | End: 2024-07-20

## 2024-07-20 RX ORDER — BISACODYL 10 MG
10 SUPPOSITORY, RECTAL RECTAL DAILY
Status: DISCONTINUED | OUTPATIENT
Start: 2024-07-20 | End: 2024-07-25 | Stop reason: HOSPADM

## 2024-07-20 RX ORDER — POTASSIUM CHLORIDE 20 MEQ/1
20 TABLET, EXTENDED RELEASE ORAL PRN
Status: DISCONTINUED | OUTPATIENT
Start: 2024-07-20 | End: 2024-07-25 | Stop reason: HOSPADM

## 2024-07-20 RX ORDER — PROCHLORPERAZINE EDISYLATE 5 MG/ML
10 INJECTION INTRAMUSCULAR; INTRAVENOUS EVERY 6 HOURS PRN
Status: DISCONTINUED | OUTPATIENT
Start: 2024-07-20 | End: 2024-07-25 | Stop reason: HOSPADM

## 2024-07-20 RX ADMIN — HYDRALAZINE HYDROCHLORIDE 10 MG: 20 INJECTION INTRAMUSCULAR; INTRAVENOUS at 08:30

## 2024-07-20 RX ADMIN — IPRATROPIUM BROMIDE AND ALBUTEROL SULFATE 1 DOSE: .5; 2.5 SOLUTION RESPIRATORY (INHALATION) at 22:08

## 2024-07-20 RX ADMIN — KETOROLAC TROMETHAMINE 15 MG: 30 INJECTION, SOLUTION INTRAMUSCULAR at 00:33

## 2024-07-20 RX ADMIN — Medication 500 MG: at 20:51

## 2024-07-20 RX ADMIN — WATER 2000 MG: 1 INJECTION INTRAMUSCULAR; INTRAVENOUS; SUBCUTANEOUS at 18:49

## 2024-07-20 RX ADMIN — HYDRALAZINE HYDROCHLORIDE 25 MG: 25 TABLET ORAL at 09:03

## 2024-07-20 RX ADMIN — TAMSULOSIN HYDROCHLORIDE 0.4 MG: 0.4 CAPSULE ORAL at 11:53

## 2024-07-20 RX ADMIN — AMLODIPINE BESYLATE 5 MG: 5 TABLET ORAL at 08:46

## 2024-07-20 RX ADMIN — ATORVASTATIN CALCIUM 40 MG: 40 TABLET, FILM COATED ORAL at 11:54

## 2024-07-20 RX ADMIN — ACETAMINOPHEN 1000 MG: 500 TABLET ORAL at 18:48

## 2024-07-20 RX ADMIN — POTASSIUM CHLORIDE 20 MEQ: 1500 TABLET, EXTENDED RELEASE ORAL at 20:51

## 2024-07-20 RX ADMIN — WATER 2000 MG: 1 INJECTION INTRAMUSCULAR; INTRAVENOUS; SUBCUTANEOUS at 03:05

## 2024-07-20 RX ADMIN — FERROUS SULFATE TAB 325 MG (65 MG ELEMENTAL FE) 325 MG: 325 (65 FE) TAB at 18:48

## 2024-07-20 RX ADMIN — FOLIC ACID 1 MG: 1 TABLET ORAL at 18:48

## 2024-07-20 RX ADMIN — MUPIROCIN: 20 OINTMENT TOPICAL at 08:46

## 2024-07-20 RX ADMIN — WATER 2000 MG: 1 INJECTION INTRAMUSCULAR; INTRAVENOUS; SUBCUTANEOUS at 10:35

## 2024-07-20 RX ADMIN — ASPIRIN 81 MG: 81 TABLET, COATED ORAL at 08:46

## 2024-07-20 RX ADMIN — MUPIROCIN: 20 OINTMENT TOPICAL at 20:54

## 2024-07-20 RX ADMIN — HYDRALAZINE HYDROCHLORIDE 25 MG: 25 TABLET ORAL at 13:51

## 2024-07-20 RX ADMIN — ONDANSETRON 4 MG: 2 INJECTION INTRAMUSCULAR; INTRAVENOUS at 18:49

## 2024-07-20 RX ADMIN — OXYCODONE HYDROCHLORIDE 10 MG: 10 TABLET ORAL at 02:38

## 2024-07-20 RX ADMIN — PANTOPRAZOLE SODIUM 40 MG: 40 TABLET, DELAYED RELEASE ORAL at 08:46

## 2024-07-20 RX ADMIN — ACETAMINOPHEN 1000 MG: 500 TABLET ORAL at 00:32

## 2024-07-20 RX ADMIN — ALBUMIN (HUMAN) 25 G: 12.5 INJECTION, SOLUTION INTRAVENOUS at 02:00

## 2024-07-20 RX ADMIN — SODIUM CHLORIDE, PRESERVATIVE FREE 10 ML: 5 INJECTION INTRAVENOUS at 08:47

## 2024-07-20 RX ADMIN — SODIUM CHLORIDE 2.9 UNITS/HR: 9 INJECTION, SOLUTION INTRAVENOUS at 00:38

## 2024-07-20 RX ADMIN — INSULIN LISPRO 2 UNITS: 100 INJECTION, SOLUTION INTRAVENOUS; SUBCUTANEOUS at 20:53

## 2024-07-20 RX ADMIN — HYDROMORPHONE HYDROCHLORIDE 0.5 MG: 1 INJECTION, SOLUTION INTRAMUSCULAR; INTRAVENOUS; SUBCUTANEOUS at 03:30

## 2024-07-20 RX ADMIN — ONDANSETRON 4 MG: 2 INJECTION INTRAMUSCULAR; INTRAVENOUS at 12:10

## 2024-07-20 RX ADMIN — Medication 400 MG: at 20:52

## 2024-07-20 RX ADMIN — ACETAMINOPHEN 1000 MG: 500 TABLET ORAL at 11:53

## 2024-07-20 RX ADMIN — ALBUMIN (HUMAN) 25 G: 0.25 INJECTION, SOLUTION INTRAVENOUS at 08:43

## 2024-07-20 RX ADMIN — ONDANSETRON 4 MG: 2 INJECTION INTRAMUSCULAR; INTRAVENOUS at 03:18

## 2024-07-20 RX ADMIN — SENNOSIDES AND DOCUSATE SODIUM 1 TABLET: 50; 8.6 TABLET ORAL at 11:53

## 2024-07-20 RX ADMIN — IPRATROPIUM BROMIDE AND ALBUTEROL SULFATE 1 DOSE: .5; 2.5 SOLUTION RESPIRATORY (INHALATION) at 16:55

## 2024-07-20 RX ADMIN — POLYETHYLENE GLYCOL 3350 17 G: 17 POWDER, FOR SOLUTION ORAL at 08:46

## 2024-07-20 RX ADMIN — HYDRALAZINE HYDROCHLORIDE 25 MG: 25 TABLET ORAL at 20:52

## 2024-07-20 RX ADMIN — INSULIN LISPRO 4 UNITS: 100 INJECTION, SOLUTION INTRAVENOUS; SUBCUTANEOUS at 09:10

## 2024-07-20 RX ADMIN — IPRATROPIUM BROMIDE AND ALBUTEROL SULFATE 1 DOSE: .5; 2.5 SOLUTION RESPIRATORY (INHALATION) at 12:57

## 2024-07-20 RX ADMIN — IPRATROPIUM BROMIDE AND ALBUTEROL SULFATE 1 DOSE: .5; 2.5 SOLUTION RESPIRATORY (INHALATION) at 06:36

## 2024-07-20 RX ADMIN — 0.12% CHLORHEXIDINE GLUCONATE 15 ML: 1.2 RINSE ORAL at 08:45

## 2024-07-20 RX ADMIN — HYDRALAZINE HYDROCHLORIDE 10 MG: 20 INJECTION INTRAMUSCULAR; INTRAVENOUS at 11:54

## 2024-07-20 RX ADMIN — ALBUMIN, HUMAN INJ 5% 25 G: 5 SOLUTION at 02:00

## 2024-07-20 ASSESSMENT — PAIN SCALES - GENERAL
PAINLEVEL_OUTOF10: 0
PAINLEVEL_OUTOF10: 3
PAINLEVEL_OUTOF10: 4
PAINLEVEL_OUTOF10: 9
PAINLEVEL_OUTOF10: 7
PAINLEVEL_OUTOF10: 5
PAINLEVEL_OUTOF10: 3
PAINLEVEL_OUTOF10: 9

## 2024-07-20 ASSESSMENT — PAIN DESCRIPTION - ORIENTATION
ORIENTATION: MID

## 2024-07-20 ASSESSMENT — PAIN DESCRIPTION - LOCATION
LOCATION: CHEST

## 2024-07-20 ASSESSMENT — PAIN DESCRIPTION - PAIN TYPE
TYPE: ACUTE PAIN;SURGICAL PAIN
TYPE: ACUTE PAIN
TYPE: ACUTE PAIN
TYPE: ACUTE PAIN;SURGICAL PAIN

## 2024-07-20 ASSESSMENT — PAIN DESCRIPTION - DESCRIPTORS
DESCRIPTORS: ACHING

## 2024-07-20 ASSESSMENT — PAIN DESCRIPTION - FREQUENCY
FREQUENCY: INTERMITTENT
FREQUENCY: INTERMITTENT
FREQUENCY: CONTINUOUS
FREQUENCY: CONTINUOUS

## 2024-07-20 ASSESSMENT — PAIN DESCRIPTION - ONSET
ONSET: PROGRESSIVE
ONSET: PROGRESSIVE

## 2024-07-20 NOTE — PROGRESS NOTES
Nurse to nurse report called to RN on PCCU, and patient's girlfriend Hiral notified of transfer location.

## 2024-07-20 NOTE — PLAN OF CARE
Problem: Pain  Goal: Verbalizes/displays adequate comfort level or baseline comfort level  7/19/2024 1931 by Frommelt, Lauren, RN  Outcome: Progressing     Problem: Neurosensory - Adult  Goal: Achieves maximal functionality and self care  7/19/2024 1931 by Frommelt, Lauren, RN  Outcome: Progressing     Problem: Respiratory - Adult  Goal: Achieves optimal ventilation and oxygenation  7/20/2024 0618 by Kristin Durant, RN  Outcome: Progressing  Flowsheets (Taken 7/20/2024 0618)  Achieves optimal ventilation and oxygenation:   Assess for changes in respiratory status   Assess for changes in mentation and behavior   Position to facilitate oxygenation and minimize respiratory effort   Oxygen supplementation based on oxygen saturation or arterial blood gases   Initiate smoking cessation protocol as indicated   Encourage broncho-pulmonary hygiene including cough, deep breathe, incentive spirometry  7/19/2024 1931 by Frommelt, Lauren, RN  Outcome: Progressing

## 2024-07-20 NOTE — PLAN OF CARE
Problem: Safety - Adult  Goal: Free from fall injury  Outcome: Progressing     Problem: Pain  Goal: Verbalizes/displays adequate comfort level or baseline comfort level  Outcome: Progressing     Problem: Cardiovascular - Adult  Goal: Maintains optimal cardiac output and hemodynamic stability  7/20/2024 1021 by Yadira Castro, RN  Outcome: Progressing  7/20/2024 0618 by Kristin Durant, RN  Outcome: Progressing  Flowsheets (Taken 7/20/2024 0618)  Maintains optimal cardiac output and hemodynamic stability:   Monitor blood pressure and heart rate   Monitor urine output and notify Licensed Independent Practitioner for values outside of normal range   Assess for signs of decreased cardiac output   Administer fluid and/or volume expanders as ordered   Administer vasoactive medications as ordered

## 2024-07-20 NOTE — PROGRESS NOTES
regurgitation with an anterior directed jet.    Left Atrium: Left atrium is dilated. No left atrial appendage thrombus noted.    Interatrial Septum: No evidence of right to left interatrial shunting on bubble study.    - Cardiac cath 4/24/24:  1.  Coronary artery disease  Left main: No significant angiographic  LAD: Focal 70% stenosis just after the first diagonal and before the first septal  branch.  70% apical LAD disease after it wraparound apex 70% ostial stenosis of a moderate-sized first diagonal branch  Intermediate ramus/high first obtuse marginal: No significant angiographic disease  LCx: 70% stenosis before the first obtuse marginal branch 70% ostial stenosis of the obtuse marginal branch long irregular up to 70% mid left circumflex stenosis before the terminal lateral branch.  RCA: Dominant vessel which is excessively tortuous with 50% proximal vessel stenosis and 40% mid vessel disease  2.  Left ventricular end-diastolic pressure = 2 mmHg         ASSESSMENT/PLAN:      53-year-old male with past medical history of CAD and VHD s/p CABG x 3 (LIMA to LAD, SVG-diagonal, SVG-RCA) on 7/19/2024 along with mitral valve repair (30 mm Medtronic Future band) and tricuspid valve repair (28 mm Medtronic triad band) and SATHISH excision with 45 mm AtriClip (7/19/2024), HTN, marijuana, cigar use.    CAD:  s/p CABG x 3 (LIMA to LAD, SVG-diagonal, SVG-RCA) on 7/19/2024  Consider aspirin when okay from surgery standpoint  Continue atorvastatin 40 mg daily  VHD:  mitral valve repair (30 mm Medtronic Future band) and tricuspid valve repair (28 mm Medtronic triad band) and SATHISH excision with 45 mm AtriClip (7/19/2024)  Consider TTE prior to discharge  HTN:  Continue amlodipine 5 mg daily  Continue hydralazine 25 g daily      Bijan Muse MD  Interventional Cardiology/ Structural heart disease

## 2024-07-20 NOTE — PROGRESS NOTES
Stable.  Labs ok- Cr 1.5- recheck later, stop toradol, volume.  Allow a higher BP.  Add PO meds.  CT min- remove big tubes.  Floor.  Cm Nichols MD      Chest tube(s) removed without difficulty. Patient tolerated well

## 2024-07-20 NOTE — PROGRESS NOTES
4 Eyes Skin Assessment     NAME:  Binh Erazo  YOB: 1970  MEDICAL RECORD NUMBER:  [id number]    The patient is being assessed for  Transfer to New Unit    I agree that at least one RN has performed a thorough Head to Toe Skin Assessment on the patient. ALL assessment sites listed below have been assessed.      Areas assessed by both nurses:    Head, Face, Ears, Shoulders, Back, Chest, Arms, Elbows, Hands, Sacrum. Buttock, Coccyx, Ischium, Legs. Feet and Heels, and Under Medical Devices         Does the Patient have a Wound? No noted wound(s)       Armani Prevention initiated by RN: No  Wound Care Orders initiated by RN: No    Pressure Injury (Stage 3,4, Unstageable, DTI, NWPT, and Complex wounds) if present, place Wound referral order by RN under : No    New Ostomies, if present place, Ostomy referral order under : No     Nurse 1 eSignature: Electronically signed by Yessenia Marquez RN on 7/20/24 at 6:36 PM EDT    **SHARE this note so that the co-signing nurse can place an eSignature**    Nurse 2 eSignature: Electronically signed by Kayley Birmingham RN on 7/20/24 at 6:37 PM EDT

## 2024-07-20 NOTE — OP NOTE
[facility]              [address] [place] [address]                            OPERATIVE REPORT      PATIENT NAME: SHAUN SIMMONS             : 1970  MED REC NO: [id number]                        ROOM: [id number]  ACCOUNT NO: [id number]                       ADMIT DATE: 2024  PROVIDER: Cm Nichols MD      DATE OF PROCEDURE:  2024    SURGEON:  Cm Nichols MD    PREOPERATIVE DIAGNOSES:  Severe multivessel coronary artery disease, severe mitral regurgitation, moderate tricuspid regurgitation, pulmonary hypertension, systemic hypertension.    POSTOPERATIVE DIAGNOSES:  Severe multivessel coronary artery disease, severe mitral regurgitation, moderate tricuspid regurgitation, pulmonary hypertension, systemic hypertension.    INDICATIONS:  Severe multivessel coronary artery disease, severe mitral regurgitation, moderate tricuspid regurgitation, pulmonary hypertension, systemic hypertension.    ASSISTANT:  Dr. Tennille Aparicio (no other resident was adequately trained was available to assist).  Dr. Aparicio was assisting throughout the critical portion of the operation including all the bypasses and valve port.    SECOND ASSISTANT:  Prudence Ayala (Love Carter harvested the vein).    COMPLICATIONS:  None, tolerated well.    ESTIMATED BLOOD LOSS:  Approximately 1000 mL.    ANESTHESIA:  General endotracheal anesthesia.    ANESTHESIOLOGIST:  Carmina Abdi MD.    SPECIMEN:  None.    DRAINS:  Two in the mediastinum, one in left chest and one in right chest.    OPERATIONS:    1. Sternotomy.  2. Coronary artery bypass grafting x3; left internal mammary artery to the LAD, saphenous vein graft to diagonal branch of the LAD, saphenous vein graft to right coronary artery.  3. Complex mitral valve repair by medial commissuroplasty and 30 mm Medtronic Future band.  4. Tricuspid valve repair with a 28 mm Medtronic Tri-Ad band.  5. Left atrial  Abdomen soft, non-tender, no guarding.

## 2024-07-21 LAB
ANION GAP SERPL CALCULATED.3IONS-SCNC: 9 MMOL/L (ref 7–16)
BUN SERPL-MCNC: 25 MG/DL (ref 6–20)
CALCIUM SERPL-MCNC: 8.3 MG/DL (ref 8.6–10.2)
CHLORIDE SERPL-SCNC: 105 MMOL/L (ref 98–107)
CO2 SERPL-SCNC: 24 MMOL/L (ref 22–29)
CREAT SERPL-MCNC: 0.9 MG/DL (ref 0.7–1.2)
ERYTHROCYTE [DISTWIDTH] IN BLOOD BY AUTOMATED COUNT: 14.5 % (ref 11.5–15)
GFR, ESTIMATED: >90 ML/MIN/1.73M2
GLUCOSE BLD-MCNC: 138 MG/DL (ref 74–99)
GLUCOSE BLD-MCNC: 140 MG/DL (ref 74–99)
GLUCOSE BLD-MCNC: 168 MG/DL (ref 74–99)
GLUCOSE BLD-MCNC: 176 MG/DL (ref 74–99)
GLUCOSE SERPL-MCNC: 126 MG/DL (ref 74–99)
HCT VFR BLD AUTO: 24.4 % (ref 37–54)
HGB BLD-MCNC: 7.9 G/DL (ref 12.5–16.5)
MAGNESIUM SERPL-MCNC: 2.2 MG/DL (ref 1.6–2.6)
MCH RBC QN AUTO: 29.3 PG (ref 26–35)
MCHC RBC AUTO-ENTMCNC: 32.4 G/DL (ref 32–34.5)
MCV RBC AUTO: 90.4 FL (ref 80–99.9)
PLATELET # BLD AUTO: 122 K/UL (ref 130–450)
PMV BLD AUTO: 10.9 FL (ref 7–12)
POTASSIUM SERPL-SCNC: 4.2 MMOL/L (ref 3.5–5)
RBC # BLD AUTO: 2.7 M/UL (ref 3.8–5.8)
SODIUM SERPL-SCNC: 138 MMOL/L (ref 132–146)
WBC OTHER # BLD: 19.5 K/UL (ref 4.5–11.5)

## 2024-07-21 PROCEDURE — 6360000002 HC RX W HCPCS

## 2024-07-21 PROCEDURE — 36415 COLL VENOUS BLD VENIPUNCTURE: CPT

## 2024-07-21 PROCEDURE — 99233 SBSQ HOSP IP/OBS HIGH 50: CPT | Performed by: INTERNAL MEDICINE

## 2024-07-21 PROCEDURE — 6370000000 HC RX 637 (ALT 250 FOR IP)

## 2024-07-21 PROCEDURE — 80048 BASIC METABOLIC PNL TOTAL CA: CPT

## 2024-07-21 PROCEDURE — 94669 MECHANICAL CHEST WALL OSCILL: CPT

## 2024-07-21 PROCEDURE — 83735 ASSAY OF MAGNESIUM: CPT

## 2024-07-21 PROCEDURE — 85027 COMPLETE CBC AUTOMATED: CPT

## 2024-07-21 PROCEDURE — 2140000000 HC CCU INTERMEDIATE R&B

## 2024-07-21 PROCEDURE — 2700000000 HC OXYGEN THERAPY PER DAY

## 2024-07-21 PROCEDURE — 93798 PHYS/QHP OP CAR RHAB W/ECG: CPT

## 2024-07-21 PROCEDURE — 94640 AIRWAY INHALATION TREATMENT: CPT

## 2024-07-21 PROCEDURE — 2580000003 HC RX 258

## 2024-07-21 PROCEDURE — 82962 GLUCOSE BLOOD TEST: CPT

## 2024-07-21 RX ORDER — ENOXAPARIN SODIUM 100 MG/ML
40 INJECTION SUBCUTANEOUS DAILY
Status: DISCONTINUED | OUTPATIENT
Start: 2024-07-22 | End: 2024-07-24

## 2024-07-21 RX ORDER — CARVEDILOL 3.12 MG/1
3.12 TABLET ORAL 2 TIMES DAILY WITH MEALS
Status: DISCONTINUED | OUTPATIENT
Start: 2024-07-21 | End: 2024-07-25 | Stop reason: HOSPADM

## 2024-07-21 RX ORDER — HYDROCODONE BITARTRATE AND ACETAMINOPHEN 5; 325 MG/1; MG/1
1 TABLET ORAL EVERY 4 HOURS PRN
Status: DISCONTINUED | OUTPATIENT
Start: 2024-07-21 | End: 2024-07-25 | Stop reason: HOSPADM

## 2024-07-21 RX ORDER — ASPIRIN 81 MG/1
81 TABLET ORAL DAILY
Status: DISCONTINUED | OUTPATIENT
Start: 2024-07-21 | End: 2024-07-25 | Stop reason: HOSPADM

## 2024-07-21 RX ORDER — HYDROCODONE BITARTRATE AND ACETAMINOPHEN 5; 325 MG/1; MG/1
2 TABLET ORAL EVERY 4 HOURS PRN
Status: DISCONTINUED | OUTPATIENT
Start: 2024-07-21 | End: 2024-07-25 | Stop reason: HOSPADM

## 2024-07-21 RX ADMIN — HYDRALAZINE HYDROCHLORIDE 25 MG: 25 TABLET ORAL at 14:02

## 2024-07-21 RX ADMIN — HYDROCODONE BITARTRATE AND ACETAMINOPHEN 2 TABLET: 5; 325 TABLET ORAL at 20:42

## 2024-07-21 RX ADMIN — INSULIN LISPRO 2 UNITS: 100 INJECTION, SOLUTION INTRAVENOUS; SUBCUTANEOUS at 08:50

## 2024-07-21 RX ADMIN — ATORVASTATIN CALCIUM 40 MG: 40 TABLET, FILM COATED ORAL at 08:49

## 2024-07-21 RX ADMIN — HYDRALAZINE HYDROCHLORIDE 25 MG: 25 TABLET ORAL at 05:47

## 2024-07-21 RX ADMIN — INSULIN LISPRO 2 UNITS: 100 INJECTION, SOLUTION INTRAVENOUS; SUBCUTANEOUS at 20:37

## 2024-07-21 RX ADMIN — IPRATROPIUM BROMIDE AND ALBUTEROL SULFATE 1 DOSE: .5; 2.5 SOLUTION RESPIRATORY (INHALATION) at 12:31

## 2024-07-21 RX ADMIN — ONDANSETRON 4 MG: 2 INJECTION INTRAMUSCULAR; INTRAVENOUS at 03:08

## 2024-07-21 RX ADMIN — CARVEDILOL 3.12 MG: 3.12 TABLET, FILM COATED ORAL at 18:20

## 2024-07-21 RX ADMIN — IPRATROPIUM BROMIDE AND ALBUTEROL SULFATE 1 DOSE: .5; 2.5 SOLUTION RESPIRATORY (INHALATION) at 08:33

## 2024-07-21 RX ADMIN — FOLIC ACID 1 MG: 1 TABLET ORAL at 08:49

## 2024-07-21 RX ADMIN — MUPIROCIN: 20 OINTMENT TOPICAL at 20:38

## 2024-07-21 RX ADMIN — Medication 500 MG: at 20:37

## 2024-07-21 RX ADMIN — Medication 400 MG: at 08:49

## 2024-07-21 RX ADMIN — HYDROMORPHONE HYDROCHLORIDE 0.25 MG: 1 INJECTION, SOLUTION INTRAMUSCULAR; INTRAVENOUS; SUBCUTANEOUS at 16:53

## 2024-07-21 RX ADMIN — POLYETHYLENE GLYCOL 3350 17 G: 17 POWDER, FOR SOLUTION ORAL at 08:48

## 2024-07-21 RX ADMIN — HYDROMORPHONE HYDROCHLORIDE 0.25 MG: 1 INJECTION, SOLUTION INTRAMUSCULAR; INTRAVENOUS; SUBCUTANEOUS at 03:08

## 2024-07-21 RX ADMIN — Medication 400 MG: at 20:37

## 2024-07-21 RX ADMIN — WATER 2000 MG: 1 INJECTION INTRAMUSCULAR; INTRAVENOUS; SUBCUTANEOUS at 03:07

## 2024-07-21 RX ADMIN — ACETAMINOPHEN 1000 MG: 500 TABLET ORAL at 00:09

## 2024-07-21 RX ADMIN — TAMSULOSIN HYDROCHLORIDE 0.4 MG: 0.4 CAPSULE ORAL at 08:48

## 2024-07-21 RX ADMIN — ONDANSETRON 4 MG: 2 INJECTION INTRAMUSCULAR; INTRAVENOUS at 12:27

## 2024-07-21 RX ADMIN — HYDROCODONE BITARTRATE AND ACETAMINOPHEN 2 TABLET: 5; 325 TABLET ORAL at 12:25

## 2024-07-21 RX ADMIN — MUPIROCIN: 20 OINTMENT TOPICAL at 08:52

## 2024-07-21 RX ADMIN — FERROUS SULFATE TAB 325 MG (65 MG ELEMENTAL FE) 325 MG: 325 (65 FE) TAB at 08:49

## 2024-07-21 RX ADMIN — PANTOPRAZOLE SODIUM 40 MG: 40 TABLET, DELAYED RELEASE ORAL at 05:47

## 2024-07-21 RX ADMIN — INSULIN LISPRO 2 UNITS: 100 INJECTION, SOLUTION INTRAVENOUS; SUBCUTANEOUS at 12:26

## 2024-07-21 RX ADMIN — ASPIRIN 81 MG: 81 TABLET, COATED ORAL at 08:49

## 2024-07-21 RX ADMIN — AMLODIPINE BESYLATE 5 MG: 5 TABLET ORAL at 08:49

## 2024-07-21 RX ADMIN — FERROUS SULFATE TAB 325 MG (65 MG ELEMENTAL FE) 325 MG: 325 (65 FE) TAB at 16:40

## 2024-07-21 RX ADMIN — IPRATROPIUM BROMIDE AND ALBUTEROL SULFATE 1 DOSE: .5; 2.5 SOLUTION RESPIRATORY (INHALATION) at 21:25

## 2024-07-21 RX ADMIN — Medication 500 MG: at 08:49

## 2024-07-21 RX ADMIN — HYDRALAZINE HYDROCHLORIDE 25 MG: 25 TABLET ORAL at 22:08

## 2024-07-21 RX ADMIN — ACETAMINOPHEN 1000 MG: 500 TABLET ORAL at 05:47

## 2024-07-21 ASSESSMENT — PAIN - FUNCTIONAL ASSESSMENT
PAIN_FUNCTIONAL_ASSESSMENT: PREVENTS OR INTERFERES SOME ACTIVE ACTIVITIES AND ADLS
PAIN_FUNCTIONAL_ASSESSMENT: PREVENTS OR INTERFERES WITH MANY ACTIVE NOT PASSIVE ACTIVITIES
PAIN_FUNCTIONAL_ASSESSMENT: ACTIVITIES ARE NOT PREVENTED
PAIN_FUNCTIONAL_ASSESSMENT: PREVENTS OR INTERFERES WITH ALL ACTIVE AND SOME PASSIVE ACTIVITIES
PAIN_FUNCTIONAL_ASSESSMENT: ACTIVITIES ARE NOT PREVENTED

## 2024-07-21 ASSESSMENT — PAIN DESCRIPTION - ORIENTATION
ORIENTATION: MID

## 2024-07-21 ASSESSMENT — PAIN DESCRIPTION - LOCATION
LOCATION: CHEST
LOCATION: CHEST;INCISION
LOCATION: INCISION
LOCATION: CHEST
LOCATION: CHEST;INCISION

## 2024-07-21 ASSESSMENT — PAIN DESCRIPTION - ONSET
ONSET: AWAKENED FROM SLEEP
ONSET: ON-GOING

## 2024-07-21 ASSESSMENT — PAIN SCALES - GENERAL
PAINLEVEL_OUTOF10: 8
PAINLEVEL_OUTOF10: 7
PAINLEVEL_OUTOF10: 8
PAINLEVEL_OUTOF10: 10
PAINLEVEL_OUTOF10: 0
PAINLEVEL_OUTOF10: 8
PAINLEVEL_OUTOF10: 10
PAINLEVEL_OUTOF10: 3
PAINLEVEL_OUTOF10: 0
PAINLEVEL_OUTOF10: 3

## 2024-07-21 ASSESSMENT — PAIN DESCRIPTION - FREQUENCY
FREQUENCY: INTERMITTENT
FREQUENCY: CONTINUOUS
FREQUENCY: CONTINUOUS

## 2024-07-21 ASSESSMENT — PAIN DESCRIPTION - DESCRIPTORS
DESCRIPTORS: ACHING;DISCOMFORT;SORE;TENDER
DESCRIPTORS: DISCOMFORT;ACHING;SORE
DESCRIPTORS: ACHING;DISCOMFORT;SORE;TENDER
DESCRIPTORS: DISCOMFORT;SORE;TENDER
DESCRIPTORS: ACHING;DISCOMFORT;TENDER;SORE
DESCRIPTORS: ACHING;DISCOMFORT;SORE
DESCRIPTORS: ACHING;DISCOMFORT;SORE

## 2024-07-21 ASSESSMENT — PAIN SCALES - WONG BAKER: WONGBAKER_NUMERICALRESPONSE: NO HURT

## 2024-07-21 ASSESSMENT — PAIN DESCRIPTION - PAIN TYPE
TYPE: SURGICAL PAIN

## 2024-07-21 NOTE — PROGRESS NOTES
[facility] Quality Flow/Interdisciplinary Rounds Progress Note        Quality Flow Rounds held on July 21, 2024    Disciplines Attending:  Bedside Nurse, , , and Nursing Unit Leadership    Binh Erazo was admitted on 7/19/2024  5:01 AM    Anticipated Discharge Date:       Disposition:    Armani Score:  Armani Scale Score: 16    Readmission Risk              Risk of Unplanned Readmission:  16           Discussed patient goal for the day, patient clinical progression, and barriers to discharge.  The following Goal(s) of the Day/Commitment(s) have been identified:  Diagnostics - Report Results and Labs - Report Results      Taisha Khan RN  July 21, 2024

## 2024-07-21 NOTE — DISCHARGE INSTR - DIET
Good nutrition is important when healing from an illness, injury, or surgery.  Follow any nutrition recommendations given to you during your hospital stay.   If you were given an oral nutrition supplement while in the hospital, continue to take this supplement at home.  You can take it with meals, in-between meals, and/or before bedtime. These supplements can be purchased at most local grocery stores, pharmacies, and chain super-stores.   If you have any questions about your diet or nutrition, call the hospital and ask for the dietitian.    Heart-Healthy Nutrition Therapy (2022)      A heart-healthy diet is recommended to reduce your unhealthy blood cholesterol levels, manage high blood pressure, and lower your risk for heart disease.  To follow a heart-healthy diet,  Eat a balanced diet with whole grains, fruits and vegetables, and lean protein sources.  Achieve and maintain a healthy weight.  Choose heart-healthy unsaturated fats. Limit saturated fats, trans fats, and cholesterol intake. Eat more plant-based or vegetarian meals using beans and soy foods for protein.  Eat whole, unprocessed foods to limit the amount of sodium (salt) you eat.  Limit refined carbohydrates especially sugar, sweets and sugar-sweetened beverages.  If you drink alcohol, do so in moderation: one serving per day (women) and two servings per day (men).  One serving is equivalent to 12 ounces beer, 5 ounces wine, or 1.5 ounces distilled spirits    Tips  Tips for Choosing Heart-Healthy Fats  Choose lean protein and low-fat dairy foods to reduce saturated fat intake.  Saturated fat is usually found in animal-based protein and is associated with certain health risks. Saturated fat is the biggest contributor to raised low-density lipoprotein (LDL) cholesterol levels in the diet. Research shows that limiting saturated fat lowers unhealthy cholesterol levels. Eat no more than 5-6% of your total calories each day from saturated fat. Ask your  serving  Biscuits, cornbread, and other “quick” breads prepared with baking soda  Bread crumbs or stuffing mix from a store  High-fat bakery products, such as doughnuts, biscuits, croissants, German pastries, pies, cookies  Instant cooking foods to which you add hot water and stir--potatoes, noodles, rice, etc.  Packaged starchy foods--seasoned noodle or rice dishes, stuffing mix, macaroni and cheese dinner  Snacks made with partially hydrogenated oils, including chips, cheese puffs, snack mixes, regular crackers, butter-flavored popcorn   Protein Foods Lean cuts of beef and pork (loin, leg, round, extra lean hamburger)  Skinless poultry  Fish  Venison and other wild game  Dried beans and peas  Nuts and nut butters (unsalted)  Seeds and seed butters (unsalted)  Meat alternatives made with soy or textured vegetable protein  Egg whites or egg substitute  Cold cuts made with lean meat or soy protein Higher-fat cuts of meats (ribs, t-bone steak, regular hamburger)  Rajput, sausage, or hot dogs  Cold cuts, such as salami or bologna, deli meats, cured meats, corned beef  Organ meats (liver, brains, gizzards, sweetbreads)  Poultry with skin  Fried or smoked meat, poultry, and fish  Whole eggs and egg yolks (more than 2-4 per week)  Salted legumes, nuts, seeds, or nut/seed butters  Meat alternatives with high levels of sodium (>300 mg per serving) or saturated fat (>5 g per serving)   Dairy and Dairy Alternatives Nonfat (skim), low-fat, or 1%-fat milk  Nonfat or low-fat yogurt or cottage cheese  Fat-free and low-fat cheese  Fortified non-dairy milk: almond, cashew, pea, and soy Whole milk, 2% fat milk, buttermilk  Whole milk yogurt or ice cream  Cream, half-&-half  Cream cheese  Sour cream  Cheese   Vegetables Fresh, frozen, or canned vegetables without added fat or salt  Avocados Canned or frozen vegetables with salt, fresh vegetables prepared with salt, butter, cheese, or cream sauce  Fried vegetables  Pickled vegetables

## 2024-07-21 NOTE — PROGRESS NOTES
POD#2 Awake, alert. No complaints other than expected post op pain. Denies CP, palpitations, SOB at rest, dizziness/lightheadedness.    Vitals:    07/21/24 0632 07/21/24 0743 07/21/24 0833 07/21/24 0957   BP:  138/79     Pulse:  68     Resp:  16     Temp:  97.7 °F (36.5 °C)     TempSrc:  Temporal     SpO2:  100% 100%    Weight: 71.3 kg (157 lb 3.2 oz)      Height:    1.702 m (5' 7.01\")     O2: 1L/NC 98%      Intake/Output Summary (Last 24 hours) at 7/21/2024 1058  Last data filed at 7/21/2024 1028  Gross per 24 hour   Intake 1150.05 ml   Output 1675 ml   Net -524.95 ml         No post op BM yet     UO: 825mL/8hr   CT output: Pleural: 40mL/8hr (290mL/24hrs)          Recent Labs     07/19/24  1130 07/20/24  0424 07/20/24  1357   WBC 20.0* 15.6*  --    HGB 10.0* 8.4* 7.3*   HCT 30.3* 26.0* 22.4*   * 132  --       Recent Labs     07/20/24  0424 07/20/24  1357 07/21/24  0548   BUN 27* 32* 25*   CREATININE 1.5* 1.2 0.9         Telemetry: SR      PE  Cardiac: RRR  Lungs: decreased bases  Chest incision with intact MIRTHA DSD. Sternum stable. Prior chest tube site incisions C/D/I, no erythema with intact sutures. Chest tubes x 2 and Epicardial pacing wires present and secure.   Abd: Soft, nontender, +BS  Ext: Incisions C/D/I, approximated, no erythema, + edema, groin stitch in place and secure            A/P: POD#2    1. CAD/MR/TR  --Stable s/p Sternotomy/CABG x 3 (LIMA-LAD, SVG-Diag, SVG-RCA)/Complex mitral valve repair by medial commissuroplasty and 30mm Medtronic Future band/Tricuspid valve repair with 28mm Medtronic TriAd band/SATHISH exclusion with 45mm AtriClip/EVH RLE/Rigid internal fixation of the sternum with KLS plates x 2  on 7/19/2024  --Post op CAMILLE reveals 60% EF  --will order TTE day before or day of discharge to evaluate MV -- ordered today 7/21  --Scr stable  --(+) ASA/statin -- hold off on BB for now -- likely attempt tomorrow   --will need eliquis prior to discharge for MVRp and TVRp   --reinforce sternal

## 2024-07-21 NOTE — PROGRESS NOTES
Comprehensive Nutrition Assessment    Type and Reason for Visit:  Initial, Consult, Patient Education (s/p CABG)    Nutrition Recommendations/Plan:   Continue current diet/ONS per ERAS to promote nutrient/PO intake and post op healing. Will continue to monitor.     Malnutrition Assessment:  Malnutrition Status:  No malnutrition (07/21/24 1016)    Context:  Acute Illness     Findings of the 6 clinical characteristics of malnutrition:  Energy Intake:  Mild decrease in energy intake (Comment)  Weight Loss:  No significant weight loss     Body Fat Loss:  No significant body fat loss     Muscle Mass Loss:  No significant muscle mass loss    Fluid Accumulation:  No significant fluid accumulation     Strength:  Not Performed    Nutrition Assessment:    Pt. admitted for evaluation of multivessel CAD and mitral regurgitation. Now s/p CABG, MVrp and TVrp on 7/19. PMHx of CAD,HTN. Will continue ONS per ERAS and monitor.    Nutrition Related Findings:    A&Ox4, +I/O, rounded soft abd, hypoactive BS, no edema, elevated BUN, hyperglycemia, Wound Type: Multiple, Surgical Incision (s/p CABG)       Current Nutrition Intake & Therapies:    Average Meal Intake: 0%  Average Supplements Intake: None Ordered  ADULT ORAL NUTRITION SUPPLEMENT; Breakfast, Lunch, Dinner; Diabetic Oral Supplement  ADULT ORAL NUTRITION SUPPLEMENT; AM Snack, PM Snack; Other Oral Supplement; Jose wound healing BID    Anthropometric Measures:  Height: 170.2 cm (5' 7.01\")  Ideal Body Weight (IBW): 148 lbs (67 kg)    Admission Body Weight: 71.3 kg (157 lb 3 oz) (7/21 Standing scale)  Current Body Weight: 71.3 kg (157 lb 3 oz) (7/21 Standing scale), 106.2 % IBW. Weight Source: Standing Scale  Current BMI (kg/m2): 24.6  Usual Body Weight: 72.6 kg (160 lb 0.9 oz) (8/15/23)  % Weight Change (Calculated): -1.8  Weight Adjustment For: No Adjustment                 BMI Categories: Normal Weight (BMI 18.5-24.9)    Estimated Daily Nutrient Needs:  Energy Requirements  Based On: Formula  Weight Used for Energy Requirements: Current  Energy (kcal/day): 1900-2100kcal (MSJ x1.3SF)  Weight Used for Protein Requirements: Current  Protein (g/day): 100-115g (1.4-1.6g/kgCBW)  Method Used for Fluid Requirements: 1 ml/kcal  Fluid (ml/day):     Nutrition Diagnosis:   Increased nutrient needs related to increase demand for energy/nutrients as evidenced by wounds    Nutrition Interventions:   Food and/or Nutrient Delivery: Continue Current Diet, Continue Oral Nutrition Supplement (continue ONS per ERAS)  Nutrition Education/Counseling: Education completed (DI handout provided via discharge instructions)  Coordination of Nutrition Care: Continue to monitor while inpatient       Goals:     Goals: PO intake 50% or greater, by next RD assessment       Nutrition Monitoring and Evaluation:   Behavioral-Environmental Outcomes: None Identified  Food/Nutrient Intake Outcomes: Food and Nutrient Intake, Supplement Intake  Physical Signs/Symptoms Outcomes: Biochemical Data, Chewing or Swallowing, GI Status, Fluid Status or Edema, Nutrition Focused Physical Findings, Skin, Weight    Discharge Planning:    Continue Oral Nutrition Supplement     Davidson Edward RD  Contact: ext [phone]

## 2024-07-21 NOTE — PROGRESS NOTES
Called environmental x2 re: Recliner chair. None currently available despite multiple transporters looking.Brooke Nur RN

## 2024-07-21 NOTE — PLAN OF CARE
Problem: Pain  Goal: Verbalizes/displays adequate comfort level or baseline comfort level  7/21/2024 1050 by Cynthia Bullock RN  Outcome: Progressing     Problem: Neurosensory - Adult  Goal: Achieves stable or improved neurological status  7/21/2024 1050 by Cynthia Bullock RN  Outcome: Progressing     Problem: Neurosensory - Adult  Goal: Achieves maximal functionality and self care  7/21/2024 1050 by Cynthia Bullock RN  Outcome: Progressing     Problem: Respiratory - Adult  Goal: Achieves optimal ventilation and oxygenation  7/21/2024 1050 by Cynthia Bullock RN  Outcome: Progressing     Problem: Cardiovascular - Adult  Goal: Absence of cardiac dysrhythmias or at baseline  7/21/2024 1050 by Cynthia Bullock RN  Outcome: Progressing     Problem: Musculoskeletal - Adult  Goal: Return ADL status to a safe level of function  7/21/2024 1050 by Cynthia Bullock RN  Outcome: Progressing

## 2024-07-21 NOTE — PROGRESS NOTES
Inpatient Cardiology Progress note        SUBJECTIVE/OBJECTIVE:   No complaints today.  Blood pressure good  No arrhythmias            PHYSICAL EXAM:   BP (!) 143/84   Pulse 80   Temp 98 °F (36.7 °C) (Temporal)   Resp 18   Ht 1.702 m (5' 7.01\")   Wt 71.3 kg (157 lb 3.2 oz)   SpO2 99%   BMI 24.62 kg/m²    B/P Range last 24 hours: Systolic (24hrs), Av , Min:131 , Max:151    Diastolic (24hrs), Av, Min:70, Max:84      GENERAL: Not in distress.   HEAD: Normocephalic, Atraumatic.   NECK: No JVD. No carotid bruits. No neck masses.?   CARDIOVASCULAR: Normal rate, regular rhythm,  normal S1, S2, no MRG    LUNGS: Clear to auscultation bilaterally, no wheezing.?   ABDOMEN: Abdomen is soft and not distended. No tenderness.  EXTREMITIES:There is mild right but no left pedal edema.   MUSCULOSKELETAL: No joint swelling. Normal range of motion?   SKIN: Skins is moist. No ulcers or lesions.   NEUROLOGICAL: Conscious, alert, oriented to time, place and person. No evidence of obvious neurological deficits.?   PSYCHOLOGICAL: Patient is in normal mood.?       Intake/Output Summary (Last 24 hours) at 2024 1850  Last data filed at 2024 1645  Gross per 24 hour   Intake --   Output 1720 ml   Net -1720 ml       Weight:   Wt Readings from Last 3 Encounters:   24 71.3 kg (157 lb 3.2 oz)   24 69.9 kg (154 lb)   24 68 kg (150 lb)       Current Inpatient Medications:   aspirin  81 mg Oral Daily    [START ON 2024] enoxaparin  40 mg SubCUTAneous Daily    carvedilol  3.125 mg Oral BID WC    amLODIPine  5 mg Oral Daily    hydrALAZINE  25 mg Oral 3 times per day    bisacodyl  10 mg Rectal Daily    ferrous sulfate  325 mg Oral BID WC    folic acid  1 mg Oral Daily    insulin lispro  0-12 Units SubCUTAneous TID WC    insulin lispro  0-8 Units SubCUTAneous Nightly    lactulose  20 g Oral TID    vitamin C  500 mg Oral BID    polyethylene glycol  17 g Oral Daily    atorvastatin  40 mg Oral Daily

## 2024-07-21 NOTE — PLAN OF CARE
Problem: Discharge Planning  Goal: Discharge to home or other facility with appropriate resources  Outcome: Progressing     Problem: Safety - Adult  Goal: Free from fall injury  Outcome: Progressing     Problem: ABCDS Injury Assessment  Goal: Absence of physical injury  Outcome: Progressing     Problem: Pain  Goal: Verbalizes/displays adequate comfort level or baseline comfort level  Outcome: Progressing     Problem: Neurosensory - Adult  Goal: Achieves stable or improved neurological status  Outcome: Progressing  Goal: Achieves maximal functionality and self care  Outcome: Progressing     Problem: Respiratory - Adult  Goal: Achieves optimal ventilation and oxygenation  Outcome: Progressing     Problem: Cardiovascular - Adult  Goal: Maintains optimal cardiac output and hemodynamic stability  Outcome: Progressing  Goal: Absence of cardiac dysrhythmias or at baseline  Outcome: Progressing     Problem: Skin/Tissue Integrity - Adult  Goal: Incisions, wounds, or drain sites healing without S/S of infection  Outcome: Progressing     Problem: Musculoskeletal - Adult  Goal: Return mobility to safest level of function  Outcome: Progressing  Goal: Return ADL status to a safe level of function  Outcome: Progressing     Problem: Gastrointestinal - Adult  Goal: Minimal or absence of nausea and vomiting  Outcome: Progressing  Goal: Maintains or returns to baseline bowel function  Outcome: Progressing     Problem: Genitourinary - Adult  Goal: Absence of urinary retention  Outcome: Progressing  Goal: Urinary catheter remains patent  Outcome: Progressing     Problem: Infection - Adult  Goal: Absence of infection at discharge  Outcome: Progressing  Goal: Absence of fever/infection during anticipated neutropenic period  Outcome: Progressing     Problem: Skin/Tissue Integrity  Goal: Absence of new skin breakdown  Description: 1.  Monitor for areas of redness and/or skin breakdown  2.  Assess vascular access sites hourly  3.  Every

## 2024-07-22 ENCOUNTER — APPOINTMENT (OUTPATIENT)
Dept: GENERAL RADIOLOGY | Age: 54
End: 2024-07-22
Attending: THORACIC SURGERY (CARDIOTHORACIC VASCULAR SURGERY)
Payer: COMMERCIAL

## 2024-07-22 LAB
ALBUMIN SERPL-MCNC: 3.7 G/DL (ref 3.5–5.2)
ALP SERPL-CCNC: 79 U/L (ref 40–129)
ALT SERPL-CCNC: 9 U/L (ref 0–40)
ANION GAP SERPL CALCULATED.3IONS-SCNC: 10 MMOL/L (ref 7–16)
AST SERPL-CCNC: 30 U/L (ref 0–39)
BASOPHILS # BLD: 0 K/UL (ref 0–0.2)
BASOPHILS NFR BLD: 0 % (ref 0–2)
BILIRUB DIRECT SERPL-MCNC: 0.2 MG/DL (ref 0–0.3)
BILIRUB INDIRECT SERPL-MCNC: 0.4 MG/DL (ref 0–1)
BILIRUB SERPL-MCNC: 0.6 MG/DL (ref 0–1.2)
BUN SERPL-MCNC: 23 MG/DL (ref 6–20)
CALCIUM SERPL-MCNC: 8.8 MG/DL (ref 8.6–10.2)
CHLORIDE SERPL-SCNC: 102 MMOL/L (ref 98–107)
CO2 SERPL-SCNC: 26 MMOL/L (ref 22–29)
CREAT SERPL-MCNC: 0.8 MG/DL (ref 0.7–1.2)
EOSINOPHIL # BLD: 0 K/UL (ref 0.05–0.5)
EOSINOPHILS RELATIVE PERCENT: 0 % (ref 0–6)
ERYTHROCYTE [DISTWIDTH] IN BLOOD BY AUTOMATED COUNT: 14.4 % (ref 11.5–15)
GFR, ESTIMATED: >90 ML/MIN/1.73M2
GLUCOSE BLD-MCNC: 133 MG/DL (ref 74–99)
GLUCOSE BLD-MCNC: 144 MG/DL (ref 74–99)
GLUCOSE BLD-MCNC: 151 MG/DL (ref 74–99)
GLUCOSE BLD-MCNC: 167 MG/DL (ref 74–99)
GLUCOSE SERPL-MCNC: 117 MG/DL (ref 74–99)
HCT VFR BLD AUTO: 24.4 % (ref 37–54)
HGB BLD-MCNC: 7.8 G/DL (ref 12.5–16.5)
LYMPHOCYTES NFR BLD: 2.11 K/UL (ref 1.5–4)
LYMPHOCYTES RELATIVE PERCENT: 11 % (ref 20–42)
MAGNESIUM SERPL-MCNC: 2.5 MG/DL (ref 1.6–2.6)
MCH RBC QN AUTO: 29.1 PG (ref 26–35)
MCHC RBC AUTO-ENTMCNC: 32 G/DL (ref 32–34.5)
MCV RBC AUTO: 91 FL (ref 80–99.9)
MONOCYTES NFR BLD: 0 % (ref 2–12)
MONOCYTES NFR BLD: 0 K/UL (ref 0.1–0.95)
NEUTROPHILS NFR BLD: 89 % (ref 43–80)
NEUTS SEG NFR BLD: 16.59 K/UL (ref 1.8–7.3)
NUCLEATED RED BLOOD CELLS: 1 PER 100 WBC
PLATELET # BLD AUTO: 133 K/UL (ref 130–450)
PMV BLD AUTO: 11.8 FL (ref 7–12)
POTASSIUM SERPL-SCNC: 3.9 MMOL/L (ref 3.5–5)
PROT SERPL-MCNC: 6.3 G/DL (ref 6.4–8.3)
RBC # BLD AUTO: 2.68 M/UL (ref 3.8–5.8)
RBC # BLD: ABNORMAL 10*6/UL
SODIUM SERPL-SCNC: 138 MMOL/L (ref 132–146)
WBC OTHER # BLD: 18.7 K/UL (ref 4.5–11.5)

## 2024-07-22 PROCEDURE — 6360000002 HC RX W HCPCS: Performed by: PHYSICIAN ASSISTANT

## 2024-07-22 PROCEDURE — 93798 PHYS/QHP OP CAR RHAB W/ECG: CPT

## 2024-07-22 PROCEDURE — 94640 AIRWAY INHALATION TREATMENT: CPT

## 2024-07-22 PROCEDURE — 94669 MECHANICAL CHEST WALL OSCILL: CPT

## 2024-07-22 PROCEDURE — 6370000000 HC RX 637 (ALT 250 FOR IP)

## 2024-07-22 PROCEDURE — 6360000002 HC RX W HCPCS: Performed by: NURSE PRACTITIONER

## 2024-07-22 PROCEDURE — 83735 ASSAY OF MAGNESIUM: CPT

## 2024-07-22 PROCEDURE — 99233 SBSQ HOSP IP/OBS HIGH 50: CPT | Performed by: INTERNAL MEDICINE

## 2024-07-22 PROCEDURE — 2580000003 HC RX 258

## 2024-07-22 PROCEDURE — 6360000002 HC RX W HCPCS

## 2024-07-22 PROCEDURE — 85025 COMPLETE CBC W/AUTO DIFF WBC: CPT

## 2024-07-22 PROCEDURE — 74018 RADEX ABDOMEN 1 VIEW: CPT

## 2024-07-22 PROCEDURE — 82962 GLUCOSE BLOOD TEST: CPT

## 2024-07-22 PROCEDURE — 80053 COMPREHEN METABOLIC PANEL: CPT

## 2024-07-22 PROCEDURE — 2580000003 HC RX 258: Performed by: PHYSICIAN ASSISTANT

## 2024-07-22 PROCEDURE — 6370000000 HC RX 637 (ALT 250 FOR IP): Performed by: NURSE PRACTITIONER

## 2024-07-22 PROCEDURE — 36415 COLL VENOUS BLD VENIPUNCTURE: CPT

## 2024-07-22 PROCEDURE — 82248 BILIRUBIN DIRECT: CPT

## 2024-07-22 PROCEDURE — 2140000000 HC CCU INTERMEDIATE R&B

## 2024-07-22 PROCEDURE — 2580000003 HC RX 258: Performed by: NURSE PRACTITIONER

## 2024-07-22 RX ORDER — LACTULOSE 10 G/15ML
20 SOLUTION ORAL 3 TIMES DAILY
Status: ACTIVE | OUTPATIENT
Start: 2024-07-22 | End: 2024-07-22

## 2024-07-22 RX ORDER — LIDOCAINE HYDROCHLORIDE 10 MG/ML
50 INJECTION, SOLUTION EPIDURAL; INFILTRATION; INTRACAUDAL; PERINEURAL ONCE
Status: DISCONTINUED | OUTPATIENT
Start: 2024-07-22 | End: 2024-07-25 | Stop reason: HOSPADM

## 2024-07-22 RX ADMIN — MAGNESIUM SULFATE HEPTAHYDRATE 2000 MG: 40 INJECTION, SOLUTION INTRAVENOUS at 09:42

## 2024-07-22 RX ADMIN — POLYETHYLENE GLYCOL 3350 17 G: 17 POWDER, FOR SOLUTION ORAL at 08:44

## 2024-07-22 RX ADMIN — MUPIROCIN: 20 OINTMENT TOPICAL at 08:43

## 2024-07-22 RX ADMIN — Medication 400 MG: at 08:45

## 2024-07-22 RX ADMIN — CARVEDILOL 3.12 MG: 3.12 TABLET, FILM COATED ORAL at 17:13

## 2024-07-22 RX ADMIN — POTASSIUM CHLORIDE 20 MEQ: 1500 TABLET, EXTENDED RELEASE ORAL at 08:45

## 2024-07-22 RX ADMIN — HYDRALAZINE HYDROCHLORIDE 25 MG: 25 TABLET ORAL at 13:55

## 2024-07-22 RX ADMIN — MUPIROCIN: 20 OINTMENT TOPICAL at 20:56

## 2024-07-22 RX ADMIN — FERROUS SULFATE TAB 325 MG (65 MG ELEMENTAL FE) 325 MG: 325 (65 FE) TAB at 08:44

## 2024-07-22 RX ADMIN — INSULIN LISPRO 2 UNITS: 100 INJECTION, SOLUTION INTRAVENOUS; SUBCUTANEOUS at 17:12

## 2024-07-22 RX ADMIN — FERROUS SULFATE TAB 325 MG (65 MG ELEMENTAL FE) 325 MG: 325 (65 FE) TAB at 17:13

## 2024-07-22 RX ADMIN — CARVEDILOL 3.12 MG: 3.12 TABLET, FILM COATED ORAL at 08:44

## 2024-07-22 RX ADMIN — IPRATROPIUM BROMIDE AND ALBUTEROL SULFATE 1 DOSE: .5; 2.5 SOLUTION RESPIRATORY (INHALATION) at 12:47

## 2024-07-22 RX ADMIN — HYDRALAZINE HYDROCHLORIDE 25 MG: 25 TABLET ORAL at 20:55

## 2024-07-22 RX ADMIN — Medication 500 MG: at 08:45

## 2024-07-22 RX ADMIN — DEXTROSE MONOHYDRATE 150 MG: 50 INJECTION, SOLUTION INTRAVENOUS at 09:49

## 2024-07-22 RX ADMIN — IPRATROPIUM BROMIDE AND ALBUTEROL SULFATE 1 DOSE: .5; 2.5 SOLUTION RESPIRATORY (INHALATION) at 18:58

## 2024-07-22 RX ADMIN — HYDROCODONE BITARTRATE AND ACETAMINOPHEN 2 TABLET: 5; 325 TABLET ORAL at 03:49

## 2024-07-22 RX ADMIN — INSULIN LISPRO 2 UNITS: 100 INJECTION, SOLUTION INTRAVENOUS; SUBCUTANEOUS at 08:44

## 2024-07-22 RX ADMIN — IPRATROPIUM BROMIDE AND ALBUTEROL SULFATE 1 DOSE: .5; 2.5 SOLUTION RESPIRATORY (INHALATION) at 15:42

## 2024-07-22 RX ADMIN — PANTOPRAZOLE SODIUM 40 MG: 40 TABLET, DELAYED RELEASE ORAL at 06:57

## 2024-07-22 RX ADMIN — FOLIC ACID 1 MG: 1 TABLET ORAL at 08:45

## 2024-07-22 RX ADMIN — Medication 500 MG: at 20:55

## 2024-07-22 RX ADMIN — BISACODYL 10 MG: 10 SUPPOSITORY RECTAL at 11:19

## 2024-07-22 RX ADMIN — TAMSULOSIN HYDROCHLORIDE 0.4 MG: 0.4 CAPSULE ORAL at 08:45

## 2024-07-22 RX ADMIN — IPRATROPIUM BROMIDE AND ALBUTEROL SULFATE 1 DOSE: .5; 2.5 SOLUTION RESPIRATORY (INHALATION) at 09:28

## 2024-07-22 RX ADMIN — INSULIN LISPRO 2 UNITS: 100 INJECTION, SOLUTION INTRAVENOUS; SUBCUTANEOUS at 12:02

## 2024-07-22 RX ADMIN — ATORVASTATIN CALCIUM 40 MG: 40 TABLET, FILM COATED ORAL at 08:45

## 2024-07-22 RX ADMIN — ONDANSETRON 4 MG: 2 INJECTION INTRAMUSCULAR; INTRAVENOUS at 11:19

## 2024-07-22 RX ADMIN — PIPERACILLIN AND TAZOBACTAM 4500 MG: 4; .5 INJECTION, POWDER, FOR SOLUTION INTRAVENOUS at 11:25

## 2024-07-22 RX ADMIN — HYDRALAZINE HYDROCHLORIDE 25 MG: 25 TABLET ORAL at 06:57

## 2024-07-22 RX ADMIN — LACTULOSE 20 G: 20 SOLUTION ORAL at 11:19

## 2024-07-22 RX ADMIN — PIPERACILLIN AND TAZOBACTAM 3375 MG: 3; .375 INJECTION, POWDER, LYOPHILIZED, FOR SOLUTION INTRAVENOUS at 17:15

## 2024-07-22 RX ADMIN — AMLODIPINE BESYLATE 5 MG: 5 TABLET ORAL at 08:45

## 2024-07-22 RX ADMIN — HYDROCODONE BITARTRATE AND ACETAMINOPHEN 2 TABLET: 5; 325 TABLET ORAL at 08:43

## 2024-07-22 RX ADMIN — AMIODARONE HYDROCHLORIDE 0.5 MG/MIN: 50 INJECTION, SOLUTION INTRAVENOUS at 17:16

## 2024-07-22 RX ADMIN — ASPIRIN 81 MG: 81 TABLET, COATED ORAL at 08:45

## 2024-07-22 RX ADMIN — PROCHLORPERAZINE EDISYLATE 10 MG: 5 INJECTION INTRAMUSCULAR; INTRAVENOUS at 14:38

## 2024-07-22 RX ADMIN — AMIODARONE HYDROCHLORIDE 1 MG/MIN: 50 INJECTION, SOLUTION INTRAVENOUS at 10:15

## 2024-07-22 RX ADMIN — ENOXAPARIN SODIUM 40 MG: 100 INJECTION SUBCUTANEOUS at 08:44

## 2024-07-22 RX ADMIN — Medication 400 MG: at 20:55

## 2024-07-22 RX ADMIN — HYDROCODONE BITARTRATE AND ACETAMINOPHEN 1 TABLET: 5; 325 TABLET ORAL at 20:55

## 2024-07-22 ASSESSMENT — PAIN SCALES - GENERAL
PAINLEVEL_OUTOF10: 8
PAINLEVEL_OUTOF10: 3
PAINLEVEL_OUTOF10: 10
PAINLEVEL_OUTOF10: 5
PAINLEVEL_OUTOF10: 2

## 2024-07-22 ASSESSMENT — PAIN DESCRIPTION - FREQUENCY
FREQUENCY: INTERMITTENT
FREQUENCY: CONTINUOUS

## 2024-07-22 ASSESSMENT — PAIN DESCRIPTION - PAIN TYPE
TYPE: SURGICAL PAIN
TYPE: SURGICAL PAIN

## 2024-07-22 ASSESSMENT — PAIN DESCRIPTION - ORIENTATION
ORIENTATION: MID

## 2024-07-22 ASSESSMENT — PAIN DESCRIPTION - LOCATION
LOCATION: CHEST;INCISION
LOCATION: CHEST;INCISION
LOCATION: CHEST

## 2024-07-22 ASSESSMENT — PAIN DESCRIPTION - DESCRIPTORS
DESCRIPTORS: ACHING;DISCOMFORT;SORE
DESCRIPTORS: ACHING;STABBING;THROBBING
DESCRIPTORS: ACHING;DISCOMFORT;SORE

## 2024-07-22 ASSESSMENT — PAIN DESCRIPTION - ONSET
ONSET: ON-GOING
ONSET: GRADUAL

## 2024-07-22 ASSESSMENT — PAIN - FUNCTIONAL ASSESSMENT
PAIN_FUNCTIONAL_ASSESSMENT: ACTIVITIES ARE NOT PREVENTED
PAIN_FUNCTIONAL_ASSESSMENT: ACTIVITIES ARE NOT PREVENTED

## 2024-07-22 NOTE — PROGRESS NOTES
POD#3 Awake, alert. C/o nausea and abdominal tenderness. Denies CP, palpitations, SOB at rest, dizziness/lightheadedness.    Vitals:    07/21/24 2330 07/22/24 0414 07/22/24 0639 07/22/24 0727   BP: 125/70 126/77  132/72   Pulse: 78 73  83   Resp: 18 18  24   Temp: 99.6 °F (37.6 °C) 99.7 °F (37.6 °C)  98.3 °F (36.8 °C)   TempSrc: Temporal Oral  Temporal   SpO2: 97% 97%  96%   Weight:   70.3 kg (155 lb)    Height:         O2: none      Intake/Output Summary (Last 24 hours) at 7/22/2024 1014  Last data filed at 7/22/2024 0658  Gross per 24 hour   Intake 180 ml   Output 2340 ml   Net -2160 ml         +BM preop    UO: 1200mL/8hr   CT output: Pleural: 30mL/8hr (90mL/24hrs)     Pleural: 140mL/8hr (200mL/24hrs)      Recent Labs     07/20/24  0424 07/20/24  1357 07/21/24  1110 07/22/24  0801   WBC 15.6*  --  19.5* 18.7*   HGB 8.4* 7.3* 7.9* 7.8*   HCT 26.0* 22.4* 24.4* 24.4*     --  122* 133      Recent Labs     07/20/24  1357 07/21/24  0548 07/22/24  0428   BUN 32* 25* 23*   CREATININE 1.2 0.9 0.8         Telemetry: AF        PE  Cardiac: IRRR  Lungs: decreased bases  Chest incision with intact MIRTHA DSD. Sternum stable. Prior chest tube site incisions C/D/I, no erythema with intact sutures. Chest tubes x 2 and Epicardial pacing wires present and secure.   Abd: Softly distended, tender to palpation x 4 quads, +BS  Ext: Incisions C/D/I, approximated, no erythema, + edema. LEFT groin prior cannulation site soft, C/D/I, no palpable hematoma, stitch in place and secure                A/P: POD#3     1. CAD/MR/TR  --Stable s/p Sternotomy/CABG x 3 (LIMA-LAD, SVG-Diag, SVG-RCA)/Complex mitral valve repair by medial commissuroplasty and 30mm Medtronic Future band/Tricuspid valve repair with 28mm Medtronic TriAd band/SATHISH exclusion with 45mm AtriClip/EVH RLE/Rigid internal fixation of the sternum with KLS plates x 2  on 7/19/2024  --Post op CAMILLE reveals 60% EF  --will order TTE day before or day of discharge to evaluate MV --

## 2024-07-22 NOTE — CARE COORDINATION
Transition of care: POD#3 CABG x 3/MV repair/TV repair. CT removed. Wires cont. Labs noted. IV cordarone as ordered. IV zosyn 3,375mg q 8 hrs. KUB and echo ordered. Labs noted. Met with pt in room. Pt said he lives with his girlfriend, Hiral, in a 2 story home home. Has 4 stairs with no rail to enter home. His bedroom and bathroom are on the 1st level. Independent with ADLs and drives. Not a . No DME. Plan is to return home when medically ready. Pt said Hiral will be with him 24/7. Pt is aware he will need to ambulate 400ft prior to dc. No preference for [facility] agency. Referral was made to [facility] and they have a start of care on Thursday 07/25. PCP is Dr Edison Gonsalez and pharmacy is [facility] on [address]. Cm/sw will follow.       Case Management Assessment  Initial Evaluation    Date/Time of Evaluation: 7/22/2024 12:43 PM  Assessment Completed by: Keren Phan RN    If patient is discharged prior to next notation, then this note serves as note for discharge by case management.    Patient Name: Binh Erazo                   YOB: 1970  Diagnosis: Severe mitral regurgitation [I34.0]                   Date / Time: 7/19/2024  5:01 AM    Patient Admission Status: Inpatient   Readmission Risk (Low < 19, Mod (19-27), High > 27): Readmission Risk Score: 12.7    Current PCP: Edison Gonsalez III, DO  PCP verified by CM? Yes    Chart Reviewed: Yes      History Provided by: Patient  Patient Orientation: Alert and Oriented    Patient Cognition: Alert    Hospitalization in the last 30 days (Readmission):  No    If yes, Readmission Assessment in CM Navigator will be completed.    Advance Directives:      Code Status: Full Code       Discharge Planning:    Patient lives with: Spouse/Significant Other Type of Home: House  Primary Care Giver: Self  Patient Support Systems include: Spouse/Significant Other     ADLS  Prior functional level: Independent in ADLs/IADLs    Family can provide

## 2024-07-22 NOTE — ACP (ADVANCE CARE PLANNING)
Advance Care Planning   The patient has the following advanced directives on file:  Advance Directives       Power of  Living Will ACP-Advance Directive ACP-Power of     Not on File Not on File Not on File Not on File            Pt's contact:     Primary Decision Maker: Hiral Ozuna - Girlfriend - [phone]    The Patient has the following current code status:    Code Status: Full Code

## 2024-07-22 NOTE — PATIENT CARE CONFERENCE
[facility] Quality Flow/Interdisciplinary Rounds Progress Note        Quality Flow Rounds held on July 22, 2024    Disciplines Attending:  Bedside Nurse, , , and Nursing Unit Leadership    Binh Erazo was admitted on 7/19/2024  5:01 AM    Anticipated Discharge Date:       Disposition:    Armani Score:  Armani Scale Score: 17    Readmission Risk              Risk of Unplanned Readmission:  15           Discussed patient goal for the day, patient clinical progression, and barriers to discharge.  The following Goal(s) of the Day/Commitment(s) have been identified:  ambulate/discharge planning       Grace Morales RN  July 22, 2024

## 2024-07-23 ENCOUNTER — APPOINTMENT (OUTPATIENT)
Age: 54
End: 2024-07-23
Attending: THORACIC SURGERY (CARDIOTHORACIC VASCULAR SURGERY)
Payer: COMMERCIAL

## 2024-07-23 LAB
ABO/RH: NORMAL
ANION GAP SERPL CALCULATED.3IONS-SCNC: 12 MMOL/L (ref 7–16)
ANTIBODY SCREEN: NEGATIVE
ARM BAND NUMBER: NORMAL
BASOPHILS # BLD: 0.06 K/UL (ref 0–0.2)
BASOPHILS NFR BLD: 0 % (ref 0–2)
BLOOD BANK DISPENSE STATUS: NORMAL
BLOOD BANK SAMPLE EXPIRATION: NORMAL
BPU ID: NORMAL
BUN SERPL-MCNC: 29 MG/DL (ref 6–20)
CALCIUM SERPL-MCNC: 8.2 MG/DL (ref 8.6–10.2)
CHLORIDE SERPL-SCNC: 101 MMOL/L (ref 98–107)
CO2 SERPL-SCNC: 24 MMOL/L (ref 22–29)
COMPONENT: NORMAL
CREAT SERPL-MCNC: 1 MG/DL (ref 0.7–1.2)
CROSSMATCH RESULT: NORMAL
ECHO AO ASC DIAM: 2.5 CM
ECHO AO ASCENDING AORTA INDEX: 1.38 CM/M2
ECHO AV AREA PEAK VELOCITY: 1.9 CM2
ECHO AV AREA VTI: 2.3 CM2
ECHO AV AREA/BSA PEAK VELOCITY: 1 CM2/M2
ECHO AV AREA/BSA VTI: 1.3 CM2/M2
ECHO AV CUSP MM: 1.4 CM
ECHO AV MEAN GRADIENT: 7 MMHG
ECHO AV MEAN VELOCITY: 1.3 M/S
ECHO AV PEAK GRADIENT: 11 MMHG
ECHO AV PEAK VELOCITY: 1.6 M/S
ECHO AV VELOCITY RATIO: 0.63
ECHO AV VTI: 27.2 CM
ECHO BSA: 1.82 M2
ECHO LA DIAMETER INDEX: 2.49 CM/M2
ECHO LA DIAMETER: 4.5 CM
ECHO LA VOL A-L A2C: 79 ML (ref 18–58)
ECHO LA VOL A-L A4C: 92 ML (ref 18–58)
ECHO LA VOL MOD A2C: 74 ML (ref 18–58)
ECHO LA VOL MOD A4C: 86 ML (ref 18–58)
ECHO LA VOLUME AREA LENGTH: 90 ML
ECHO LA VOLUME INDEX A-L A2C: 44 ML/M2 (ref 16–34)
ECHO LA VOLUME INDEX A-L A4C: 51 ML/M2 (ref 16–34)
ECHO LA VOLUME INDEX AREA LENGTH: 50 ML/M2 (ref 16–34)
ECHO LA VOLUME INDEX MOD A2C: 41 ML/M2 (ref 16–34)
ECHO LA VOLUME INDEX MOD A4C: 48 ML/M2 (ref 16–34)
ECHO LV FRACTIONAL SHORTENING: 33 % (ref 28–44)
ECHO LV INTERNAL DIMENSION DIASTOLE INDEX: 2.49 CM/M2
ECHO LV INTERNAL DIMENSION DIASTOLIC: 4.5 CM (ref 4.2–5.9)
ECHO LV INTERNAL DIMENSION SYSTOLIC INDEX: 1.66 CM/M2
ECHO LV INTERNAL DIMENSION SYSTOLIC: 3 CM
ECHO LV IVSD: 1.1 CM (ref 0.6–1)
ECHO LV IVSS: 1.4 CM
ECHO LV MASS 2D: 186.4 G (ref 88–224)
ECHO LV MASS INDEX 2D: 103 G/M2 (ref 49–115)
ECHO LV POSTERIOR WALL DIASTOLIC: 1.2 CM (ref 0.6–1)
ECHO LV POSTERIOR WALL SYSTOLIC: 1.3 CM
ECHO LV RELATIVE WALL THICKNESS RATIO: 0.53
ECHO LVOT AREA: 3.1 CM2
ECHO LVOT AV VTI INDEX: 0.72
ECHO LVOT DIAM: 2 CM
ECHO LVOT MEAN GRADIENT: 3 MMHG
ECHO LVOT PEAK GRADIENT: 4 MMHG
ECHO LVOT PEAK VELOCITY: 1 M/S
ECHO LVOT STROKE VOLUME INDEX: 34 ML/M2
ECHO LVOT SV: 61.5 ML
ECHO LVOT VTI: 19.6 CM
ECHO MV "A" WAVE DURATION: 131.5 MSEC
ECHO MV A VELOCITY: 0.31 M/S
ECHO MV AREA VTI: 1.6 CM2
ECHO MV E DECELERATION TIME (DT): 279.2 MS
ECHO MV E VELOCITY: 1.28 M/S
ECHO MV E/A RATIO: 4.13
ECHO MV LVOT VTI INDEX: 1.98
ECHO MV MAX VELOCITY: 1.8 M/S
ECHO MV MEAN GRADIENT: 5 MMHG
ECHO MV MEAN VELOCITY: 1 M/S
ECHO MV PEAK GRADIENT: 12 MMHG
ECHO MV VTI: 38.8 CM
ECHO PV MAX VELOCITY: 1 M/S
ECHO PV MEAN GRADIENT: 2 MMHG
ECHO PV MEAN VELOCITY: 0.8 M/S
ECHO PV PEAK GRADIENT: 4 MMHG
ECHO PV VTI: 18 CM
ECHO RV INTERNAL DIMENSION: 3.2 CM
EOSINOPHIL # BLD: 0.09 K/UL (ref 0.05–0.5)
EOSINOPHILS RELATIVE PERCENT: 1 % (ref 0–6)
ERYTHROCYTE [DISTWIDTH] IN BLOOD BY AUTOMATED COUNT: 14.3 % (ref 11.5–15)
GFR, ESTIMATED: >90 ML/MIN/1.73M2
GLUCOSE BLD-MCNC: 123 MG/DL (ref 74–99)
GLUCOSE BLD-MCNC: 124 MG/DL (ref 74–99)
GLUCOSE BLD-MCNC: 130 MG/DL (ref 74–99)
GLUCOSE BLD-MCNC: 143 MG/DL (ref 74–99)
GLUCOSE SERPL-MCNC: 111 MG/DL (ref 74–99)
HCT VFR BLD AUTO: 22.7 % (ref 37–54)
HGB BLD-MCNC: 7.4 G/DL (ref 12.5–16.5)
IMM GRANULOCYTES # BLD AUTO: 0.11 K/UL (ref 0–0.58)
IMM GRANULOCYTES NFR BLD: 1 % (ref 0–5)
LYMPHOCYTES NFR BLD: 1.96 K/UL (ref 1.5–4)
LYMPHOCYTES RELATIVE PERCENT: 12 % (ref 20–42)
MAGNESIUM SERPL-MCNC: 2.4 MG/DL (ref 1.6–2.6)
MCH RBC QN AUTO: 29.2 PG (ref 26–35)
MCHC RBC AUTO-ENTMCNC: 32.6 G/DL (ref 32–34.5)
MCV RBC AUTO: 89.7 FL (ref 80–99.9)
MONOCYTES NFR BLD: 1.31 K/UL (ref 0.1–0.95)
MONOCYTES NFR BLD: 8 % (ref 2–12)
NEUTROPHILS NFR BLD: 79 % (ref 43–80)
NEUTS SEG NFR BLD: 13.21 K/UL (ref 1.8–7.3)
PLATELET # BLD AUTO: 186 K/UL (ref 130–450)
PMV BLD AUTO: 11.5 FL (ref 7–12)
POTASSIUM SERPL-SCNC: 3.9 MMOL/L (ref 3.5–5)
RBC # BLD AUTO: 2.53 M/UL (ref 3.8–5.8)
SODIUM SERPL-SCNC: 137 MMOL/L (ref 132–146)
TRANSFUSION STATUS: NORMAL
UNIT DIVISION: 0
WBC OTHER # BLD: 16.7 K/UL (ref 4.5–11.5)

## 2024-07-23 PROCEDURE — P9047 ALBUMIN (HUMAN), 25%, 50ML: HCPCS | Performed by: NURSE PRACTITIONER

## 2024-07-23 PROCEDURE — 93306 TTE W/DOPPLER COMPLETE: CPT

## 2024-07-23 PROCEDURE — 6370000000 HC RX 637 (ALT 250 FOR IP)

## 2024-07-23 PROCEDURE — 85025 COMPLETE CBC W/AUTO DIFF WBC: CPT

## 2024-07-23 PROCEDURE — 2580000003 HC RX 258: Performed by: NURSE PRACTITIONER

## 2024-07-23 PROCEDURE — 93306 TTE W/DOPPLER COMPLETE: CPT | Performed by: INTERNAL MEDICINE

## 2024-07-23 PROCEDURE — 6360000002 HC RX W HCPCS

## 2024-07-23 PROCEDURE — 6370000000 HC RX 637 (ALT 250 FOR IP): Performed by: NURSE PRACTITIONER

## 2024-07-23 PROCEDURE — 97535 SELF CARE MNGMENT TRAINING: CPT

## 2024-07-23 PROCEDURE — 83735 ASSAY OF MAGNESIUM: CPT

## 2024-07-23 PROCEDURE — 97161 PT EVAL LOW COMPLEX 20 MIN: CPT

## 2024-07-23 PROCEDURE — 94640 AIRWAY INHALATION TREATMENT: CPT

## 2024-07-23 PROCEDURE — 80048 BASIC METABOLIC PNL TOTAL CA: CPT

## 2024-07-23 PROCEDURE — 6360000002 HC RX W HCPCS: Performed by: PHYSICIAN ASSISTANT

## 2024-07-23 PROCEDURE — 97165 OT EVAL LOW COMPLEX 30 MIN: CPT

## 2024-07-23 PROCEDURE — 6360000002 HC RX W HCPCS: Performed by: NURSE PRACTITIONER

## 2024-07-23 PROCEDURE — 36415 COLL VENOUS BLD VENIPUNCTURE: CPT

## 2024-07-23 PROCEDURE — 93798 PHYS/QHP OP CAR RHAB W/ECG: CPT

## 2024-07-23 PROCEDURE — 94669 MECHANICAL CHEST WALL OSCILL: CPT

## 2024-07-23 PROCEDURE — 2580000003 HC RX 258: Performed by: PHYSICIAN ASSISTANT

## 2024-07-23 PROCEDURE — 99233 SBSQ HOSP IP/OBS HIGH 50: CPT | Performed by: INTERNAL MEDICINE

## 2024-07-23 PROCEDURE — 82962 GLUCOSE BLOOD TEST: CPT

## 2024-07-23 PROCEDURE — 97530 THERAPEUTIC ACTIVITIES: CPT

## 2024-07-23 PROCEDURE — 2140000000 HC CCU INTERMEDIATE R&B

## 2024-07-23 RX ORDER — AMIODARONE HYDROCHLORIDE 200 MG/1
400 TABLET ORAL 3 TIMES DAILY
Status: DISCONTINUED | OUTPATIENT
Start: 2024-07-23 | End: 2024-07-25 | Stop reason: HOSPADM

## 2024-07-23 RX ORDER — METOCLOPRAMIDE HYDROCHLORIDE 5 MG/ML
10 INJECTION INTRAMUSCULAR; INTRAVENOUS EVERY 6 HOURS
Status: DISCONTINUED | OUTPATIENT
Start: 2024-07-23 | End: 2024-07-25

## 2024-07-23 RX ORDER — ALBUMIN (HUMAN) 12.5 G/50ML
50 SOLUTION INTRAVENOUS ONCE
Status: COMPLETED | OUTPATIENT
Start: 2024-07-23 | End: 2024-07-23

## 2024-07-23 RX ORDER — SIMETHICONE 80 MG
80 TABLET,CHEWABLE ORAL EVERY 6 HOURS PRN
Status: DISCONTINUED | OUTPATIENT
Start: 2024-07-23 | End: 2024-07-25 | Stop reason: HOSPADM

## 2024-07-23 RX ADMIN — ASPIRIN 81 MG: 81 TABLET, COATED ORAL at 09:05

## 2024-07-23 RX ADMIN — METOCLOPRAMIDE 10 MG: 5 INJECTION, SOLUTION INTRAMUSCULAR; INTRAVENOUS at 09:06

## 2024-07-23 RX ADMIN — HYDRALAZINE HYDROCHLORIDE 25 MG: 25 TABLET ORAL at 23:47

## 2024-07-23 RX ADMIN — HYDROCODONE BITARTRATE AND ACETAMINOPHEN 2 TABLET: 5; 325 TABLET ORAL at 02:46

## 2024-07-23 RX ADMIN — HYDROCODONE BITARTRATE AND ACETAMINOPHEN 2 TABLET: 5; 325 TABLET ORAL at 21:13

## 2024-07-23 RX ADMIN — ALBUMIN (HUMAN) 50 G: 0.25 INJECTION, SOLUTION INTRAVENOUS at 11:51

## 2024-07-23 RX ADMIN — FERROUS SULFATE TAB 325 MG (65 MG ELEMENTAL FE) 325 MG: 325 (65 FE) TAB at 09:05

## 2024-07-23 RX ADMIN — PIPERACILLIN AND TAZOBACTAM 3375 MG: 3; .375 INJECTION, POWDER, LYOPHILIZED, FOR SOLUTION INTRAVENOUS at 09:13

## 2024-07-23 RX ADMIN — Medication 400 MG: at 21:06

## 2024-07-23 RX ADMIN — CARVEDILOL 3.12 MG: 3.12 TABLET, FILM COATED ORAL at 09:05

## 2024-07-23 RX ADMIN — HYDRALAZINE HYDROCHLORIDE 25 MG: 25 TABLET ORAL at 15:11

## 2024-07-23 RX ADMIN — AMIODARONE HYDROCHLORIDE 400 MG: 200 TABLET ORAL at 15:11

## 2024-07-23 RX ADMIN — PANTOPRAZOLE SODIUM 40 MG: 40 TABLET, DELAYED RELEASE ORAL at 05:51

## 2024-07-23 RX ADMIN — Medication 400 MG: at 09:05

## 2024-07-23 RX ADMIN — IPRATROPIUM BROMIDE AND ALBUTEROL SULFATE 1 DOSE: .5; 2.5 SOLUTION RESPIRATORY (INHALATION) at 16:56

## 2024-07-23 RX ADMIN — FERROUS SULFATE TAB 325 MG (65 MG ELEMENTAL FE) 325 MG: 325 (65 FE) TAB at 16:59

## 2024-07-23 RX ADMIN — ENOXAPARIN SODIUM 40 MG: 100 INJECTION SUBCUTANEOUS at 09:05

## 2024-07-23 RX ADMIN — POTASSIUM CHLORIDE 20 MEQ: 1500 TABLET, EXTENDED RELEASE ORAL at 09:05

## 2024-07-23 RX ADMIN — HYDRALAZINE HYDROCHLORIDE 25 MG: 25 TABLET ORAL at 05:51

## 2024-07-23 RX ADMIN — SIMETHICONE 80 MG: 80 TABLET, CHEWABLE ORAL at 09:05

## 2024-07-23 RX ADMIN — POLYETHYLENE GLYCOL 3350 17 G: 17 POWDER, FOR SOLUTION ORAL at 09:05

## 2024-07-23 RX ADMIN — INSULIN LISPRO 2 UNITS: 100 INJECTION, SOLUTION INTRAVENOUS; SUBCUTANEOUS at 11:49

## 2024-07-23 RX ADMIN — FOLIC ACID 1 MG: 1 TABLET ORAL at 09:05

## 2024-07-23 RX ADMIN — AMIODARONE HYDROCHLORIDE 400 MG: 200 TABLET ORAL at 21:06

## 2024-07-23 RX ADMIN — IPRATROPIUM BROMIDE AND ALBUTEROL SULFATE 1 DOSE: .5; 2.5 SOLUTION RESPIRATORY (INHALATION) at 20:15

## 2024-07-23 RX ADMIN — AMIODARONE HYDROCHLORIDE 0.5 MG/MIN: 50 INJECTION, SOLUTION INTRAVENOUS at 09:20

## 2024-07-23 RX ADMIN — Medication 500 MG: at 21:06

## 2024-07-23 RX ADMIN — MUPIROCIN: 20 OINTMENT TOPICAL at 21:06

## 2024-07-23 RX ADMIN — PIPERACILLIN AND TAZOBACTAM 3375 MG: 3; .375 INJECTION, POWDER, LYOPHILIZED, FOR SOLUTION INTRAVENOUS at 00:20

## 2024-07-23 RX ADMIN — HYDROCODONE BITARTRATE AND ACETAMINOPHEN 2 TABLET: 5; 325 TABLET ORAL at 15:13

## 2024-07-23 RX ADMIN — CARVEDILOL 3.12 MG: 3.12 TABLET, FILM COATED ORAL at 16:59

## 2024-07-23 RX ADMIN — PIPERACILLIN AND TAZOBACTAM 3375 MG: 3; .375 INJECTION, POWDER, LYOPHILIZED, FOR SOLUTION INTRAVENOUS at 17:01

## 2024-07-23 RX ADMIN — AMLODIPINE BESYLATE 5 MG: 5 TABLET ORAL at 09:06

## 2024-07-23 RX ADMIN — METOCLOPRAMIDE 10 MG: 5 INJECTION, SOLUTION INTRAMUSCULAR; INTRAVENOUS at 21:06

## 2024-07-23 RX ADMIN — METOCLOPRAMIDE 10 MG: 5 INJECTION, SOLUTION INTRAMUSCULAR; INTRAVENOUS at 15:11

## 2024-07-23 RX ADMIN — Medication 500 MG: at 09:05

## 2024-07-23 RX ADMIN — ATORVASTATIN CALCIUM 40 MG: 40 TABLET, FILM COATED ORAL at 09:05

## 2024-07-23 RX ADMIN — IPRATROPIUM BROMIDE AND ALBUTEROL SULFATE 1 DOSE: .5; 2.5 SOLUTION RESPIRATORY (INHALATION) at 12:59

## 2024-07-23 RX ADMIN — MUPIROCIN: 20 OINTMENT TOPICAL at 09:06

## 2024-07-23 RX ADMIN — HYDROCODONE BITARTRATE AND ACETAMINOPHEN 2 TABLET: 5; 325 TABLET ORAL at 09:04

## 2024-07-23 ASSESSMENT — PAIN DESCRIPTION - LOCATION
LOCATION: CHEST
LOCATION: STERNUM
LOCATION: STERNUM
LOCATION: CHEST;INCISION

## 2024-07-23 ASSESSMENT — PAIN SCALES - GENERAL
PAINLEVEL_OUTOF10: 7
PAINLEVEL_OUTOF10: 7
PAINLEVEL_OUTOF10: 3
PAINLEVEL_OUTOF10: 8
PAINLEVEL_OUTOF10: 7

## 2024-07-23 ASSESSMENT — PAIN DESCRIPTION - ORIENTATION
ORIENTATION: MID

## 2024-07-23 ASSESSMENT — PAIN DESCRIPTION - DESCRIPTORS
DESCRIPTORS: ACHING;DISCOMFORT;SORE
DESCRIPTORS: ACHING;DISCOMFORT;STABBING

## 2024-07-23 ASSESSMENT — PAIN DESCRIPTION - PAIN TYPE: TYPE: SURGICAL PAIN

## 2024-07-23 ASSESSMENT — PAIN DESCRIPTION - FREQUENCY: FREQUENCY: INTERMITTENT

## 2024-07-23 ASSESSMENT — PAIN DESCRIPTION - ONSET: ONSET: GRADUAL

## 2024-07-23 NOTE — PROGRESS NOTES
Physical Therapy  Physical Therapy Initial Assessment     Name: Binh Erazo  : 1970  MRN: [id number]      Date of Service: 2024    Evaluating PT:  Mark Zavala PT, DPT [id number]    Room #:  6512/6512-A  Diagnosis:  Severe mitral regurgitation [I34.0]  PMHx/PSHx:    Past Medical History:   Diagnosis Date    Hypertension      Procedure/Surgery:   Sternotomy/CABG x 3 (LIMA-LAD, SVG-Diag, SVG-RCA)/Complex mitral valve repair by medial commissuroplasty and 30mm Medtronic Future band/Tricuspid valve repair with 28mm Medtronic TriAd band/SATHISH exclusion with 45mm AtriClip/EVH RLE/Rigid internal fixation of the sternum with KLS plates x 2   Precautions:  Falls, sternal  Equipment Needs:  None    SUBJECTIVE:    Pt lives with girlfriend in a 2 story home with 4 step(s) to enter and no rail(s).   First floor setup.  Pt ambulated without device and was independent PTA.    OBJECTIVE:   Initial Evaluation  Date: 24 Treatment Short Term/ Long Term   Goals   AM-PAC 6 Clicks      Was pt agreeable to Eval/treatment? yes     Does pt have pain? Surgical pain but no rating given     Bed Mobility  Rolling: NT  Supine to sit: NT  Sit to supine: NT  Scooting: NT  Wai   Transfers Sit to stand: Wai  Stand to sit: Wai  Stand pivot: Wai no device  Independent   Ambulation   75 feet x 3 reps with Wai no device  >400 feet Independently   Stair negotiation: ascended and descended NT  >4 steps with no rail Wai   ROM BUE:  WFL  BLE:  WFL     Strength BUE:  WFL  BLE:  4+/5  Increase by 1/3 MMT grade   Balance Sitting EOB:  NT  Dynamic Standing:  Wai no device  Sitting EOB:  Independent  Dynamic Standing:  Independent     Pt is A & O x 4  Sensation:  no reported paresthesias  Edema:  none    Therapeutic Exercises:  NA    Patient education  Pt educated on safety    Patient response to education:   Pt verbalized understanding Pt demonstrated skill Pt requires further education in this area   yes yes yes

## 2024-07-23 NOTE — PROGRESS NOTES
empiric zosyn (day 2)        DVT prophylaxis:  --continue bilateral knee high SHAUN hose  --continue PCDs  --continue progressive ambulation  --continue lovenox for dvt prophylaxis and continue knee high SHAUN hose/pcds/progressive ambulation        Dispo: home vs. Rehab pending progression in activity level           This patient's case and care plan was discussed with the attending surgeon

## 2024-07-23 NOTE — PROGRESS NOTES
Inpatient Cardiology Progress note        SUBJECTIVE/OBJECTIVE:   Patient went into A-fib this morning.  Amiodarone started.  Patient denies having chest pain or shortness of breath.  No edema.          PHYSICAL EXAM:   /84   Pulse 87   Temp 97.5 °F (36.4 °C) (Temporal)   Resp 18   Ht 1.702 m (5' 7.01\")   Wt 70.3 kg (155 lb)   SpO2 99%   BMI 24.27 kg/m²    B/P Range last 24 hours: Systolic (24hrs), Av , Min:109 , Max:132    Diastolic (24hrs), Av, Min:66, Max:84      GENERAL: Not in distress.   HEAD: Normocephalic, Atraumatic.   NECK: No JVD. No carotid bruits. No neck masses.?   CARDIOVASCULAR: Irregular rhythm,  normal S1, S2, no MRG    LUNGS: Clear to auscultation bilaterally, no wheezing.?   ABDOMEN: Abdomen is soft and not distended. No tenderness.  EXTREMITIES:There is mild right but no left pedal edema.   MUSCULOSKELETAL: No joint swelling. Normal range of motion?   SKIN: Skins is moist. No ulcers or lesions.   NEUROLOGICAL: Conscious, alert, oriented to time, place and person. No evidence of obvious neurological deficits.?   PSYCHOLOGICAL: Patient is in normal mood.?       Intake/Output Summary (Last 24 hours) at 2024 2254  Last data filed at 2024 1930  Gross per 24 hour   Intake 900 ml   Output 2045 ml   Net -1145 ml       Weight:   Wt Readings from Last 3 Encounters:   24 70.3 kg (155 lb)   24 69.9 kg (154 lb)   24 68 kg (150 lb)       Current Inpatient Medications:   lidocaine 1 % injection  50 mg IntraDERmal Once    piperacillin-tazobactam  3,375 mg IntraVENous Q8H    aspirin  81 mg Oral Daily    enoxaparin  40 mg SubCUTAneous Daily    carvedilol  3.125 mg Oral BID WC    amLODIPine  5 mg Oral Daily    hydrALAZINE  25 mg Oral 3 times per day    bisacodyl  10 mg Rectal Daily    ferrous sulfate  325 mg Oral BID WC    folic acid  1 mg Oral Daily    insulin lispro  0-12 Units SubCUTAneous TID WC    insulin lispro  0-8 Units SubCUTAneous Nightly    vitamin C  prolapse noted. Severe regurgitation with an anterior directed jet.    Left Atrium: Left atrium is dilated. No left atrial appendage thrombus noted.    Interatrial Septum: No evidence of right to left interatrial shunting on bubble study.    - Cardiac cath 4/24/24:  1.  Coronary artery disease  Left main: No significant angiographic  LAD: Focal 70% stenosis just after the first diagonal and before the first septal  branch.  70% apical LAD disease after it wraparound apex 70% ostial stenosis of a moderate-sized first diagonal branch  Intermediate ramus/high first obtuse marginal: No significant angiographic disease  LCx: 70% stenosis before the first obtuse marginal branch 70% ostial stenosis of the obtuse marginal branch long irregular up to 70% mid left circumflex stenosis before the terminal lateral branch.  RCA: Dominant vessel which is excessively tortuous with 50% proximal vessel stenosis and 40% mid vessel disease  2.  Left ventricular end-diastolic pressure = 2 mmHg         ASSESSMENT/PLAN:      53-year-old male with past medical history of CAD and VHD s/p CABG x 3 (LIMA to LAD, SVG-diagonal, SVG-RCA) on 7/19/2024 along with mitral valve repair (30 mm Medtronic Future band) and tricuspid valve repair (28 mm Medtronic triad band) and SATHISH excision with 45 mm AtriClip (7/19/2024), HTN, marijuana, cigar use.    CAD:  s/p CABG x 3 (LIMA to LAD, SVG-diagonal, SVG-RCA) on 7/19/2024  Continue ASA 81 mg daily  Continue atorvastatin 40 mg daily  Coreg 3.125 mg bid  VHD:  mitral valve repair (30 mm Medtronic Future band) and tricuspid valve repair (28 mm Medtronic triad band) and SATHISH excision with 45 mm AtriClip (7/19/2024)  Consider TTE prior to discharge  HTN:  Continue amlodipine 5 mg daily  Continue hydralazine 25 g daily  Coreg as above  Post op A.fib:  Continue Amiodarone drip  Consideration for anticoagulation per CT surgery      Bijan Muse MD  Interventional Cardiology/ Structural heart disease

## 2024-07-23 NOTE — PROGRESS NOTES
Occupational Therapy  OCCUPATIONAL THERAPY INITIAL EVALUATION    [facility]  [address], [place]      Date:2024                                                Patient Name: Binh Erazo  MRN: [id number]  : 1970  Room: [address] [place], [place] [address]    Evaluating OT: [place] OTR/L #[id number]     Referring Provider: Cee Gomes APRN - CNP   Specific Provider Orders/Date: OT eval and treat 24    Diagnosis: Severe mitral regurgitation [I34.0]   Pt admitted to hospital for scheduled surgery     Surgery / Procedure:  Sternotomy/CABG x 3 (LIMA-LAD, SVG-Diag, SVG-RCA)/Complex mitral valve repair by medial commissuroplasty and 30mm Medtronic Future band/Tricuspid valve repair with 28mm Medtronic TriAd band/SATHISH exclusion with 45mm AtriClip/EVH RLE/Rigid internal fixation of the sternum with KLS plates x 2  on 2024      Pertinent Medical History:  has a past medical history of Hypertension.       Precautions:  Fall Risk, sternal precautions, external pacemaker     Assessment of current deficits    [x] Functional mobility  [x]ADLs  [x] Strength               []Cognition    [x] Functional transfers   [x] IADLs         [x] Safety Awareness   [x]Endurance    [] Fine Coordination              [x] Balance      [] Vision/perception   [x]Sensation     []Gross Motor Coordination  [] ROM  [] Delirium                   [] Motor Control     OT PLAN OF CARE   OT POC based on physician orders, patient diagnosis and results of clinical assessment    Frequency/Duration 1-3 days/wk for 2 weeks PRN   Specific OT Treatment Interventions to include:   * Instruction/training on adapted ADL techniques and AE recommendations to increase functional independence within precautions       * Training on energy conservation strategies, correct breathing pattern and techniques to improve independence/tolerance for self-care routine  * Functional transfer/mobility training/DME    UB Dressing Stand by Assist   Modified [place]    LB Dressing Minimal Assist   Modified [place]    Bathing Minimal Assist  Modified [place]    Toileting Minimal Assist   Modified [place]    Bed Mobility  Supine to sit: NT   Sit to supine: NT   Supine to sit: Modified [place]   Sit to supine: Modified [place]    Functional Transfers Minimal Assist   Modified [place]    Functional Mobility Minimal Assist     Ambulated in room and hallway without AD  Modified [place]    Balance Sitting:     Static:  SBA    Dynamic:SBA  Standing: min A     Activity Tolerance F  G   Visual/  Perceptual wfl                  Hand Dominance right   Strength ROM Additional Info:    RUE  Wfl, formal MMT deferred due to sternal precautions  wfl (within sternal precautions) good  and wfl FMC/dexterity noted during ADL tasks     LUE Wfl, formal MMT deferred due to sternal precautions  wfl (within sternal precautions) good  and wfl FMC/dexterity noted during ADL tasks     Sensation: chronic intermittent numbness and tingling R hand  Tone: wfl  Edema:none noted      Comments: Upon arrival patient supine in bed and agreeable to OT Session.   Therapist educated pt on role of OT. At end of session, patient semi-supine in bed with call light and phone within reach, all lines and tubes intact.  Overall patient demonstrated decreased independence and safety during completion of ADL/functional transfer/mobility tasks.  Pt would benefit from continued skilled OT to increase safety and independence with completion of ADL/IADL tasks for functional independence and quality of life.    Treatment: OT treatment provided this date includes: Therapist educated pt on role of OT and sternal precautions.  Therapist facilitated functional transfers (various surfaces; chair and toilet), standing tolerance tasks (addressing posture, balance and activity tolerance) and functional ambulation task without AD - skilled

## 2024-07-23 NOTE — PLAN OF CARE
Problem: Discharge Planning  Goal: Discharge to home or other facility with appropriate resources  7/23/2024 0242 by Mark Lee RN  Outcome: Progressing     Problem: Safety - Adult  Goal: Free from fall injury  7/23/2024 0242 by Mark Lee RN  Outcome: Progressing     Problem: ABCDS Injury Assessment  Goal: Absence of physical injury  7/23/2024 0242 by Mark Lee RN  Outcome: Progressing     Problem: Pain  Goal: Verbalizes/displays adequate comfort level or baseline comfort level  7/23/2024 0242 by Mark Lee RN  Outcome: Progressing

## 2024-07-23 NOTE — PATIENT CARE CONFERENCE
[facility] Quality Flow/Interdisciplinary Rounds Progress Note        Quality Flow Rounds held on July 23, 2024    Disciplines Attending:  Bedside Nurse, , , and Nursing Unit Leadership    Binh Erazo was admitted on 7/19/2024  5:01 AM    Anticipated Discharge Date:       Disposition:    Armani Score:  Armani Scale Score: 19    Readmission Risk              Risk of Unplanned Readmission:  15           Discussed patient goal for the day, patient clinical progression, and barriers to discharge.  The following Goal(s) of the Day/Commitment(s) have been identified:    report labs/diagnostics     Grace Morales RN  July 23, 2024

## 2024-07-24 LAB
ANION GAP SERPL CALCULATED.3IONS-SCNC: 8 MMOL/L (ref 7–16)
BASOPHILS # BLD: 0.05 K/UL (ref 0–0.2)
BASOPHILS NFR BLD: 0 % (ref 0–2)
BUN SERPL-MCNC: 28 MG/DL (ref 6–20)
CALCIUM SERPL-MCNC: 8.3 MG/DL (ref 8.6–10.2)
CHLORIDE SERPL-SCNC: 103 MMOL/L (ref 98–107)
CO2 SERPL-SCNC: 24 MMOL/L (ref 22–29)
CREAT SERPL-MCNC: 0.9 MG/DL (ref 0.7–1.2)
EOSINOPHIL # BLD: 0.41 K/UL (ref 0.05–0.5)
EOSINOPHILS RELATIVE PERCENT: 3 % (ref 0–6)
ERYTHROCYTE [DISTWIDTH] IN BLOOD BY AUTOMATED COUNT: 14.4 % (ref 11.5–15)
GFR, ESTIMATED: >90 ML/MIN/1.73M2
GLUCOSE BLD-MCNC: 127 MG/DL (ref 74–99)
GLUCOSE BLD-MCNC: 129 MG/DL (ref 74–99)
GLUCOSE BLD-MCNC: 136 MG/DL (ref 74–99)
GLUCOSE BLD-MCNC: 153 MG/DL (ref 74–99)
GLUCOSE SERPL-MCNC: 103 MG/DL (ref 74–99)
HCT VFR BLD AUTO: 20.1 % (ref 37–54)
HCT VFR BLD AUTO: 26.6 % (ref 37–54)
HCT VFR BLD AUTO: 26.6 % (ref 37–54)
HGB BLD-MCNC: 6.7 G/DL (ref 12.5–16.5)
HGB BLD-MCNC: 8.5 G/DL (ref 12.5–16.5)
HGB BLD-MCNC: 8.9 G/DL (ref 12.5–16.5)
IMM GRANULOCYTES # BLD AUTO: 0.09 K/UL (ref 0–0.58)
IMM GRANULOCYTES NFR BLD: 1 % (ref 0–5)
LYMPHOCYTES NFR BLD: 1.69 K/UL (ref 1.5–4)
LYMPHOCYTES RELATIVE PERCENT: 14 % (ref 20–42)
MAGNESIUM SERPL-MCNC: 2.5 MG/DL (ref 1.6–2.6)
MCH RBC QN AUTO: 30.3 PG (ref 26–35)
MCHC RBC AUTO-ENTMCNC: 33.3 G/DL (ref 32–34.5)
MCV RBC AUTO: 91 FL (ref 80–99.9)
MONOCYTES NFR BLD: 1.27 K/UL (ref 0.1–0.95)
MONOCYTES NFR BLD: 10 % (ref 2–12)
NEUTROPHILS NFR BLD: 72 % (ref 43–80)
NEUTS SEG NFR BLD: 8.95 K/UL (ref 1.8–7.3)
PLATELET # BLD AUTO: 184 K/UL (ref 130–450)
PMV BLD AUTO: 11 FL (ref 7–12)
POTASSIUM SERPL-SCNC: 3.6 MMOL/L (ref 3.5–5)
RBC # BLD AUTO: 2.21 M/UL (ref 3.8–5.8)
SODIUM SERPL-SCNC: 135 MMOL/L (ref 132–146)
WBC OTHER # BLD: 12.5 K/UL (ref 4.5–11.5)

## 2024-07-24 PROCEDURE — 85014 HEMATOCRIT: CPT

## 2024-07-24 PROCEDURE — 99233 SBSQ HOSP IP/OBS HIGH 50: CPT | Performed by: INTERNAL MEDICINE

## 2024-07-24 PROCEDURE — 6370000000 HC RX 637 (ALT 250 FOR IP): Performed by: NURSE PRACTITIONER

## 2024-07-24 PROCEDURE — 36430 TRANSFUSION BLD/BLD COMPNT: CPT

## 2024-07-24 PROCEDURE — 86850 RBC ANTIBODY SCREEN: CPT

## 2024-07-24 PROCEDURE — 85025 COMPLETE CBC W/AUTO DIFF WBC: CPT

## 2024-07-24 PROCEDURE — 93798 PHYS/QHP OP CAR RHAB W/ECG: CPT

## 2024-07-24 PROCEDURE — 86923 COMPATIBILITY TEST ELECTRIC: CPT

## 2024-07-24 PROCEDURE — 6370000000 HC RX 637 (ALT 250 FOR IP)

## 2024-07-24 PROCEDURE — 85018 HEMOGLOBIN: CPT

## 2024-07-24 PROCEDURE — 94669 MECHANICAL CHEST WALL OSCILL: CPT

## 2024-07-24 PROCEDURE — P9016 RBC LEUKOCYTES REDUCED: HCPCS

## 2024-07-24 PROCEDURE — 80048 BASIC METABOLIC PNL TOTAL CA: CPT

## 2024-07-24 PROCEDURE — 6360000002 HC RX W HCPCS: Performed by: NURSE PRACTITIONER

## 2024-07-24 PROCEDURE — 82962 GLUCOSE BLOOD TEST: CPT

## 2024-07-24 PROCEDURE — 2580000003 HC RX 258: Performed by: NURSE PRACTITIONER

## 2024-07-24 PROCEDURE — 2140000000 HC CCU INTERMEDIATE R&B

## 2024-07-24 PROCEDURE — 36415 COLL VENOUS BLD VENIPUNCTURE: CPT

## 2024-07-24 PROCEDURE — 86901 BLOOD TYPING SEROLOGIC RH(D): CPT

## 2024-07-24 PROCEDURE — 83735 ASSAY OF MAGNESIUM: CPT

## 2024-07-24 PROCEDURE — 94640 AIRWAY INHALATION TREATMENT: CPT

## 2024-07-24 PROCEDURE — 86900 BLOOD TYPING SEROLOGIC ABO: CPT

## 2024-07-24 RX ORDER — POTASSIUM CHLORIDE 750 MG/1
10 TABLET, EXTENDED RELEASE ORAL DAILY
Qty: 30 TABLET | Refills: 1 | Status: SHIPPED | OUTPATIENT
Start: 2024-07-24

## 2024-07-24 RX ORDER — SODIUM CHLORIDE 9 MG/ML
INJECTION, SOLUTION INTRAVENOUS PRN
Status: DISCONTINUED | OUTPATIENT
Start: 2024-07-24 | End: 2024-07-25 | Stop reason: HOSPADM

## 2024-07-24 RX ORDER — FUROSEMIDE 20 MG/1
20 TABLET ORAL DAILY
Qty: 30 TABLET | Refills: 1 | Status: SHIPPED | OUTPATIENT
Start: 2024-07-24

## 2024-07-24 RX ORDER — ASCORBIC ACID 500 MG
500 TABLET ORAL 2 TIMES DAILY
Qty: 60 TABLET | Refills: 0 | Status: SHIPPED | OUTPATIENT
Start: 2024-07-24 | End: 2024-08-23

## 2024-07-24 RX ORDER — AMLODIPINE BESYLATE 5 MG/1
5 TABLET ORAL DAILY
Qty: 30 TABLET | Refills: 3 | Status: SHIPPED | OUTPATIENT
Start: 2024-07-24

## 2024-07-24 RX ORDER — FUROSEMIDE 10 MG/ML
20 INJECTION INTRAMUSCULAR; INTRAVENOUS ONCE
Status: COMPLETED | OUTPATIENT
Start: 2024-07-24 | End: 2024-07-24

## 2024-07-24 RX ORDER — AMIODARONE HYDROCHLORIDE 200 MG/1
400 TABLET ORAL SEE ADMIN INSTRUCTIONS
Qty: 56 TABLET | Refills: 0 | Status: SHIPPED | OUTPATIENT
Start: 2024-07-24

## 2024-07-24 RX ORDER — FERROUS SULFATE 325(65) MG
325 TABLET ORAL 2 TIMES DAILY WITH MEALS
Qty: 60 TABLET | Refills: 0 | Status: SHIPPED | OUTPATIENT
Start: 2024-07-24 | End: 2024-08-23

## 2024-07-24 RX ORDER — FOLIC ACID 1 MG/1
1 TABLET ORAL DAILY
Qty: 30 TABLET | Refills: 0 | Status: SHIPPED | OUTPATIENT
Start: 2024-07-24 | End: 2024-08-23

## 2024-07-24 RX ORDER — PANTOPRAZOLE SODIUM 40 MG/1
40 TABLET, DELAYED RELEASE ORAL
Qty: 14 TABLET | Refills: 0 | Status: SHIPPED | OUTPATIENT
Start: 2024-07-25 | End: 2024-08-08

## 2024-07-24 RX ORDER — LANOLIN ALCOHOL/MO/W.PET/CERES
400 CREAM (GRAM) TOPICAL 2 TIMES DAILY
Qty: 28 TABLET | Refills: 0 | Status: SHIPPED | OUTPATIENT
Start: 2024-07-24 | End: 2024-08-07

## 2024-07-24 RX ORDER — HYDRALAZINE HYDROCHLORIDE 25 MG/1
25 TABLET, FILM COATED ORAL EVERY 8 HOURS SCHEDULED
Qty: 90 TABLET | Refills: 3 | Status: SHIPPED | OUTPATIENT
Start: 2024-07-24

## 2024-07-24 RX ORDER — ASPIRIN 81 MG/1
81 TABLET ORAL DAILY
Qty: 90 TABLET | Refills: 5 | Status: SHIPPED | OUTPATIENT
Start: 2024-07-24

## 2024-07-24 RX ORDER — AMOXICILLIN AND CLAVULANATE POTASSIUM 875; 125 MG/1; MG/1
1 TABLET, FILM COATED ORAL 2 TIMES DAILY
Qty: 14 TABLET | Refills: 0 | Status: SHIPPED | OUTPATIENT
Start: 2024-07-24 | End: 2024-07-31

## 2024-07-24 RX ORDER — FUROSEMIDE 20 MG/1
20 TABLET ORAL DAILY
Status: DISCONTINUED | OUTPATIENT
Start: 2024-07-24 | End: 2024-07-25 | Stop reason: HOSPADM

## 2024-07-24 RX ADMIN — HYDRALAZINE HYDROCHLORIDE 25 MG: 25 TABLET ORAL at 06:41

## 2024-07-24 RX ADMIN — HYDROCODONE BITARTRATE AND ACETAMINOPHEN 2 TABLET: 5; 325 TABLET ORAL at 21:11

## 2024-07-24 RX ADMIN — METOCLOPRAMIDE 10 MG: 5 INJECTION, SOLUTION INTRAMUSCULAR; INTRAVENOUS at 08:31

## 2024-07-24 RX ADMIN — FUROSEMIDE 20 MG: 20 TABLET ORAL at 08:51

## 2024-07-24 RX ADMIN — Medication 400 MG: at 08:28

## 2024-07-24 RX ADMIN — HYDROCODONE BITARTRATE AND ACETAMINOPHEN 2 TABLET: 5; 325 TABLET ORAL at 01:32

## 2024-07-24 RX ADMIN — PANTOPRAZOLE SODIUM 40 MG: 40 TABLET, DELAYED RELEASE ORAL at 06:41

## 2024-07-24 RX ADMIN — METOCLOPRAMIDE 10 MG: 5 INJECTION, SOLUTION INTRAMUSCULAR; INTRAVENOUS at 21:08

## 2024-07-24 RX ADMIN — METOCLOPRAMIDE 10 MG: 5 INJECTION, SOLUTION INTRAMUSCULAR; INTRAVENOUS at 13:37

## 2024-07-24 RX ADMIN — IPRATROPIUM BROMIDE AND ALBUTEROL SULFATE 1 DOSE: .5; 2.5 SOLUTION RESPIRATORY (INHALATION) at 16:38

## 2024-07-24 RX ADMIN — HYDRALAZINE HYDROCHLORIDE 25 MG: 25 TABLET ORAL at 13:37

## 2024-07-24 RX ADMIN — AMLODIPINE BESYLATE 5 MG: 5 TABLET ORAL at 08:28

## 2024-07-24 RX ADMIN — APIXABAN 5 MG: 5 TABLET, FILM COATED ORAL at 21:07

## 2024-07-24 RX ADMIN — FOLIC ACID 1 MG: 1 TABLET ORAL at 08:30

## 2024-07-24 RX ADMIN — PIPERACILLIN AND TAZOBACTAM 3375 MG: 3; .375 INJECTION, POWDER, LYOPHILIZED, FOR SOLUTION INTRAVENOUS at 08:33

## 2024-07-24 RX ADMIN — METOCLOPRAMIDE 10 MG: 5 INJECTION, SOLUTION INTRAMUSCULAR; INTRAVENOUS at 03:15

## 2024-07-24 RX ADMIN — CARVEDILOL 3.12 MG: 3.12 TABLET, FILM COATED ORAL at 17:11

## 2024-07-24 RX ADMIN — ATORVASTATIN CALCIUM 40 MG: 40 TABLET, FILM COATED ORAL at 08:28

## 2024-07-24 RX ADMIN — HYDROCODONE BITARTRATE AND ACETAMINOPHEN 2 TABLET: 5; 325 TABLET ORAL at 08:29

## 2024-07-24 RX ADMIN — Medication 400 MG: at 21:07

## 2024-07-24 RX ADMIN — CARVEDILOL 3.12 MG: 3.12 TABLET, FILM COATED ORAL at 08:30

## 2024-07-24 RX ADMIN — PIPERACILLIN AND TAZOBACTAM 3375 MG: 3; .375 INJECTION, POWDER, LYOPHILIZED, FOR SOLUTION INTRAVENOUS at 00:20

## 2024-07-24 RX ADMIN — IPRATROPIUM BROMIDE AND ALBUTEROL SULFATE 1 DOSE: .5; 2.5 SOLUTION RESPIRATORY (INHALATION) at 20:19

## 2024-07-24 RX ADMIN — HYDRALAZINE HYDROCHLORIDE 25 MG: 25 TABLET ORAL at 21:08

## 2024-07-24 RX ADMIN — AMIODARONE HYDROCHLORIDE 400 MG: 200 TABLET ORAL at 08:29

## 2024-07-24 RX ADMIN — AMIODARONE HYDROCHLORIDE 400 MG: 200 TABLET ORAL at 21:07

## 2024-07-24 RX ADMIN — Medication 500 MG: at 21:08

## 2024-07-24 RX ADMIN — POTASSIUM CHLORIDE 20 MEQ: 1500 TABLET, EXTENDED RELEASE ORAL at 08:29

## 2024-07-24 RX ADMIN — FERROUS SULFATE TAB 325 MG (65 MG ELEMENTAL FE) 325 MG: 325 (65 FE) TAB at 08:30

## 2024-07-24 RX ADMIN — AMIODARONE HYDROCHLORIDE 400 MG: 200 TABLET ORAL at 15:27

## 2024-07-24 RX ADMIN — PIPERACILLIN AND TAZOBACTAM 3375 MG: 3; .375 INJECTION, POWDER, LYOPHILIZED, FOR SOLUTION INTRAVENOUS at 18:09

## 2024-07-24 RX ADMIN — FUROSEMIDE 20 MG: 10 INJECTION, SOLUTION INTRAMUSCULAR; INTRAVENOUS at 15:27

## 2024-07-24 RX ADMIN — ASPIRIN 81 MG: 81 TABLET, COATED ORAL at 08:28

## 2024-07-24 RX ADMIN — INSULIN LISPRO 2 UNITS: 100 INJECTION, SOLUTION INTRAVENOUS; SUBCUTANEOUS at 13:37

## 2024-07-24 RX ADMIN — Medication 500 MG: at 08:30

## 2024-07-24 RX ADMIN — FERROUS SULFATE TAB 325 MG (65 MG ELEMENTAL FE) 325 MG: 325 (65 FE) TAB at 17:11

## 2024-07-24 ASSESSMENT — PAIN SCALES - GENERAL
PAINLEVEL_OUTOF10: 7
PAINLEVEL_OUTOF10: 8
PAINLEVEL_OUTOF10: 6
PAINLEVEL_OUTOF10: 10
PAINLEVEL_OUTOF10: 3

## 2024-07-24 ASSESSMENT — PAIN DESCRIPTION - LOCATION
LOCATION: INCISION
LOCATION: CHEST
LOCATION: INCISION;LEG

## 2024-07-24 ASSESSMENT — PAIN DESCRIPTION - PAIN TYPE: TYPE: ACUTE PAIN;SURGICAL PAIN

## 2024-07-24 ASSESSMENT — PAIN - FUNCTIONAL ASSESSMENT
PAIN_FUNCTIONAL_ASSESSMENT: ACTIVITIES ARE NOT PREVENTED

## 2024-07-24 ASSESSMENT — PAIN DESCRIPTION - DESCRIPTORS
DESCRIPTORS: ACHING;DISCOMFORT;SORE;TENDER
DESCRIPTORS: ACHING;DISCOMFORT;SORE
DESCRIPTORS: ACHING;SORE;TENDER

## 2024-07-24 ASSESSMENT — PAIN DESCRIPTION - ONSET: ONSET: PROGRESSIVE

## 2024-07-24 ASSESSMENT — PAIN DESCRIPTION - ORIENTATION
ORIENTATION: MID
ORIENTATION: MID

## 2024-07-24 ASSESSMENT — PAIN SCALES - WONG BAKER: WONGBAKER_NUMERICALRESPONSE: NO HURT

## 2024-07-24 ASSESSMENT — PAIN DESCRIPTION - FREQUENCY: FREQUENCY: INTERMITTENT

## 2024-07-24 NOTE — PLAN OF CARE
Problem: Discharge Planning  Goal: Discharge to home or other facility with appropriate resources  Outcome: Progressing     Problem: Safety - Adult  Goal: Free from fall injury  Outcome: Progressing     Problem: ABCDS Injury Assessment  Goal: Absence of physical injury  Outcome: Progressing     Problem: Pain  Goal: Verbalizes/displays adequate comfort level or baseline comfort level  Outcome: Progressing     Problem: Neurosensory - Adult  Goal: Achieves stable or improved neurological status  Outcome: Progressing     Problem: Respiratory - Adult  Goal: Achieves optimal ventilation and oxygenation  Outcome: Progressing     Problem: Cardiovascular - Adult  Goal: Maintains optimal cardiac output and hemodynamic stability  Outcome: Progressing     Problem: Skin/Tissue Integrity - Adult  Goal: Incisions, wounds, or drain sites healing without S/S of infection  Outcome: Progressing     Problem: Musculoskeletal - Adult  Goal: Return mobility to safest level of function  Outcome: Progressing     Problem: Gastrointestinal - Adult  Goal: Minimal or absence of nausea and vomiting  Outcome: Progressing     Problem: Genitourinary - Adult  Goal: Absence of urinary retention  Outcome: Progressing     Problem: Infection - Adult  Goal: Absence of infection at discharge  Outcome: Progressing     Problem: Skin/Tissue Integrity  Goal: Absence of new skin breakdown  Description: 1.  Monitor for areas of redness and/or skin breakdown  2.  Assess vascular access sites hourly  3.  Every 4-6 hours minimum:  Change oxygen saturation probe site  4.  Every 4-6 hours:  If on nasal continuous positive airway pressure, respiratory therapy assess nares and determine need for appliance change or resting period.  Outcome: Progressing     Problem: Nutrition Deficit:  Goal: Optimize nutritional status  Outcome: Progressing

## 2024-07-24 NOTE — PROGRESS NOTES
CLINICAL PHARMACY NOTE: MEDS TO BEDS    Total # of Prescriptions Filled: 13   The following medications were delivered to the patient:  Amlodipine 5  Pantoprazole 40  Furosemide 20  Eliquis 5  Amiodarone 200  Aspirin 81  Hydralazine 25  Vitamin c 500  Magnesium 400  Ferosul 325  Folic acid 1  Amoxicillin-pot clav 875-125  Potassium chloride er 10    Additional Documentation:  Pts daughter picked up in pharmacy

## 2024-07-24 NOTE — CARE COORDINATION
Transition of care update: POD#5 CABG x 3/ Complex mitral valve repair by medial commissuroplasty and 30mm Medtronic Future band/Tricuspid valve repair with 28mm Medtronic TriAd band. Wires cut. Hgb 6.7 and other labs noted. On RA. Transfuse 1 unit PRBCs. Pt ambulated 400ft with cardiac rehab. Met with pt in room. Up in chair. Plan remains to home when medically ready.  Pt lives with his girlfriend, Hiral. Pt said Hiral or a family member will be with him 24/7 after dc. [facility] is setup. No other needs were voiced for home. Cm/sw will follow.

## 2024-07-24 NOTE — PROGRESS NOTES
[facility] Quality Flow/Interdisciplinary Rounds Progress Note        Quality Flow Rounds held on July 24, 2024    Disciplines Attending:  Bedside Nurse, , , and Nursing Unit Leadership    Binh Erazo was admitted on 7/19/2024  5:01 AM    Anticipated Discharge Date:       Disposition:    Armani Score:  Armani Scale Score: 19    Readmission Risk              Risk of Unplanned Readmission:  18           Discussed patient goal for the day, patient clinical progression, and barriers to discharge.  The following Goal(s) of the Day/Commitment(s) have been identified:  Diagnostics - Report Results and Labs - Report Results      Taisha Khan RN  July 24, 2024

## 2024-07-24 NOTE — PROGRESS NOTES
2 Rns verified blood product to pt. VSS at time of initiation. Consent verified. Blood transfusion initiated. Blood reaching vein at 1335. RN stayed with pt for first 15 minutes. No reaction noted.Brooke Nru RN

## 2024-07-24 NOTE — PROGRESS NOTES
Inpatient Cardiology Progress note        SUBJECTIVE/OBJECTIVE:   Having fatigue, receiving red blood cells transfusion today.     PHYSICAL EXAM:   BP (!) 111/59   Pulse 62   Temp 97.6 °F (36.4 °C) (Temporal)   Resp 18   Ht 1.702 m (5' 7.01\")   Wt 69.9 kg (154 lb 3.2 oz)   SpO2 98%   BMI 24.15 kg/m²    B/P Range last 24 hours: Systolic (24hrs), Av , Min:101 , Max:127    Diastolic (24hrs), Av, Min:56, Max:78      GENERAL: Not in distress.   HEAD: Normocephalic, Atraumatic.   NECK: No JVD. No carotid bruits. No neck masses.?   CARDIOVASCULAR: Irregular rhythm,  normal S1, S2, no MRG    LUNGS: Clear to auscultation bilaterally, no wheezing.?   ABDOMEN: Abdomen is soft and not distended. No tenderness.  EXTREMITIES:There is mild right but no left pedal edema.   MUSCULOSKELETAL: No joint swelling. Normal range of motion?   SKIN: Skins is moist. No ulcers or lesions.   NEUROLOGICAL: Conscious, alert, oriented to time, place and person. No evidence of obvious neurological deficits.?   PSYCHOLOGICAL: Patient is in normal mood.?       Intake/Output Summary (Last 24 hours) at 2024  Last data filed at 2024 1835  Gross per 24 hour   Intake 368 ml   Output 2050 ml   Net -1682 ml       Weight:   Wt Readings from Last 3 Encounters:   24 69.9 kg (154 lb 3.2 oz)   24 69.9 kg (154 lb)   24 68 kg (150 lb)       Current Inpatient Medications:   furosemide  20 mg Oral Daily    apixaban  5 mg Oral BID    metoclopramide  10 mg IntraVENous Q6H    amiodarone  400 mg Oral TID    lidocaine 1 % injection  50 mg IntraDERmal Once    piperacillin-tazobactam  3,375 mg IntraVENous Q8H    aspirin  81 mg Oral Daily    carvedilol  3.125 mg Oral BID WC    amLODIPine  5 mg Oral Daily    hydrALAZINE  25 mg Oral 3 times per day    bisacodyl  10 mg Rectal Daily    ferrous sulfate  325 mg Oral BID WC    folic acid  1 mg Oral Daily    insulin lispro  0-12 Units SubCUTAneous TID WC    insulin lispro  0-8  Valve: Valve repaired by commissurotomy and annular band.    Tricuspid Valve: Repaired by annular ring.    Left Atrium: Left atrium is severely dilated.    Pericardium: Trivial pericardial effusion present. No indication of cardiac tamponade.    Image quality is adequate.    - ECG 7/12/2024: Personally reviewed sinus bradycardia, septal infarct      -[place] 4/28/2024:    Left Ventricle: Normal left ventricular systolic function. EF by visual approximation is 60%. Left ventricle size is normal.    Right Ventricle: Right ventricle size is normal. Normal systolic function.    Mitral Valve: Posterior leaflet prolapse noted. Severe regurgitation with an anterior directed jet.    Left Atrium: Left atrium is dilated. No left atrial appendage thrombus noted.    Interatrial Septum: No evidence of right to left interatrial shunting on bubble study.    - Cardiac cath 4/24/24:  1.  Coronary artery disease  Left main: No significant angiographic  LAD: Focal 70% stenosis just after the first diagonal and before the first septal  branch.  70% apical LAD disease after it wraparound apex 70% ostial stenosis of a moderate-sized first diagonal branch  Intermediate ramus/high first obtuse marginal: No significant angiographic disease  LCx: 70% stenosis before the first obtuse marginal branch 70% ostial stenosis of the obtuse marginal branch long irregular up to 70% mid left circumflex stenosis before the terminal lateral branch.  RCA: Dominant vessel which is excessively tortuous with 50% proximal vessel stenosis and 40% mid vessel disease  2.  Left ventricular end-diastolic pressure = 2 mmHg     -TTE 7/23/2024:    Left Ventricle: Normal left ventricular systolic function with a visually estimated EF of 55 - 60%. Left ventricle size is normal. Mildly increased wall thickness. Normal wall motion.    Right Ventricle: Right ventricle is mildly dilated. Reduced systolic function.    Mitral Valve: Valve repaired by commissurotomy and

## 2024-07-24 NOTE — PLAN OF CARE
Problem: Discharge Planning  Goal: Discharge to home or other facility with appropriate resources  7/24/2024 1849 by Brooke Nur RN  Outcome: Progressing  7/24/2024 1632 by Brooke Nur RN  Outcome: Progressing     Problem: Safety - Adult  Goal: Free from fall injury  7/24/2024 1849 by Brooke Nur RN  Outcome: Progressing  7/24/2024 1632 by Brooke Nur RN  Outcome: Progressing     Problem: ABCDS Injury Assessment  Goal: Absence of physical injury  7/24/2024 1849 by Brooke Nur RN  Outcome: Progressing  7/24/2024 1632 by Brooke uNr RN  Outcome: Progressing     Problem: Pain  Goal: Verbalizes/displays adequate comfort level or baseline comfort level  7/24/2024 1849 by Brooke Nur RN  Outcome: Progressing  7/24/2024 1632 by Brooke Nur RN  Outcome: Progressing     Problem: Neurosensory - Adult  Goal: Achieves stable or improved neurological status  7/24/2024 1849 by Brooke Nur RN  Outcome: Progressing  7/24/2024 1632 by Brooke Nur RN  Outcome: Progressing  Goal: Achieves maximal functionality and self care  7/24/2024 1849 by Brooke Nur RN  Outcome: Progressing  7/24/2024 1632 by Brooke Nur RN  Outcome: Progressing     Problem: Respiratory - Adult  Goal: Achieves optimal ventilation and oxygenation  7/24/2024 1849 by Brooke Nur RN  Outcome: Progressing  7/24/2024 1632 by Brooke Nur RN  Outcome: Progressing     Problem: Cardiovascular - Adult  Goal: Maintains optimal cardiac output and hemodynamic stability  7/24/2024 1849 by Brooke Nur RN  Outcome: Progressing  7/24/2024 1632 by Brooke Nur RN  Outcome: Progressing  Goal: Absence of cardiac dysrhythmias or at baseline  7/24/2024 1849 by Brooke Nur RN  Outcome: Progressing  7/24/2024 1632 by Brooke Nur RN  Outcome: Progressing     Problem: Skin/Tissue Integrity - Adult  Goal: Incisions, wounds, or drain sites healing without S/S of infection  7/24/2024 1849 by Brooke Nur RN  Outcome:

## 2024-07-24 NOTE — DISCHARGE INSTRUCTIONS
Discharge Instructions for Open Heart Surgery    What you will need at home:               * Accurate Scale               *  Digital Thermometer               *  Antibacterial Soap                *  Clean Wash Cloths             *  Someone to be with you for one week              DO NOT LIFT, PUSH, OR PULL ANYTHING OVER 5 POUNDS FOR 8 WEEK from the day of surgery. This could prevent your sternum from healing properly.                    ?When you are given permission from your surgeon to begin lifting again,                     do so gradually. You will need time to build your muscle strength.                  ? A gallon of milk is more than 5 lbs. you should buy ½ gallons.    NEVER SMOKE AGAIN!  Absolutely no tobacco products!  Do not allow others to smoke around you.  Second hand smoke can be just as bad.  The [employer] has a free program available, call 1-963.340.6023.      Activity: See your activity diary in your binder for your walking plan.  Plan to walk indoors on days the temperature is below 40? or over 80? or during smog alerts.  At first limit your stair climbing to once or twice a day.  You may use the handrail for balance only.  Do not pull yourself up with it.  It is not unusual to feel tired for the first few weeks, but walking builds up your strength.    Driving: Do not drive a car or any vehicle for 4 weeks from the day of surgery.  You can not drive a truck, tractor or  for 8 weeks from the day of surgery.  After 4 weeks, you can drive vehicles with power steering only.  Do not drive while on pain medication.    Incentive Spirometer:  Continue to use your lung exerciser for the next 4 weeks.  Support your chest and cough each time you complete the 10 exercises.    Weight:  Weigh yourself every morning.  Call the surgeon if you gain 3 pounds or more overnight or 5 pounds in a week.  This may be a sign of fluid retention.    Medication:    Pain pills are ordered to keep you

## 2024-07-24 NOTE — PLAN OF CARE
Problem: Discharge Planning  Goal: Discharge to home or other facility with appropriate resources  7/24/2024 1632 by Brooke Nur RN  Outcome: Progressing  7/24/2024 0418 by Gianna Rose RN  Outcome: Progressing     Problem: Safety - Adult  Goal: Free from fall injury  7/24/2024 1632 by Brooke Nur RN  Outcome: Progressing  7/24/2024 0418 by Gianna Rose RN  Outcome: Progressing     Problem: ABCDS Injury Assessment  Goal: Absence of physical injury  7/24/2024 1632 by Brooke Nur RN  Outcome: Progressing  7/24/2024 0418 by Gianna Rose RN  Outcome: Progressing     Problem: Pain  Goal: Verbalizes/displays adequate comfort level or baseline comfort level  7/24/2024 1632 by Brooke Nur RN  Outcome: Progressing  7/24/2024 0418 by Gianna Rose RN  Outcome: Progressing     Problem: Neurosensory - Adult  Goal: Achieves stable or improved neurological status  7/24/2024 1632 by Brooke Nur RN  Outcome: Progressing  7/24/2024 0418 by Gianna Rose RN  Outcome: Progressing  Goal: Achieves maximal functionality and self care  Outcome: Progressing     Problem: Respiratory - Adult  Goal: Achieves optimal ventilation and oxygenation  7/24/2024 1632 by Brooke Nur RN  Outcome: Progressing  7/24/2024 0418 by Gianna Rose RN  Outcome: Progressing     Problem: Cardiovascular - Adult  Goal: Maintains optimal cardiac output and hemodynamic stability  7/24/2024 1632 by Brooke Nur RN  Outcome: Progressing  7/24/2024 0418 by Gianna Rose RN  Outcome: Progressing  Goal: Absence of cardiac dysrhythmias or at baseline  Outcome: Progressing     Problem: Skin/Tissue Integrity - Adult  Goal: Incisions, wounds, or drain sites healing without S/S of infection  7/24/2024 1632 by Brooke Nur RN  Outcome: Progressing  7/24/2024 0418 by Gianna Rose RN  Outcome: Progressing     Problem: Musculoskeletal - Adult  Goal: Return mobility to safest level of function  7/24/2024 1632 by Brooke Nur RN  Outcome:

## 2024-07-24 NOTE — PROGRESS NOTES
Inpatient Cardiology Progress note        SUBJECTIVE/OBJECTIVE:   Converted back to normal sinus rhythm this morning, walking around the unit with mild shortness of breath but improving.     PHYSICAL EXAM:   /69   Pulse 63   Temp 98.5 °F (36.9 °C) (Temporal)   Resp 18   Ht 1.702 m (5' 7.01\")   Wt 69.9 kg (154 lb 3.2 oz)   SpO2 99%   BMI 24.15 kg/m²    B/P Range last 24 hours: Systolic (24hrs), Av , Min:92 , Max:127    Diastolic (24hrs), Av, Min:60, Max:78      GENERAL: Not in distress.   HEAD: Normocephalic, Atraumatic.   NECK: No JVD. No carotid bruits. No neck masses.?   CARDIOVASCULAR: Irregular rhythm,  normal S1, S2, no MRG    LUNGS: Clear to auscultation bilaterally, no wheezing.?   ABDOMEN: Abdomen is soft and not distended. No tenderness.  EXTREMITIES:There is mild right but no left pedal edema.   MUSCULOSKELETAL: No joint swelling. Normal range of motion?   SKIN: Skins is moist. No ulcers or lesions.   NEUROLOGICAL: Conscious, alert, oriented to time, place and person. No evidence of obvious neurological deficits.?   PSYCHOLOGICAL: Patient is in normal mood.?       Intake/Output Summary (Last 24 hours) at 2024 0123  Last data filed at 2024 1425  Gross per 24 hour   Intake 120 ml   Output 650 ml   Net -530 ml       Weight:   Wt Readings from Last 3 Encounters:   24 69.9 kg (154 lb 3.2 oz)   24 69.9 kg (154 lb)   24 68 kg (150 lb)       Current Inpatient Medications:   metoclopramide  10 mg IntraVENous Q6H    amiodarone  400 mg Oral TID    lidocaine 1 % injection  50 mg IntraDERmal Once    piperacillin-tazobactam  3,375 mg IntraVENous Q8H    aspirin  81 mg Oral Daily    enoxaparin  40 mg SubCUTAneous Daily    carvedilol  3.125 mg Oral BID WC    amLODIPine  5 mg Oral Daily    hydrALAZINE  25 mg Oral 3 times per day    bisacodyl  10 mg Rectal Daily    ferrous sulfate  325 mg Oral BID WC    folic acid  1 mg Oral Daily    insulin lispro  0-12 Units  Valve: Posterior leaflet prolapse noted. Severe regurgitation with an anterior directed jet.    Left Atrium: Left atrium is dilated. No left atrial appendage thrombus noted.    Interatrial Septum: No evidence of right to left interatrial shunting on bubble study.    - Cardiac cath 4/24/24:  1.  Coronary artery disease  Left main: No significant angiographic  LAD: Focal 70% stenosis just after the first diagonal and before the first septal  branch.  70% apical LAD disease after it wraparound apex 70% ostial stenosis of a moderate-sized first diagonal branch  Intermediate ramus/high first obtuse marginal: No significant angiographic disease  LCx: 70% stenosis before the first obtuse marginal branch 70% ostial stenosis of the obtuse marginal branch long irregular up to 70% mid left circumflex stenosis before the terminal lateral branch.  RCA: Dominant vessel which is excessively tortuous with 50% proximal vessel stenosis and 40% mid vessel disease  2.  Left ventricular end-diastolic pressure = 2 mmHg     -TTE 7/23/2024:    Left Ventricle: Normal left ventricular systolic function with a visually estimated EF of 55 - 60%. Left ventricle size is normal. Mildly increased wall thickness. Normal wall motion.    Right Ventricle: Right ventricle is mildly dilated. Reduced systolic function.    Mitral Valve: Valve repaired by commissurotomy and annular band.    Tricuspid Valve: Repaired by annular ring.    Left Atrium: Left atrium is severely dilated.    Pericardium: Trivial pericardial effusion present. No indication of cardiac tamponade.    Image quality is adequate.      ASSESSMENT/PLAN:      53-year-old male with past medical history of CAD and VHD s/p CABG x 3 (LIMA to LAD, SVG-diagonal, SVG-RCA) on 7/19/2024 along with mitral valve repair (30 mm Medtronic Future band) and tricuspid valve repair (28 mm Medtronic triad band) and SATIHSH excision with 45 mm AtriClip (7/19/2024), HTN, marijuana, cigar use.    CAD:  s/p

## 2024-07-24 NOTE — PROGRESS NOTES
POD#5 Awake, alert. No complaints. Denies CP, palpitations, SOB at rest, dizziness/lightheadedness.    Vitals:    07/24/24 0313 07/24/24 0318 07/24/24 0640 07/24/24 0732   BP: 104/78  125/74 127/73   Pulse: 60  65 65   Resp: 18  18 18   Temp: 96.9 °F (36.1 °C)  97 °F (36.1 °C) 97.6 °F (36.4 °C)   TempSrc: Temporal  Temporal Temporal   SpO2: 100%  98% 100%   Weight:  69.9 kg (154 lb 3.2 oz)     Height:         O2: none      Intake/Output Summary (Last 24 hours) at 7/24/2024 0801  Last data filed at 7/24/2024 0659  Gross per 24 hour   Intake --   Output 600 ml   Net -600 ml         +BM on 7/23    UO: 200mL/8hr         Recent Labs     07/21/24  1110 07/22/24  0801 07/23/24  0559   WBC 19.5* 18.7* 16.7*   HGB 7.9* 7.8* 7.4*   HCT 24.4* 24.4* 22.7*   * 133 186      Recent Labs     07/22/24  0428 07/23/24  0559 07/24/24  0615   BUN 23* 29* 28*   CREATININE 0.8 1.0 0.9         Telemetry: SR      PE  Cardiac: RRR  Lungs: decreased bases  Chest incision C/D/I, approximated, no erythema. Sternum stable. Prior chest tube site incisions C/D/I, no erythema with intact sutures. Chest tubes x 2 and Epicardial pacing wires present and secure.   Abd: Soft,  nondistended, nontender, +BS, +flatus  Ext: Incisions C/D/I, approximated, no erythema, + edema. LEFT groin prior cannulation site soft, C/D/I, no palpable hematoma           A/P: POD#5    1. CAD/MR/TR  --Stable s/p Sternotomy/CABG x 3 (LIMA-LAD, SVG-Diag, SVG-RCA)/Complex mitral valve repair by medial commissuroplasty and 30mm Medtronic Future band/Tricuspid valve repair with 28mm Medtronic TriAd band/SATHISH exclusion with 45mm AtriClip/EVH RLE/Rigid internal fixation of the sternum with KLS plates x 2  on 7/19/2024  --Post op CAMILLE reveals 60% EF  --will order TTE day before or day of discharge to evaluate MV -- completed; see report  --Scr stable  --(+) ASA/statin/coreg --initiate eliquis today pending hgb result for MVRp and TVRp   --reinforce sternal

## 2024-07-25 VITALS
DIASTOLIC BLOOD PRESSURE: 56 MMHG | SYSTOLIC BLOOD PRESSURE: 112 MMHG | WEIGHT: 152.6 LBS | HEART RATE: 60 BPM | BODY MASS INDEX: 23.95 KG/M2 | TEMPERATURE: 98.7 F | RESPIRATION RATE: 18 BRPM | OXYGEN SATURATION: 99 % | HEIGHT: 67 IN

## 2024-07-25 LAB
ABO/RH: NORMAL
ANION GAP SERPL CALCULATED.3IONS-SCNC: 12 MMOL/L (ref 7–16)
ANTIBODY SCREEN: NEGATIVE
ARM BAND NUMBER: NORMAL
BLOOD BANK BLOOD PRODUCT EXPIRATION DATE: NORMAL
BLOOD BANK DISPENSE STATUS: NORMAL
BLOOD BANK ISBT PRODUCT BLOOD TYPE: 5100
BLOOD BANK PRODUCT CODE: NORMAL
BLOOD BANK SAMPLE EXPIRATION: NORMAL
BLOOD BANK UNIT TYPE AND RH: NORMAL
BPU ID: NORMAL
BUN SERPL-MCNC: 21 MG/DL (ref 6–20)
CALCIUM SERPL-MCNC: 8.4 MG/DL (ref 8.6–10.2)
CHLORIDE SERPL-SCNC: 102 MMOL/L (ref 98–107)
CO2 SERPL-SCNC: 25 MMOL/L (ref 22–29)
COMPONENT: NORMAL
CREAT SERPL-MCNC: 0.9 MG/DL (ref 0.7–1.2)
CROSSMATCH RESULT: NORMAL
ERYTHROCYTE [DISTWIDTH] IN BLOOD BY AUTOMATED COUNT: 15.5 % (ref 11.5–15)
GFR, ESTIMATED: >90 ML/MIN/1.73M2
GLUCOSE BLD-MCNC: 123 MG/DL (ref 74–99)
GLUCOSE BLD-MCNC: 140 MG/DL (ref 74–99)
GLUCOSE SERPL-MCNC: 105 MG/DL (ref 74–99)
HCT VFR BLD AUTO: 29.2 % (ref 37–54)
HGB BLD-MCNC: 9.6 G/DL (ref 12.5–16.5)
MAGNESIUM SERPL-MCNC: 2.2 MG/DL (ref 1.6–2.6)
MCH RBC QN AUTO: 29.3 PG (ref 26–35)
MCHC RBC AUTO-ENTMCNC: 32.9 G/DL (ref 32–34.5)
MCV RBC AUTO: 89 FL (ref 80–99.9)
PLATELET # BLD AUTO: 264 K/UL (ref 130–450)
PMV BLD AUTO: 11 FL (ref 7–12)
POTASSIUM SERPL-SCNC: 3.3 MMOL/L (ref 3.5–5)
RBC # BLD AUTO: 3.28 M/UL (ref 3.8–5.8)
SODIUM SERPL-SCNC: 139 MMOL/L (ref 132–146)
TRANSFUSION STATUS: NORMAL
UNIT DIVISION: 0
UNIT ISSUE DATE/TIME: NORMAL
WBC OTHER # BLD: 14.7 K/UL (ref 4.5–11.5)

## 2024-07-25 PROCEDURE — 93798 PHYS/QHP OP CAR RHAB W/ECG: CPT

## 2024-07-25 PROCEDURE — 6370000000 HC RX 637 (ALT 250 FOR IP)

## 2024-07-25 PROCEDURE — 85027 COMPLETE CBC AUTOMATED: CPT

## 2024-07-25 PROCEDURE — 94640 AIRWAY INHALATION TREATMENT: CPT

## 2024-07-25 PROCEDURE — 6360000002 HC RX W HCPCS: Performed by: NURSE PRACTITIONER

## 2024-07-25 PROCEDURE — 80048 BASIC METABOLIC PNL TOTAL CA: CPT

## 2024-07-25 PROCEDURE — 6370000000 HC RX 637 (ALT 250 FOR IP): Performed by: NURSE PRACTITIONER

## 2024-07-25 PROCEDURE — 2580000003 HC RX 258: Performed by: NURSE PRACTITIONER

## 2024-07-25 PROCEDURE — 94669 MECHANICAL CHEST WALL OSCILL: CPT

## 2024-07-25 PROCEDURE — 82962 GLUCOSE BLOOD TEST: CPT

## 2024-07-25 PROCEDURE — 83735 ASSAY OF MAGNESIUM: CPT

## 2024-07-25 PROCEDURE — 36415 COLL VENOUS BLD VENIPUNCTURE: CPT

## 2024-07-25 RX ORDER — POTASSIUM CHLORIDE 20 MEQ/1
40 TABLET, EXTENDED RELEASE ORAL
Status: COMPLETED | OUTPATIENT
Start: 2024-07-25 | End: 2024-07-25

## 2024-07-25 RX ADMIN — HYDRALAZINE HYDROCHLORIDE 25 MG: 25 TABLET ORAL at 14:33

## 2024-07-25 RX ADMIN — POTASSIUM CHLORIDE 40 MEQ: 1500 TABLET, EXTENDED RELEASE ORAL at 09:16

## 2024-07-25 RX ADMIN — HYDRALAZINE HYDROCHLORIDE 25 MG: 25 TABLET ORAL at 06:26

## 2024-07-25 RX ADMIN — ATORVASTATIN CALCIUM 40 MG: 40 TABLET, FILM COATED ORAL at 09:16

## 2024-07-25 RX ADMIN — Medication 500 MG: at 09:16

## 2024-07-25 RX ADMIN — APIXABAN 5 MG: 5 TABLET, FILM COATED ORAL at 09:16

## 2024-07-25 RX ADMIN — HYDROCODONE BITARTRATE AND ACETAMINOPHEN 2 TABLET: 5; 325 TABLET ORAL at 04:00

## 2024-07-25 RX ADMIN — METOCLOPRAMIDE 10 MG: 5 INJECTION, SOLUTION INTRAMUSCULAR; INTRAVENOUS at 04:27

## 2024-07-25 RX ADMIN — FOLIC ACID 1 MG: 1 TABLET ORAL at 09:15

## 2024-07-25 RX ADMIN — HYDROCODONE BITARTRATE AND ACETAMINOPHEN 2 TABLET: 5; 325 TABLET ORAL at 15:47

## 2024-07-25 RX ADMIN — IPRATROPIUM BROMIDE AND ALBUTEROL SULFATE 1 DOSE: .5; 2.5 SOLUTION RESPIRATORY (INHALATION) at 10:11

## 2024-07-25 RX ADMIN — AMLODIPINE BESYLATE 5 MG: 5 TABLET ORAL at 09:16

## 2024-07-25 RX ADMIN — IPRATROPIUM BROMIDE AND ALBUTEROL SULFATE 1 DOSE: .5; 2.5 SOLUTION RESPIRATORY (INHALATION) at 15:51

## 2024-07-25 RX ADMIN — ASPIRIN 81 MG: 81 TABLET, COATED ORAL at 09:15

## 2024-07-25 RX ADMIN — PIPERACILLIN AND TAZOBACTAM 3375 MG: 3; .375 INJECTION, POWDER, LYOPHILIZED, FOR SOLUTION INTRAVENOUS at 04:27

## 2024-07-25 RX ADMIN — AMIODARONE HYDROCHLORIDE 400 MG: 200 TABLET ORAL at 09:16

## 2024-07-25 RX ADMIN — FERROUS SULFATE TAB 325 MG (65 MG ELEMENTAL FE) 325 MG: 325 (65 FE) TAB at 09:15

## 2024-07-25 RX ADMIN — PIPERACILLIN AND TAZOBACTAM 3375 MG: 3; .375 INJECTION, POWDER, LYOPHILIZED, FOR SOLUTION INTRAVENOUS at 12:47

## 2024-07-25 RX ADMIN — INSULIN LISPRO 2 UNITS: 100 INJECTION, SOLUTION INTRAVENOUS; SUBCUTANEOUS at 12:48

## 2024-07-25 RX ADMIN — Medication 400 MG: at 09:16

## 2024-07-25 RX ADMIN — PANTOPRAZOLE SODIUM 40 MG: 40 TABLET, DELAYED RELEASE ORAL at 06:26

## 2024-07-25 RX ADMIN — FUROSEMIDE 20 MG: 20 TABLET ORAL at 09:15

## 2024-07-25 RX ADMIN — POTASSIUM CHLORIDE 40 MEQ: 1500 TABLET, EXTENDED RELEASE ORAL at 12:47

## 2024-07-25 RX ADMIN — CARVEDILOL 3.12 MG: 3.12 TABLET, FILM COATED ORAL at 09:15

## 2024-07-25 ASSESSMENT — PAIN DESCRIPTION - DESCRIPTORS
DESCRIPTORS: ACHING;SORE;DISCOMFORT
DESCRIPTORS: ACHING;SORE;TENDER

## 2024-07-25 ASSESSMENT — PAIN - FUNCTIONAL ASSESSMENT
PAIN_FUNCTIONAL_ASSESSMENT: PREVENTS OR INTERFERES SOME ACTIVE ACTIVITIES AND ADLS
PAIN_FUNCTIONAL_ASSESSMENT: ACTIVITIES ARE NOT PREVENTED

## 2024-07-25 ASSESSMENT — PAIN DESCRIPTION - LOCATION
LOCATION: CHEST;LEG
LOCATION: CHEST;LEG

## 2024-07-25 ASSESSMENT — PAIN SCALES - GENERAL
PAINLEVEL_OUTOF10: 8
PAINLEVEL_OUTOF10: 0
PAINLEVEL_OUTOF10: 5
PAINLEVEL_OUTOF10: 10

## 2024-07-25 ASSESSMENT — PAIN SCALES - WONG BAKER: WONGBAKER_NUMERICALRESPONSE: NO HURT

## 2024-07-25 ASSESSMENT — PAIN DESCRIPTION - ORIENTATION: ORIENTATION: MID

## 2024-07-25 NOTE — PLAN OF CARE
Problem: Discharge Planning  Goal: Discharge to home or other facility with appropriate resources  7/25/2024 0326 by Kellie Parsons RN  Outcome: Progressing  Flowsheets (Taken 7/24/2024 2000)  Discharge to home or other facility with appropriate resources: Identify barriers to discharge with patient and caregiver  7/24/2024 1849 by Brooke Nur RN  Outcome: Progressing  7/24/2024 1632 by Brooke Nur RN  Outcome: Progressing     Problem: Safety - Adult  Goal: Free from fall injury  7/25/2024 0326 by Kellie Parsons RN  Outcome: Progressing  7/24/2024 1849 by Brooke Nur RN  Outcome: Progressing  7/24/2024 1632 by Brooke Nur RN  Outcome: Progressing     Problem: ABCDS Injury Assessment  Goal: Absence of physical injury  7/25/2024 0326 by Kellie Parsons RN  Outcome: Progressing  7/24/2024 1849 by Brooke Nur RN  Outcome: Progressing  7/24/2024 1632 by Brooke Nur RN  Outcome: Progressing     Problem: Pain  Goal: Verbalizes/displays adequate comfort level or baseline comfort level  7/25/2024 0326 by Kellie Parsons RN  Outcome: Progressing  7/24/2024 1849 by Brooke Nur RN  Outcome: Progressing  7/24/2024 1632 by Brooke Nur RN  Outcome: Progressing     Problem: Neurosensory - Adult  Goal: Achieves stable or improved neurological status  7/25/2024 0326 by Kellie Parsons RN  Outcome: Progressing  Flowsheets (Taken 7/24/2024 2000)  Achieves stable or improved neurological status: Assess for and report changes in neurological status  7/24/2024 1849 by Brooke Nur RN  Outcome: Progressing  7/24/2024 1632 by Brooke Nur RN  Outcome: Progressing  Goal: Achieves maximal functionality and self care  7/25/2024 0326 by Kellie Parsons RN  Outcome: Progressing  Flowsheets (Taken 7/24/2024 2000)  Achieves maximal functionality and self care: Monitor swallowing and airway patency with patient fatigue and changes in neurological status  7/24/2024 1849 by Brooke Nur RN  Outcome:

## 2024-07-25 NOTE — PATIENT CARE CONFERENCE
[facility] Quality Flow/Interdisciplinary Rounds Progress Note        Quality Flow Rounds held on July 25, 2024    Disciplines Attending:  Bedside Nurse, , , and Nursing Unit Leadership    Binh Erazo was admitted on 7/19/2024  5:01 AM    Anticipated Discharge Date:       Disposition:    Armani Score:  Armani Scale Score: 20    Readmission Risk              Risk of Unplanned Readmission:  21           Discussed patient goal for the day, patient clinical progression, and barriers to discharge.  The following Goal(s) of the Day/Commitment(s) have been identified:  ambulate/discharge planning      Grace Morales RN  July 25, 2024

## 2024-07-25 NOTE — PROGRESS NOTES
Discharge education provided to patient and patients girlfriend. All questions answered. Emphasized the importance of follow up appointments, medication compliance, and incision care. Patient verbalized understanding.     IV out. Heart monitor off.     Greta Muñoz RN

## 2024-07-25 NOTE — PLAN OF CARE
optimal ventilation and oxygenation  7/25/2024 0326 by Kellie Parsons RN  Outcome: Progressing  Flowsheets (Taken 7/24/2024 2000)  Achieves optimal ventilation and oxygenation: Assess for changes in respiratory status  7/24/2024 1849 by Brooke Nur RN  Outcome: Progressing     Problem: Cardiovascular - Adult  Goal: Maintains optimal cardiac output and hemodynamic stability  7/25/2024 0732 by Greta Grimaldo RN  Outcome: Progressing  7/25/2024 0326 by Kellie Parsons RN  Outcome: Progressing  Flowsheets (Taken 7/24/2024 2000)  Maintains optimal cardiac output and hemodynamic stability: Monitor blood pressure and heart rate  7/24/2024 1849 by Brooke Nur RN  Outcome: Progressing  Goal: Absence of cardiac dysrhythmias or at baseline  7/25/2024 0326 by Kellie Parsons RN  Outcome: Progressing  Flowsheets (Taken 7/24/2024 2000)  Absence of cardiac dysrhythmias or at baseline: Monitor cardiac rate and rhythm  7/24/2024 1849 by Brooke Nur RN  Outcome: Progressing     Problem: Skin/Tissue Integrity - Adult  Goal: Incisions, wounds, or drain sites healing without S/S of infection  7/25/2024 0326 by Kellie Parsons RN  Outcome: Progressing  Flowsheets (Taken 7/24/2024 2000)  Incisions, Wounds, or Drain Sites Healing Without Sign and Symptoms of Infection: TWICE DAILY: Assess and document skin integrity  7/24/2024 1849 by Brooke Nur RN  Outcome: Progressing     Problem: Musculoskeletal - Adult  Goal: Return mobility to safest level of function  7/25/2024 0732 by Greta Grimaldo RN  Outcome: Progressing  7/25/2024 0326 by Kellie Parsons RN  Outcome: Progressing  Flowsheets (Taken 7/24/2024 2000)  Return Mobility to Safest Level of Function: Assess patient stability and activity tolerance for standing, transferring and ambulating with or without assistive devices  7/24/2024 1849 by Brooke Nur RN  Outcome: Progressing  Goal: Return ADL status to a safe level of function  7/25/2024 0326 by Issa  Assess vascular access sites hourly  3.  Every 4-6 hours minimum:  Change oxygen saturation probe site  4.  Every 4-6 hours:  If on nasal continuous positive airway pressure, respiratory therapy assess nares and determine need for appliance change or resting period.  7/25/2024 0732 by Greta Grimaldo RN  Outcome: Progressing  7/25/2024 0326 by Kellie Parsons RN  Outcome: Progressing  7/24/2024 1849 by Brooke Nur RN  Outcome: Progressing     Problem: Nutrition Deficit:  Goal: Optimize nutritional status  7/25/2024 0732 by Greta Grimaldo RN  Outcome: Progressing  7/25/2024 0326 by Kellie Parsons RN  Outcome: Progressing  7/24/2024 1849 by Brooke Nur, RN  Outcome: Progressing

## 2024-07-25 NOTE — PROGRESS NOTES
Met with patient and discussed that their physician has ordered a referral to our Home [facility] and our outpatient Phase II Cardiac Rehabilitation program. Reviewed the benefits of cardiac rehabilitation based on their diagnosis and personal risk factors. Patient demonstrates strong interest in Cardiac Rehabilitation at this time.  Cardiac Rehabilitation brochure provided to patient/family. The Cardiac Rehabilitation Program has been provided the patient's referral information and pertinent patient details and history. The patient may call [facility] at [phone] for additional information or questions. Contact information for [facility] and other choices close to the patient's residence have been provided in the discharge instructions so that the patient may call and schedule an appointment when cleared by their physician. Thank you for the referral.

## 2024-07-25 NOTE — CARE COORDINATION
Transition of care update: POD#6 CABG x 3/ MV repair/Tricuspid valve repair. K 3.3, WBC 14.7, hgb 9.6 and other labs noted. Iv zosyn 3,375mg q 8 hrs. Pt ambulated 400ft. On RA. Plan is to return home with his girlfriend, Hiral. [facility] is setup. Cm/jasmina will follow.       1505  Notified Ca with [facility] of discharge order on chart.

## 2024-07-25 NOTE — PROGRESS NOTES
POD#6 Awake, alert. No complaints. Denies CP, palpitations, SOB at rest, dizziness/lightheadedness.    Vitals:    07/25/24 0411 07/25/24 0452 07/25/24 0626 07/25/24 0740   BP:   118/74 130/64   Pulse:    61   Resp:    17   Temp: 98.6 °F (37 °C)   98.6 °F (37 °C)   TempSrc: Oral   Oral   SpO2:    100%   Weight:  69.2 kg (152 lb 9.6 oz)     Height:         O2: none      Intake/Output Summary (Last 24 hours) at 7/25/2024 0741  Last data filed at 7/25/2024 0433  Gross per 24 hour   Intake 608 ml   Output 2150 ml   Net -1542 ml         +BM on 7/24    UO: 300mL/8hr         Recent Labs     07/22/24  0801 07/23/24  0559 07/24/24  0615 07/24/24  1839 07/24/24  1940   WBC 18.7* 16.7* 12.5*  --   --    HGB 7.8* 7.4* 6.7* 8.5* 8.9*   HCT 24.4* 22.7* 20.1* 26.6* 26.6*    186 184  --   --       Recent Labs     07/23/24  0559 07/24/24  0615   BUN 29* 28*   CREATININE 1.0 0.9       Telemetry: SR/SB (high 50s-60s)        PE  Cardiac: RRR, intermittent bradycardia  Lungs: decreased bases  Chest incision C/D/I, approximated, no erythema. Sternum stable. Prior chest tube site incisions C/D/I, no erythema with intact sutures.   Abd: Soft,  nondistended, nontender, +BS, +flatus  Ext: Incisions C/D/I, approximated, no erythema, + edema. LEFT groin prior cannulation site soft, C/D/I, no palpable hematoma           A/P: POD#6    1. CAD/MR/TR  --Stable s/p Sternotomy/CABG x 3 (LIMA-LAD, SVG-Diag, SVG-RCA)/Complex mitral valve repair by medial commissuroplasty and 30mm Medtronic Future band/Tricuspid valve repair with 28mm Medtronic TriAd band/SATHISH exclusion with 45mm AtriClip/EVH RLE/Rigid internal fixation of the sternum with KLS plates x 2  on 7/19/2024  --Post op CAMILLE reveals 60% EF  --will order TTE day before or day of discharge to evaluate MV -- completed; see report  --(+) ASA/statin/coreg --eliquis initiated 7/24 evening for MVRp and TVRp   --reinforce sternal precautions  --epicardial wires removed 7/24  --remaining chest tubes

## 2024-07-25 NOTE — DISCHARGE SUMMARY
Physician Discharge Summary     Patient ID:  Binh Erazo  [id number]  53 y.o.  1970    Admit date: 7/19/2024    Discharge date: 7/25/24    Admitting Physician: Cm Nichols MD     Discharge Physician: Cm Nichols MD      Discharge Diagnoses:   POSTOPERATIVE DIAGNOSES: Severe multivessel coronary artery disease, severe mitral regurgitation, moderate tricuspid regurgitation, pulmonary hypertension, systemic hypertension.     OPERATIONS:    1. Sternotomy.  2. Coronary artery bypass grafting x3; left internal mammary artery to the LAD, saphenous vein graft to diagonal branch of the LAD, saphenous vein graft to right coronary artery.  3. Complex mitral valve repair by medial commissuroplasty and 30 mm Medtronic Future band.  4. Tricuspid valve repair with a 28 mm Medtronic Tri-Ad band.  5. Left atrial appendage exclusion with a 45 mm AtriClip.  6. Endoscopic vein harvesting of right lower extremity greater saphenous vein.  7. Rigid internal fixation of sternum with KLS plates x2.    Discharged Condition: stable    Indication for Admission:   Severe multivessel coronary artery disease, severe mitral regurgitation, moderate tricuspid regurgitation, pulmonary hypertension, systemic hypertension.     Hospital Course: Course as above, and the patient underwent Sternotomy/CABG x 3 (LIMA-LAD, SVG-Diag, SVG-RCA)/Complex mitral valve repair by medial commissuroplasty and 30mm Medtronic Future band/Tricuspid valve repair with 28mm Medtronic TriAd band/SATHISH exclusion with 45mm AtriClip/EVH RLE/Rigid internal fixation of the sternum with KLS plates x 2 on 7/19/2024. Postoperatively, he was transferred to the CVICU in guarded stable condition and extubated once indications met. Once appropriate, he was transferred to the monitored stepdown unit and beta blockade was initiated. He did experience POAF and was treated with BB, amiodarone, and eliquis. He was on eliquis in addition for his MVrp and TVrp. He experienced RICHARD with

## 2024-07-26 PROBLEM — Z01.818 PRE-OP TESTING: Status: RESOLVED | Noted: 2024-06-26 | Resolved: 2024-07-26

## 2024-07-27 DIAGNOSIS — G89.18 ACUTE POST-OPERATIVE PAIN: Primary | ICD-10-CM

## 2024-07-27 RX ORDER — OXYCODONE HYDROCHLORIDE AND ACETAMINOPHEN 5; 325 MG/1; MG/1
1 TABLET ORAL EVERY 6 HOURS PRN
Qty: 28 TABLET | Refills: 0 | Status: SHIPPED | OUTPATIENT
Start: 2024-07-27 | End: 2024-07-27

## 2024-07-27 RX ORDER — OXYCODONE HYDROCHLORIDE AND ACETAMINOPHEN 5; 325 MG/1; MG/1
1 TABLET ORAL EVERY 6 HOURS PRN
Qty: 28 TABLET | Refills: 0 | Status: SHIPPED | OUTPATIENT
Start: 2024-07-27 | End: 2024-08-03

## 2024-07-29 RX ORDER — ASPIRIN 81 MG/1
81 TABLET, COATED ORAL DAILY
Qty: 90 TABLET | Refills: 5 | OUTPATIENT
Start: 2024-07-29

## 2024-07-30 ENCOUNTER — OFFICE VISIT (OUTPATIENT)
Dept: CARDIOLOGY CLINIC | Age: 54
End: 2024-07-30
Payer: COMMERCIAL

## 2024-07-30 VITALS
WEIGHT: 154 LBS | HEIGHT: 67 IN | RESPIRATION RATE: 16 BRPM | HEART RATE: 70 BPM | DIASTOLIC BLOOD PRESSURE: 72 MMHG | BODY MASS INDEX: 24.17 KG/M2 | SYSTOLIC BLOOD PRESSURE: 140 MMHG

## 2024-07-30 DIAGNOSIS — I25.10 CORONARY ARTERY DISEASE INVOLVING NATIVE CORONARY ARTERY OF NATIVE HEART WITHOUT ANGINA PECTORIS: ICD-10-CM

## 2024-07-30 DIAGNOSIS — I07.1 TRICUSPID VALVE INSUFFICIENCY, UNSPECIFIED ETIOLOGY: ICD-10-CM

## 2024-07-30 DIAGNOSIS — I51.7 LVH (LEFT VENTRICULAR HYPERTROPHY): ICD-10-CM

## 2024-07-30 DIAGNOSIS — I10 ESSENTIAL HYPERTENSION: ICD-10-CM

## 2024-07-30 DIAGNOSIS — I34.0 MITRAL VALVE INSUFFICIENCY, UNSPECIFIED ETIOLOGY: ICD-10-CM

## 2024-07-30 DIAGNOSIS — R94.39 ABNORMAL NUCLEAR STRESS TEST: ICD-10-CM

## 2024-07-30 DIAGNOSIS — Z72.0 TOBACCO ABUSE: ICD-10-CM

## 2024-07-30 DIAGNOSIS — R94.31 ABNORMAL EKG: ICD-10-CM

## 2024-07-30 DIAGNOSIS — R06.09 DOE (DYSPNEA ON EXERTION): ICD-10-CM

## 2024-07-30 DIAGNOSIS — I07.1 MODERATE TRICUSPID VALVE REGURGITATION: ICD-10-CM

## 2024-07-30 DIAGNOSIS — I34.0 SEVERE MITRAL REGURGITATION: ICD-10-CM

## 2024-07-30 DIAGNOSIS — I34.0 SEVERE MITRAL VALVE REGURGITATION: Primary | ICD-10-CM

## 2024-07-30 PROCEDURE — 99214 OFFICE O/P EST MOD 30 MIN: CPT | Performed by: INTERNAL MEDICINE

## 2024-07-30 PROCEDURE — 3078F DIAST BP <80 MM HG: CPT | Performed by: INTERNAL MEDICINE

## 2024-07-30 PROCEDURE — 93000 ELECTROCARDIOGRAM COMPLETE: CPT | Performed by: INTERNAL MEDICINE

## 2024-07-30 PROCEDURE — 3077F SYST BP >= 140 MM HG: CPT | Performed by: INTERNAL MEDICINE

## 2024-07-30 NOTE — PROGRESS NOTES
Out PATIENT New CARDIOLOGY CONSULT    Name: Binh Erazo    Age: 53 y.o.    Date of Admission: No admission date for patient encounter.    Date of Service: 8/1/2024    Reason for Consultation:   Chief Complaint   Patient presents with    Valvular Heart Disease     3 month office visit          Referring Physician: No admitting provider for patient encounter.    History of Present Illness: 53-year-old male with history of hypertension, degenerative joint disease and tobacco abuse coronary artery disease status post CABG with LIMA to LAD, SVG to diagonal, SVG to RCA and mitral valve repair with left atrial appendage exclusion on July 19, 2024 who presents for follow-up visit.  She comes with a girlfriend and both patient and girlfriend do not know the medicines patient is supposed to take.  He reports he has not yet been referred to cardiac rehab but chart record shows patient was educated about cardiac rehab and was referred to cardiac rehab.  Patient and girlfriend is inquiring when they can resume drinking alcohol with friends and patient was advised to proceed with cardiac rehab and get his strength back prior to alcohol drinking.  Patient and girlfriend advised to refrain from alcohol abuse.  Patient and girlfriend will bring home medications including the bottles for optimal medication reconciliation and they will follow-up in 2 to 4 weeks.          Past Medical History:  CAD:  s/p CABG x 3 (LIMA to LAD, SVG-diagonal, SVG-RCA) on 7/19/2024  Continue ASA 81 mg daily  Continue atorvastatin 40 mg daily  Coreg 3.125 mg bid  VHD:  mitral valve repair (30 mm Medtronic Future band) and tricuspid valve repair (28 mm Medtronic triad band) and SATHISH excision with 45 mm AtriClip (7/19/2024)  HTN:  Continue amlodipine 5 mg daily  Continue hydralazine 25 g daily  Coreg as above  Post op A.fib:  Continue Amiodarone drip  Consideration for anticoagulation per CT surgery     Past Medical History:   Diagnosis Date

## 2024-08-14 NOTE — PROGRESS NOTES
Cardiothoracic Surgery       CC: S/P CABG/Valve Surgery post-operative follow up visit      HPI:  Binh Erazo is a 53 y.o. male whom presents to the office today for routine post-operative follow-up status post: Sternotomy/CABG x 3 (LIMA-LAD, SVG-Diag, SVG-RCA)/Complex mitral valve repair by medial commissuroplasty and 30mm Medtronic Future band/Tricuspid valve repair with 28mm Medtronic TriAd band/SATHISH exclusion with 45mm AtriClip/EVH RLE/Rigid internal fixation of the sternum with KLS plates x 2 on 7/19/2024 .  Eliquis was started post op for Afib and his MVrp and TVrp.  He was DC'd home on POD 6  There have been no readmissions since discharge.  Currently, there are no complaints of any CP, SOB, major concerns, or incisional issues.     Review of Systems:   Constitutional: Negative for fever and chills.   Respiratory: Negative for cough, chest tightness and shortness of breath.    Cardiovascular: Negative for chest pain, palpitations and leg swelling.   Gastrointestinal: Negative for nausea, diarrhea and constipation.   Musculoskeletal: Negative for back pain.   Skin: Negative for color change.   Neurological: Negative for dizziness, syncope and light-headedness.          Objective:   Physical Exam   Constitutional: oriented to person, place, and time. No distress.   Cardiovascular: Normal rate.    Pulmonary/Chest: Effort normal. No respiratory distress.   midsternal and chest tube incisions well healed without evidence of infection. Sternum stable.   Abdominal: Soft. Bowel sounds are normal.   Musculoskeletal: Normal range of motion. Exhibits no edema.   Neurological: alert and oriented to person, place, and time.   Skin: Skin is warm and dry. No erythema.   Vein harvest incisions intact without evidence of infection   Psychiatric: normal mood and affect.       Assessment:      S/P Sternotomy/CABG x 3 (LIMA-LAD,

## 2024-08-20 ENCOUNTER — OFFICE VISIT (OUTPATIENT)
Dept: CARDIOTHORACIC SURGERY | Age: 54
End: 2024-08-20

## 2024-08-20 VITALS — WEIGHT: 160 LBS | BODY MASS INDEX: 25.06 KG/M2

## 2024-08-20 DIAGNOSIS — Z95.1 S/P CABG X 3: Primary | ICD-10-CM

## 2024-08-20 DIAGNOSIS — Z98.890 S/P MVR (MITRAL VALVE REPAIR): ICD-10-CM

## 2024-08-20 PROCEDURE — 99024 POSTOP FOLLOW-UP VISIT: CPT | Performed by: THORACIC SURGERY (CARDIOTHORACIC VASCULAR SURGERY)

## 2024-08-22 RX ORDER — FUROSEMIDE 20 MG/1
20 TABLET ORAL DAILY
Qty: 90 TABLET | OUTPATIENT
Start: 2024-08-22

## 2024-09-06 ENCOUNTER — TELEPHONE (OUTPATIENT)
Dept: CARDIOLOGY CLINIC | Age: 54
End: 2024-09-06

## 2024-09-06 RX ORDER — POTASSIUM CHLORIDE 750 MG/1
10 TABLET, EXTENDED RELEASE ORAL DAILY
Qty: 90 TABLET | OUTPATIENT
Start: 2024-09-06

## 2024-09-20 RX ORDER — FUROSEMIDE 20 MG
20 TABLET ORAL DAILY
Qty: 90 TABLET | OUTPATIENT
Start: 2024-09-20

## 2024-10-14 RX ORDER — POTASSIUM CHLORIDE 750 MG/1
10 TABLET, EXTENDED RELEASE ORAL DAILY
Qty: 30 TABLET | Refills: 1 | OUTPATIENT
Start: 2024-10-14

## 2024-10-21 RX ORDER — APIXABAN 5 MG/1
5 TABLET, FILM COATED ORAL 2 TIMES DAILY
Qty: 60 TABLET | Refills: 2 | OUTPATIENT
Start: 2024-10-21

## 2024-11-01 ENCOUNTER — OFFICE VISIT (OUTPATIENT)
Dept: CARDIOLOGY CLINIC | Age: 54
End: 2024-11-01

## 2024-11-01 VITALS
WEIGHT: 174.1 LBS | HEART RATE: 84 BPM | SYSTOLIC BLOOD PRESSURE: 142 MMHG | BODY MASS INDEX: 27.33 KG/M2 | DIASTOLIC BLOOD PRESSURE: 80 MMHG | HEIGHT: 67 IN | RESPIRATION RATE: 18 BRPM

## 2024-11-01 DIAGNOSIS — I51.7 LVH (LEFT VENTRICULAR HYPERTROPHY): ICD-10-CM

## 2024-11-01 DIAGNOSIS — I25.10 CORONARY ARTERY DISEASE INVOLVING NATIVE CORONARY ARTERY OF NATIVE HEART WITHOUT ANGINA PECTORIS: ICD-10-CM

## 2024-11-01 DIAGNOSIS — R94.31 ABNORMAL EKG: ICD-10-CM

## 2024-11-01 DIAGNOSIS — I34.0 SEVERE MITRAL REGURGITATION: ICD-10-CM

## 2024-11-01 DIAGNOSIS — Z72.0 TOBACCO ABUSE: ICD-10-CM

## 2024-11-01 DIAGNOSIS — R00.2 PALPITATIONS: ICD-10-CM

## 2024-11-01 DIAGNOSIS — Z01.810 PREOPERATIVE CARDIOVASCULAR EXAMINATION: ICD-10-CM

## 2024-11-01 DIAGNOSIS — I34.0 SEVERE MITRAL VALVE REGURGITATION: ICD-10-CM

## 2024-11-01 DIAGNOSIS — R94.39 ABNORMAL NUCLEAR STRESS TEST: ICD-10-CM

## 2024-11-01 DIAGNOSIS — I07.1 MODERATE TRICUSPID VALVE REGURGITATION: Primary | ICD-10-CM

## 2024-11-01 DIAGNOSIS — R06.09 DOE (DYSPNEA ON EXERTION): ICD-10-CM

## 2024-11-01 DIAGNOSIS — I34.0 MITRAL VALVE INSUFFICIENCY, UNSPECIFIED ETIOLOGY: ICD-10-CM

## 2024-11-01 DIAGNOSIS — I07.1 TRICUSPID VALVE INSUFFICIENCY, UNSPECIFIED ETIOLOGY: ICD-10-CM

## 2024-11-01 DIAGNOSIS — I10 ESSENTIAL HYPERTENSION: ICD-10-CM

## 2024-11-01 RX ORDER — ASPIRIN 81 MG/1
81 TABLET ORAL DAILY
Qty: 90 TABLET | Refills: 3 | Status: SHIPPED | OUTPATIENT
Start: 2024-11-01

## 2024-11-01 RX ORDER — CARVEDILOL 3.12 MG/1
3.12 TABLET ORAL 2 TIMES DAILY WITH MEALS
Qty: 180 TABLET | OUTPATIENT
Start: 2024-11-01

## 2024-11-01 RX ORDER — AMLODIPINE BESYLATE 5 MG/1
5 TABLET ORAL DAILY
Qty: 90 TABLET | Refills: 3 | Status: SHIPPED | OUTPATIENT
Start: 2024-11-01

## 2024-11-01 RX ORDER — CARVEDILOL 3.12 MG/1
3.12 TABLET ORAL 2 TIMES DAILY WITH MEALS
Qty: 90 TABLET | Refills: 3 | Status: SHIPPED | OUTPATIENT
Start: 2024-11-01

## 2024-11-01 RX ORDER — ATORVASTATIN CALCIUM 40 MG/1
40 TABLET, FILM COATED ORAL DAILY
Qty: 90 TABLET | Refills: 3 | Status: SHIPPED | OUTPATIENT
Start: 2024-11-01

## 2024-11-01 NOTE — PROGRESS NOTES
Patient was seen today for a heart monitor placement ordered by Dr. lynn    Monitor type: zio xt  Duration: 14 days  Serial number: [id number]    The monitor was applied and instructions were given.    Brigida Archibald MA    
LVH  13.  Bradycardia    Plan  He report able to go up about 10-12 steps each day without any symptoms.    He is planning shoulder surgery and since patient reports he is active and able to achieve more than 4 METS without any symptoms he may proceed to surgery without additional cardiac testing.    He was told according to patient that Eliquis was temporarily as he reports he is postop A-fib was a short-lived and was told during hospitalization that Eliquis and amiodarone were a short course.    I am arranging for Zio patch heart monitor for 14 days to see if there is any recurrent A-fib.  Continue on Coreg, Lipitor, hydralazine, and aspirin  Aspirin may be held 5 to 7 days prior to surgery and resume as soon as adequate homeostasis achieved  On Eliquis currently as he completed therapy according to patient  Also not on amiodarone currently.      Follow up Return in about 3 months (around 2/1/2025).     Thank you for allowing me to participate in your patient's care. Please feel free to contact me if you have any questions or concerns.    Palmer Mcnulty MD  [facility]

## 2024-11-20 RX ORDER — HYDRALAZINE HYDROCHLORIDE 25 MG/1
25 TABLET, FILM COATED ORAL EVERY 8 HOURS SCHEDULED
Qty: 270 TABLET | OUTPATIENT
Start: 2024-11-20

## 2024-11-22 ENCOUNTER — TELEPHONE (OUTPATIENT)
Dept: CARDIOLOGY CLINIC | Age: 54
End: 2024-11-22

## 2024-11-22 DIAGNOSIS — R00.2 PALPITATIONS: ICD-10-CM

## 2024-11-22 NOTE — TELEPHONE ENCOUNTER
----- Message from Dr. Palmer Mcnulty MD sent at 11/22/2024 10:28 AM EST -----  Please notify patient heart monitor shows some episodes of nonsustained supraventricular tachycardia.  If patient is having symptoms please arrange to have patient follow-up with me in December 11, 12, 13 between 730 and 8:30 AM

## 2024-11-25 NOTE — TELEPHONE ENCOUNTER
Pt advised of results and verbalized understanding. Pt denies any cardiac symptoms at this time. Follow up has been scheduled in April. Pt advised to call office if anything changes.

## 2024-11-26 RX ORDER — HYDRALAZINE HYDROCHLORIDE 25 MG/1
25 TABLET, FILM COATED ORAL EVERY 8 HOURS SCHEDULED
Qty: 270 TABLET | Refills: 3 | Status: SHIPPED | OUTPATIENT
Start: 2024-11-26

## 2024-12-01 PROBLEM — Z01.810 PREOPERATIVE CARDIOVASCULAR EXAMINATION: Status: RESOLVED | Noted: 2024-11-01 | Resolved: 2024-12-01

## 2025-02-04 ENCOUNTER — OFFICE VISIT (OUTPATIENT)
Dept: CARDIOLOGY CLINIC | Age: 55
End: 2025-02-04

## 2025-02-04 VITALS
DIASTOLIC BLOOD PRESSURE: 100 MMHG | OXYGEN SATURATION: 99 % | WEIGHT: 181.8 LBS | RESPIRATION RATE: 18 BRPM | HEART RATE: 67 BPM | TEMPERATURE: 97.8 F | SYSTOLIC BLOOD PRESSURE: 160 MMHG | HEIGHT: 67 IN | BODY MASS INDEX: 28.53 KG/M2

## 2025-02-04 DIAGNOSIS — I34.0 SEVERE MITRAL REGURGITATION: ICD-10-CM

## 2025-02-04 DIAGNOSIS — Z72.0 TOBACCO ABUSE: ICD-10-CM

## 2025-02-04 DIAGNOSIS — I34.0 MITRAL VALVE INSUFFICIENCY, UNSPECIFIED ETIOLOGY: ICD-10-CM

## 2025-02-04 DIAGNOSIS — I34.0 SEVERE MITRAL VALVE REGURGITATION: ICD-10-CM

## 2025-02-04 DIAGNOSIS — I51.7 LVH (LEFT VENTRICULAR HYPERTROPHY): ICD-10-CM

## 2025-02-04 DIAGNOSIS — I10 ESSENTIAL HYPERTENSION: ICD-10-CM

## 2025-02-04 DIAGNOSIS — I07.1 TRICUSPID VALVE INSUFFICIENCY, UNSPECIFIED ETIOLOGY: ICD-10-CM

## 2025-02-04 DIAGNOSIS — R94.31 ABNORMAL EKG: ICD-10-CM

## 2025-02-04 DIAGNOSIS — R94.39 ABNORMAL NUCLEAR STRESS TEST: ICD-10-CM

## 2025-02-04 DIAGNOSIS — R06.09 DOE (DYSPNEA ON EXERTION): ICD-10-CM

## 2025-02-04 DIAGNOSIS — I07.1 MODERATE TRICUSPID VALVE REGURGITATION: ICD-10-CM

## 2025-02-04 DIAGNOSIS — R00.2 PALPITATIONS: ICD-10-CM

## 2025-02-04 DIAGNOSIS — R29.818 SUSPECTED SLEEP APNEA: ICD-10-CM

## 2025-02-04 DIAGNOSIS — I25.10 CORONARY ARTERY DISEASE INVOLVING NATIVE CORONARY ARTERY OF NATIVE HEART WITHOUT ANGINA PECTORIS: Primary | ICD-10-CM

## 2025-02-04 DIAGNOSIS — I47.29 NSVT (NONSUSTAINED VENTRICULAR TACHYCARDIA) (HCC): ICD-10-CM

## 2025-02-04 RX ORDER — CARVEDILOL 12.5 MG/1
12.5 TABLET ORAL 2 TIMES DAILY WITH MEALS
Qty: 90 TABLET | Refills: 3 | Status: SHIPPED | OUTPATIENT
Start: 2025-02-04

## 2025-02-04 RX ORDER — CARVEDILOL 6.25 MG/1
6.25 TABLET ORAL 2 TIMES DAILY WITH MEALS
Qty: 90 TABLET | Refills: 3 | Status: SHIPPED
Start: 2025-02-04 | End: 2025-02-04 | Stop reason: SDUPTHER

## 2025-02-04 RX ORDER — CARVEDILOL 12.5 MG/1
12.5 TABLET ORAL 2 TIMES DAILY WITH MEALS
Qty: 180 TABLET | OUTPATIENT
Start: 2025-02-04

## 2025-02-04 RX ORDER — CARVEDILOL 6.25 MG/1
6.25 TABLET ORAL 2 TIMES DAILY WITH MEALS
Qty: 180 TABLET | OUTPATIENT
Start: 2025-02-04

## 2025-02-20 ENCOUNTER — HOSPITAL ENCOUNTER (OUTPATIENT)
Age: 55
Discharge: HOME | End: 2025-02-20
Payer: COMMERCIAL

## 2025-02-20 LAB
25(OH)D3 SERPL-MCNC: 25.4 NG/ML (ref 30–100)
ALBUMIN SERPL-MCNC: 4.5 G/DL (ref 3.5–5.2)
ALP SERPL-CCNC: 111 U/L (ref 40–129)
ALT SERPL-CCNC: 21 U/L (ref 0–40)
ANION GAP SERPL CALCULATED.3IONS-SCNC: 13 MMOL/L (ref 7–16)
AST SERPL-CCNC: 19 U/L (ref 0–39)
BILIRUB SERPL-MCNC: 0.7 MG/DL (ref 0–1.2)
BUN SERPL-MCNC: 17 MG/DL (ref 6–20)
CALCIUM SERPL-MCNC: 9.3 MG/DL (ref 8.6–10.2)
CHLORIDE SERPL-SCNC: 105 MMOL/L (ref 98–107)
CO2 SERPL-SCNC: 23 MMOL/L (ref 22–29)
CREAT SERPL-MCNC: 1.1 MG/DL (ref 0.7–1.2)
CREAT UR-MCNC: 129.7 MG/DL (ref 40–278)
CREAT UR-MCNC: 130.7 MG/DL (ref 40–278)
ERYTHROCYTE [DISTWIDTH] IN BLOOD BY AUTOMATED COUNT: 15.2 % (ref 11.5–15)
GFR, ESTIMATED: 76 ML/MIN/1.73M2
GLUCOSE SERPL-MCNC: 89 MG/DL (ref 74–99)
HCT VFR BLD AUTO: 43.1 % (ref 37–54)
HGB BLD-MCNC: 13.8 G/DL (ref 12.5–16.5)
MCH RBC QN AUTO: 27.8 PG (ref 26–35)
MCHC RBC AUTO-ENTMCNC: 32 G/DL (ref 32–34.5)
MCV RBC AUTO: 86.9 FL (ref 80–99.9)
MICROALBUMIN UR-MCNC: 14 MG/L (ref 0–19)
MICROALBUMIN/CREAT UR-RTO: 11 MCG/MG CREAT (ref 0–30)
PHOSPHATE SERPL-MCNC: 2.8 MG/DL (ref 2.5–4.5)
PLATELET # BLD AUTO: 244 K/UL (ref 130–450)
PMV BLD AUTO: 10.2 FL (ref 7–12)
POTASSIUM SERPL-SCNC: 4.5 MMOL/L (ref 3.5–5)
PROT SERPL-MCNC: 7.5 G/DL (ref 6.4–8.3)
PTH-INTACT SERPL-MCNC: 52 PG/ML (ref 15–65)
RBC # BLD AUTO: 4.96 M/UL (ref 3.8–5.8)
SODIUM SERPL-SCNC: 141 MMOL/L (ref 132–146)
TOTAL PROTEIN, URINE: 10 MG/DL (ref 0–12)
URINE TOTAL PROTEIN CREATININE RATIO: 0.08 (ref 0–0.2)
WBC OTHER # BLD: 9 K/UL (ref 4.5–11.5)

## 2025-02-20 PROCEDURE — 85027 COMPLETE CBC AUTOMATED: CPT

## 2025-02-20 PROCEDURE — 83970 ASSAY OF PARATHORMONE: CPT

## 2025-02-20 PROCEDURE — 36415 COLL VENOUS BLD VENIPUNCTURE: CPT

## 2025-02-20 PROCEDURE — 82570 ASSAY OF URINE CREATININE: CPT

## 2025-02-20 PROCEDURE — 84156 ASSAY OF PROTEIN URINE: CPT

## 2025-02-20 PROCEDURE — 82043 UR ALBUMIN QUANTITATIVE: CPT

## 2025-02-20 PROCEDURE — 80053 COMPREHEN METABOLIC PANEL: CPT

## 2025-02-20 PROCEDURE — 84100 ASSAY OF PHOSPHORUS: CPT

## 2025-02-20 PROCEDURE — 82306 VITAMIN D 25 HYDROXY: CPT

## 2025-04-07 NOTE — PROGRESS NOTES
errors should be considered accordingly.    Level of Service:  Notes Reviewed: 3+ (PCP)  Labs Reviewed: 3+ (CBC, CMP, TSH)    Miguel Davila M.D.  [facility] Sleep Medicine  Diplomate, American Board of Internal Medicine - Sleep Medicine  Diplomate, American Board of Internal Medicine  4/8/25

## 2025-04-08 ENCOUNTER — OFFICE VISIT (OUTPATIENT)
Dept: SLEEP MEDICINE | Age: 55
End: 2025-04-08
Payer: MEDICAID

## 2025-04-08 VITALS
TEMPERATURE: 98.7 F | OXYGEN SATURATION: 99 % | SYSTOLIC BLOOD PRESSURE: 166 MMHG | WEIGHT: 191.14 LBS | HEART RATE: 58 BPM | HEIGHT: 67 IN | BODY MASS INDEX: 30 KG/M2 | RESPIRATION RATE: 14 BRPM | DIASTOLIC BLOOD PRESSURE: 102 MMHG

## 2025-04-08 DIAGNOSIS — I10 PRIMARY HYPERTENSION: ICD-10-CM

## 2025-04-08 DIAGNOSIS — G47.33 OSA (OBSTRUCTIVE SLEEP APNEA): Primary | ICD-10-CM

## 2025-04-08 PROCEDURE — 99204 OFFICE O/P NEW MOD 45 MIN: CPT | Performed by: STUDENT IN AN ORGANIZED HEALTH CARE EDUCATION/TRAINING PROGRAM

## 2025-04-08 PROCEDURE — 3080F DIAST BP >= 90 MM HG: CPT | Performed by: STUDENT IN AN ORGANIZED HEALTH CARE EDUCATION/TRAINING PROGRAM

## 2025-04-08 PROCEDURE — 3077F SYST BP >= 140 MM HG: CPT | Performed by: STUDENT IN AN ORGANIZED HEALTH CARE EDUCATION/TRAINING PROGRAM

## 2025-04-08 RX ORDER — GABAPENTIN 300 MG/1
300 CAPSULE ORAL
COMMUNITY
Start: 2025-03-14

## 2025-04-08 ASSESSMENT — SLEEP AND FATIGUE QUESTIONNAIRES
HOW LIKELY ARE YOU TO NOD OFF OR FALL ASLEEP WHEN YOU ARE A PASSENGER IN A CAR FOR AN HOUR WITHOUT A BREAK: WOULD NEVER DOZE
HOW LIKELY ARE YOU TO NOD OFF OR FALL ASLEEP WHILE WATCHING TV: MODERATE CHANCE OF DOZING
HOW LIKELY ARE YOU TO NOD OFF OR FALL ASLEEP IN A CAR, WHILE STOPPED FOR A FEW MINUTES IN TRAFFIC: WOULD NEVER DOZE
HOW LIKELY ARE YOU TO NOD OFF OR FALL ASLEEP WHILE SITTING QUIETLY AFTER LUNCH WITHOUT ALCOHOL: WOULD NEVER DOZE
HOW LIKELY ARE YOU TO NOD OFF OR FALL ASLEEP WHILE LYING DOWN TO REST IN THE AFTERNOON WHEN CIRCUMSTANCES PERMIT: MODERATE CHANCE OF DOZING
HOW LIKELY ARE YOU TO NOD OFF OR FALL ASLEEP WHILE SITTING AND TALKING TO SOMEONE: WOULD NEVER DOZE
ESS TOTAL SCORE: 4
HOW LIKELY ARE YOU TO NOD OFF OR FALL ASLEEP WHILE SITTING INACTIVE IN A PUBLIC PLACE: WOULD NEVER DOZE
HOW LIKELY ARE YOU TO NOD OFF OR FALL ASLEEP WHILE SITTING AND READING: WOULD NEVER DOZE

## 2025-04-23 ENCOUNTER — HOSPITAL ENCOUNTER (OUTPATIENT)
Dept: SLEEP CENTER | Age: 55
Discharge: HOME | End: 2025-04-23
Payer: MEDICAID

## 2025-04-23 DIAGNOSIS — G47.33 OSA (OBSTRUCTIVE SLEEP APNEA): ICD-10-CM

## 2025-04-23 PROCEDURE — 95800 SLP STDY UNATTENDED: CPT

## 2025-05-06 RX ORDER — CARVEDILOL 3.12 MG/1
3.12 TABLET ORAL 2 TIMES DAILY WITH MEALS
Qty: 90 TABLET | Refills: 3 | Status: SHIPPED | OUTPATIENT
Start: 2025-05-06

## 2025-05-19 ENCOUNTER — OFFICE VISIT (OUTPATIENT)
Dept: CARDIOLOGY CLINIC | Age: 55
End: 2025-05-19
Payer: MEDICAID

## 2025-05-19 VITALS
RESPIRATION RATE: 18 BRPM | OXYGEN SATURATION: 95 % | DIASTOLIC BLOOD PRESSURE: 90 MMHG | BODY MASS INDEX: 28.2 KG/M2 | HEART RATE: 57 BPM | SYSTOLIC BLOOD PRESSURE: 172 MMHG | TEMPERATURE: 97 F | WEIGHT: 186.1 LBS | HEIGHT: 68 IN

## 2025-05-19 DIAGNOSIS — R94.39 ABNORMAL NUCLEAR STRESS TEST: ICD-10-CM

## 2025-05-19 DIAGNOSIS — I51.7 LVH (LEFT VENTRICULAR HYPERTROPHY): ICD-10-CM

## 2025-05-19 DIAGNOSIS — I34.0 MITRAL VALVE INSUFFICIENCY, UNSPECIFIED ETIOLOGY: ICD-10-CM

## 2025-05-19 DIAGNOSIS — I10 ESSENTIAL HYPERTENSION: ICD-10-CM

## 2025-05-19 DIAGNOSIS — I07.1 RHEUMATIC TRICUSPID VALVE REGURGITATION: ICD-10-CM

## 2025-05-19 DIAGNOSIS — I25.10 CORONARY ARTERY DISEASE INVOLVING NATIVE CORONARY ARTERY OF NATIVE HEART WITHOUT ANGINA PECTORIS: Primary | ICD-10-CM

## 2025-05-19 DIAGNOSIS — I34.0 SEVERE MITRAL REGURGITATION: ICD-10-CM

## 2025-05-19 DIAGNOSIS — I47.29 NSVT (NONSUSTAINED VENTRICULAR TACHYCARDIA) (HCC): ICD-10-CM

## 2025-05-19 DIAGNOSIS — I07.1 MODERATE TRICUSPID VALVE REGURGITATION: ICD-10-CM

## 2025-05-19 PROCEDURE — 93000 ELECTROCARDIOGRAM COMPLETE: CPT | Performed by: INTERNAL MEDICINE

## 2025-05-19 PROCEDURE — 99214 OFFICE O/P EST MOD 30 MIN: CPT | Performed by: INTERNAL MEDICINE

## 2025-05-19 PROCEDURE — 3080F DIAST BP >= 90 MM HG: CPT | Performed by: INTERNAL MEDICINE

## 2025-05-19 PROCEDURE — 3077F SYST BP >= 140 MM HG: CPT | Performed by: INTERNAL MEDICINE

## 2025-05-19 RX ORDER — HYDRALAZINE HYDROCHLORIDE 50 MG/1
50 TABLET, FILM COATED ORAL EVERY 8 HOURS SCHEDULED
Qty: 90 TABLET | Refills: 3 | Status: SHIPPED | OUTPATIENT
Start: 2025-05-19

## 2025-05-19 NOTE — PROGRESS NOTES
Not on file    Number of children: Not on file    Years of education: Not on file    Highest education level: Not on file   Occupational History    Not on file   Tobacco Use    Smoking status: Former     Types: Cigars     Quit date: 2024     Years since quittin.8    Smokeless tobacco: Former     Types: Chew     Quit date: 2024   Vaping Use    Vaping status: Never Used   Substance and Sexual Activity    Alcohol use: Not Currently    Drug use: Not Currently     Types: Marijuana (Weed)    Sexual activity: Not on file   Other Topics Concern    Not on file   Social History Narrative    Not on file     Social Drivers of Health     Financial Resource Strain: Not on file   Food Insecurity: Not on file   Transportation Needs: Not on file   Physical Activity: Not on file   Stress: Not on file   Social Connections: Not on file   Intimate Partner Violence: Not on file   Housing Stability: Not on file       Allergies:  Allergies   Allergen Reactions    Cat Dander Other (See Comments)     Eyes swell up    Grass Extracts [Gramineae Pollens] Other (See Comments)     Congestion and itchy eyes.       Home Medications:  Prior to Admission medications    Medication Sig Start Date End Date Taking? Authorizing Provider   hydrALAZINE (APRESOLINE) 50 MG tablet Take 1 tablet by mouth every 8 hours 25  Yes Palmer Mcnulty MD   gabapentin (NEURONTIN) 300 MG capsule Take 1 capsule by mouth nightly. 3/14/25  Yes ProviderSaida MD   carvedilol (COREG) 12.5 MG tablet Take 1 tablet by mouth 2 times daily (with meals) 25  Yes Palmer Mcnulty MD   aspirin 81 MG EC tablet Take 1 tablet by mouth daily 24  Yes Palmer Mcnulty MD   atorvastatin (LIPITOR) 40 MG tablet Take 1 tablet by mouth daily 24  Yes Palmer Mcnulty MD       Current Medications:  Current Outpatient Medications   Medication Sig Dispense Refill    hydrALAZINE (APRESOLINE) 50 MG tablet Take 1 tablet by mouth every 8 hours 90

## 2025-05-23 DIAGNOSIS — G47.33 OSA (OBSTRUCTIVE SLEEP APNEA): Primary | ICD-10-CM

## 2025-06-04 RX ORDER — ATORVASTATIN CALCIUM 40 MG/1
40 TABLET, FILM COATED ORAL DAILY
Qty: 90 TABLET | Refills: 3 | OUTPATIENT
Start: 2025-06-04

## 2025-06-10 ENCOUNTER — HOSPITAL ENCOUNTER (OUTPATIENT)
Dept: SLEEP CENTER | Age: 55
Discharge: HOME | End: 2025-06-10
Payer: MEDICAID

## 2025-06-10 DIAGNOSIS — G47.33 OSA (OBSTRUCTIVE SLEEP APNEA): ICD-10-CM

## 2025-06-10 PROCEDURE — 95811 POLYSOM 6/>YRS CPAP 4/> PARM: CPT

## 2025-06-10 RX ORDER — ATORVASTATIN CALCIUM 40 MG/1
40 TABLET, FILM COATED ORAL DAILY
Qty: 90 TABLET | Refills: 3 | Status: SHIPPED | OUTPATIENT
Start: 2025-06-10

## 2025-06-11 VITALS — HEART RATE: 57 BPM | OXYGEN SATURATION: 96 % | WEIGHT: 180 LBS | BODY MASS INDEX: 28.25 KG/M2 | HEIGHT: 67 IN

## 2025-07-09 ENCOUNTER — TELEPHONE (OUTPATIENT)
Dept: SLEEP MEDICINE | Age: 55
End: 2025-07-09

## 2025-07-09 NOTE — TELEPHONE ENCOUNTER
----- Message from Dr. Yuridia Almeida, DO sent at 5/23/2025 12:20 AM EDT -----  Can you please make an appointment to review sleep study results?

## 2025-07-09 NOTE — TELEPHONE ENCOUNTER
Call to patient to ask him to call the sleep lab to schedule his titration results and to call back the physician office to make an appt to go over results.

## 2025-07-11 ENCOUNTER — TELEPHONE (OUTPATIENT)
Dept: SLEEP MEDICINE | Age: 55
End: 2025-07-11

## 2025-07-11 DIAGNOSIS — I10 PRIMARY HYPERTENSION: ICD-10-CM

## 2025-07-11 DIAGNOSIS — G47.33 OSA (OBSTRUCTIVE SLEEP APNEA): Primary | ICD-10-CM

## 2025-07-11 NOTE — TELEPHONE ENCOUNTER
Pt ret call.  Discussed SS results with him.  Severe BETH.  Insurance requires a titration study be done prior to starting CPAP therapy.  Explained this to pt.  Gave pt number for lab to schedule the study.  Orders placed

## 2025-07-17 RX ORDER — CARVEDILOL 12.5 MG/1
12.5 TABLET ORAL 2 TIMES DAILY WITH MEALS
Qty: 90 TABLET | Refills: 3 | Status: SHIPPED | OUTPATIENT
Start: 2025-07-17

## 2025-07-25 ENCOUNTER — HOSPITAL ENCOUNTER (OUTPATIENT)
Dept: SLEEP CENTER | Age: 55
Discharge: HOME | End: 2025-07-25
Payer: MEDICAID

## 2025-07-25 DIAGNOSIS — G47.33 OSA (OBSTRUCTIVE SLEEP APNEA): ICD-10-CM

## 2025-07-25 DIAGNOSIS — I10 PRIMARY HYPERTENSION: ICD-10-CM

## 2025-07-25 PROCEDURE — 95811 POLYSOM 6/>YRS CPAP 4/> PARM: CPT

## 2025-07-27 DIAGNOSIS — G47.31 CENTRAL SLEEP APNEA: Primary | ICD-10-CM

## 2025-07-27 PROBLEM — G47.39 COMPLEX SLEEP APNEA SYNDROME: Status: ACTIVE | Noted: 2025-07-27

## 2025-07-27 PROBLEM — G47.39 TREATMENT-EMERGENT CENTRAL SLEEP APNEA: Status: ACTIVE | Noted: 2025-07-27

## 2025-07-27 PROBLEM — G47.33 OSA (OBSTRUCTIVE SLEEP APNEA): Status: ACTIVE | Noted: 2025-07-27

## 2025-07-31 ENCOUNTER — TELEPHONE (OUTPATIENT)
Dept: SLEEP MEDICINE | Age: 55
End: 2025-07-31

## 2025-08-18 RX ORDER — HYDRALAZINE HYDROCHLORIDE 50 MG/1
50 TABLET, FILM COATED ORAL EVERY 8 HOURS SCHEDULED
Qty: 90 TABLET | Refills: 3 | Status: SHIPPED | OUTPATIENT
Start: 2025-08-18

## (undated) DEVICE — PACK HEART OPEN

## (undated) DEVICE — ELECTRODE PT RET AD L9FT HI MOIST COND ADH HYDRGEL CORDED

## (undated) DEVICE — KIT DIL 8/12/16/20/24FR NDL 18GA GWIRE L180CM DIA0.035IN

## (undated) DEVICE — BASIC SINGLE BASIN 1-LF: Brand: MEDLINE INDUSTRIES, INC.

## (undated) DEVICE — KIT MFLD ISOLATN NACL CNTRST PRT TBNG SPIK W/ PRSS TRNSDUC

## (undated) DEVICE — GLOVE SURG SZ 6 THK91MIL LTX FREE SYN POLYISOPRENE ANTI

## (undated) DEVICE — CATHETER,URETHRAL,REDRUBBER,STERILE,20FR: Brand: MEDLINE

## (undated) DEVICE — COR-KNOT MINI® COMBO KITBASE PACKAGE TYPE - KITEACH STERILE PACKAGE KIT CONTAINS (2) SINGLE PATIENT USE COR-KNOT MINI® DEVICES AND (12) COR-KNOT® QUICK LOADS®.: Brand: COR-KNOT MINI®

## (undated) DEVICE — CATHETER 6FR JR4 MEDTRONIC 100CM

## (undated) DEVICE — GLIDESHEATH NITINOL HYDROPHILIC COATED INTRODUCER SHEATH: Brand: GLIDESHEATH

## (undated) DEVICE — GOWN,SIRUS,FABRNF,XL,20/CS: Brand: MEDLINE

## (undated) DEVICE — STRAP POS MP 30X3 IN HK LOOP CLOSURE FOAM DISP

## (undated) DEVICE — Device: Brand: VIRTUOSAPH PLUS WITH RADIAL INDICATION

## (undated) DEVICE — PACK OPEN HRT DRP

## (undated) DEVICE — SOLUTION IV 1000ML 140MEQ/L SOD 5MEQ/L K 3MEQ/L MG 27MEQ/L

## (undated) DEVICE — GLOVE SURG SZ 7.5 L11.73IN FNGR THK9.8MIL STRW LTX POLYMER

## (undated) DEVICE — BLOWER COR ART L16.5CM PLAS SHFT MAL W/ MIST IV SET AXIUS

## (undated) DEVICE — CATHETER THOR 32FR L23IN PVC 6 EYELET STR ATRAUM

## (undated) DEVICE — PACK SURG CARDIAC CATH

## (undated) DEVICE — CATHETER 6FR JL3.5 MEDTRONIC 100CM

## (undated) DEVICE — SOLUTION IV 1000ML 0.9% SOD CHL PH 5 INJ USP VIAFLX PLAS

## (undated) DEVICE — SOLUTION IRRIG 3000ML 0.9% SOD CHL USP UROMATIC PLAS CONT

## (undated) DEVICE — CATHETER DIAG 6FR L110CM PIGTAILS CRV STYL PIG145 DXTERITY

## (undated) DEVICE — TOWEL,OR,DSP,ST,BLUE,DLX,4/PK,20PK/CS: Brand: MEDLINE

## (undated) DEVICE — PAD, DEFIB, ADULT, RADIOTRAN, PHYSIO, LO: Brand: MEDLINE

## (undated) DEVICE — TOWEL,OR,DSP,ST,BLUE,STD,6/PK,12PK/CS: Brand: MEDLINE

## (undated) DEVICE — STERILE LATEX POWDER FREE SURGICAL GLOVES WITH HYDROGEL COATING: Brand: PROTEXIS

## (undated) DEVICE — SS SUTURE, 3 PER SLEEVE: Brand: MYO/WIRE II

## (undated) DEVICE — PICO 7 10CM X 30CM: Brand: PICO™ 7

## (undated) DEVICE — 1LYRTR 16FR10ML100%SILTMPS SNP: Brand: MEDLINE INDUSTRIES, INC.

## (undated) DEVICE — AGENT HEMOSTATIC SURG ORIGINAL ABS 4X8IN LOOSE KNIT 12/CA

## (undated) DEVICE — GLOVE ORANGE PI 7 1/2   MSG9075

## (undated) DEVICE — MARKER A/C LOCATOR GRAFT U SILICONE

## (undated) DEVICE — LIQUIBAND RAPID ADHESIVE 36/CS 0.8ML: Brand: MEDLINE

## (undated) DEVICE — CATHETER THOR 32FR L23IN PVC 5 EYELET STR ATRAUM

## (undated) DEVICE — PACK BTTRY FOR B08 SCRDRVR DRL

## (undated) DEVICE — TUBING, SUCTION, 3/16" X 12', STRAIGHT: Brand: MEDLINE

## (undated) DEVICE — INSUFFLATION TUBING SET WITH FILTER, FUNNEL CONNECTOR AND LUER LOCK: Brand: JOSNOE MEDICAL INC

## (undated) DEVICE — COR-KNOT® QUICK LOAD® 6-POUCH: Brand: COR-KNOT® QUICK LOAD®

## (undated) DEVICE — CATHETER,URETHRAL,REDRUBBER,STRL,18FR: Brand: MEDLINE

## (undated) DEVICE — PUMP SUC IRR TBNG L10FT W/ HNDPC ASSEMB STRYKEFLOW 2

## (undated) DEVICE — Device: Brand: RAP FEMORAL VENOUS CANNULA

## (undated) DEVICE — CHANNEL DRAIN, 28FR, HUBLESS: Brand: JACKSON-PRATT

## (undated) DEVICE — CONNECTOR DRNGE W3/8-0.5XH3/16XL3/16IN 2:1 SIL CARD STR

## (undated) DEVICE — AORTIC PUNCHES ARE USED TO CREATE A UNIFORM OPENING IN BLOOD VESSELS DURING CARDIOVASCULAR SURGERY. THE VESSEL GRAFT IS INSERTED INTO THE CREATED OPENING AND SUTURED TO THE VESSEL WALL. AORTIC LANCETS ARE USED TO MAKE A SMALL UNIFORM CUT IN A BLOOD VESSEL TO FACILITATE INSERTION OF AN AORTIC PUNCH.  PUNCHES COME IN VARIOUS LENGTHS, DIAMETERS AND TIP CONFIGURATIONS.: Brand: CLEANCUT ROTATING AORTIC PUNCH

## (undated) DEVICE — GOWN,SIRUS,FABRNF,L,20/CS: Brand: MEDLINE

## (undated) DEVICE — VASC-BAND REG

## (undated) DEVICE — KIT ANGIO W/ AT P65 PREM HND CTRL FOR CNTRST DEL ANGIOTOUCH

## (undated) DEVICE — DOUBLE BASIN SET: Brand: MEDLINE INDUSTRIES, INC.

## (undated) DEVICE — 3M™ MEDIPORE™ + PAD 3564: Brand: 3M™ MEDIPORE™

## (undated) DEVICE — DRAIN SURG SGL COLL PT TB FOR ATS BG OASIS

## (undated) DEVICE — CANNULA INJ L2.5IN BLNT TIP 3MM CLR BODY W/ 1 W VLV DLP

## (undated) DEVICE — GUIDEWIRE VASC L260CM 0.035IN J TIP L3MM PTFE FIX COR NAMIC

## (undated) DEVICE — ALCOHOL RUBBING ISO 16OZ 70%

## (undated) DEVICE — SOLUTION IV 50ML 0.9% SOD CHL PLAS CONT USP VIAFLX

## (undated) DEVICE — 6 FOOT DISPOSABLE EXTENSION CABLE WITH SAFE CONNECT / SCREW-DOWN

## (undated) DEVICE — SUMP INTCARD SUCT AD 20FR PERICARD MAYO STYL FLX VERSATILE

## (undated) DEVICE — ELECTRODE ES AD PED L2.5IN TEF INSUL MOD NONCORDED BLDE TIP

## (undated) DEVICE — GLOVE ORANGE PI 7   MSG9070

## (undated) DEVICE — CANNULA NSL CANN NSL L25FT TBNG AD O2 SUP SFT UC

## (undated) DEVICE — TOWEL,OR,DSP,ST,WHITE,DLX,4/PK,20PK/CS: Brand: MEDLINE

## (undated) DEVICE — GLOVE SURG SZ 65 THK91MIL LTX FREE SYN POLYISOPRENE

## (undated) DEVICE — DRAPE THER FLUID WARMING 66X44 IN FLAT SLUSH DBL DISC ORS